# Patient Record
Sex: FEMALE | Race: WHITE | Employment: OTHER | URBAN - METROPOLITAN AREA
[De-identification: names, ages, dates, MRNs, and addresses within clinical notes are randomized per-mention and may not be internally consistent; named-entity substitution may affect disease eponyms.]

---

## 2017-01-16 DIAGNOSIS — I10 ESSENTIAL HYPERTENSION, BENIGN: Primary | ICD-10-CM

## 2017-01-16 DIAGNOSIS — F51.01 PRIMARY INSOMNIA: ICD-10-CM

## 2017-01-16 NOTE — TELEPHONE ENCOUNTER
losartan (COZAAR) 50 MG tablet      Last Written Prescription Date: 12/16/15  Last Fill Quantity: 90, # refills: 3  Last Office Visit with [facility], [facility] or  Health prescribing provider: 4/13/16       POTASSIUM   Date Value Ref Range Status   04/13/2016 4.5 3.4 - 5.3 mmol/L Final     CREATININE   Date Value Ref Range Status   04/13/2016 1.05* 0.52 - 1.04 mg/dL Final     BP Readings from Last 3 Encounters:   07/11/16 108/40   04/13/16 109/61   12/17/15 114/56     traZODone (DESYREL) 50 MG tablet       Last Written Prescription Date: 12/16/15  Last Fill Quantity: 90; # refills: 3  Last Office Visit with [facility], [facility] or [facility] prescribing provider:  4/13/16        Last PHQ-9 score on record=   PHQ-9 SCORE 12/16/2015   Total Score -   Total Score 0       AST       13   12/15/2015  ALT       28   12/15/2015

## 2017-01-18 RX ORDER — LOSARTAN POTASSIUM 50 MG/1
50 TABLET ORAL DAILY
Qty: 90 TABLET | Refills: 0 | Status: SHIPPED | OUTPATIENT
Start: 2017-01-18 | End: 2017-04-19

## 2017-01-18 RX ORDER — TRAZODONE HYDROCHLORIDE 50 MG/1
50 TABLET, FILM COATED ORAL
Qty: 90 TABLET | Refills: 0 | Status: SHIPPED | OUTPATIENT
Start: 2017-01-18 | End: 2017-04-19

## 2017-01-18 NOTE — TELEPHONE ENCOUNTER
Routing refill request to provider for review/approval because:  Labs out of range:  Cr    Med pended.     Rain Ford RN

## 2017-03-08 DIAGNOSIS — I10 HTN (HYPERTENSION), BENIGN: ICD-10-CM

## 2017-03-10 RX ORDER — LABETALOL 200 MG/1
TABLET, FILM COATED ORAL
Qty: 90 TABLET | Refills: 0 | Status: SHIPPED | OUTPATIENT
Start: 2017-03-10 | End: 2017-04-16

## 2017-03-15 DIAGNOSIS — I10 HTN (HYPERTENSION), BENIGN: ICD-10-CM

## 2017-03-16 RX ORDER — LABETALOL 200 MG/1
TABLET, FILM COATED ORAL
Start: 2017-03-16

## 2017-04-16 DIAGNOSIS — I10 HTN (HYPERTENSION), BENIGN: ICD-10-CM

## 2017-04-18 RX ORDER — LABETALOL 200 MG/1
TABLET, FILM COATED ORAL
Qty: 90 TABLET | Refills: 0 | Status: SHIPPED | OUTPATIENT
Start: 2017-04-18 | End: 2017-04-19

## 2017-04-18 NOTE — TELEPHONE ENCOUNTER
labetalol (NORMODYNE) 200 MG tablet      Last Written Prescription Date: 3/10/2017  Last Fill Quantity: 90, # refills: 0    Last Office Visit with [facility], [facility] or [facility] prescribing provider:  4/13/2016   Future Office Visit:    Next 5 appointments (look out 90 days)     Apr 19, 2017 12:00 PM CDT   [facility] Susan Pena [facility]-C   [facility]    [address] [facility] [address]   [phone]                    BP Readings from Last 3 Encounters:   07/11/16 108/40   04/13/16 109/61   12/17/15 114/56

## 2017-04-18 NOTE — TELEPHONE ENCOUNTER
Medication is being filled for 1 time refill only due to:  overdue for OV, scheduled.   Melissa Romero RN

## 2017-04-19 ENCOUNTER — HOSPITAL ENCOUNTER (OUTPATIENT)
Dept: MAMMOGRAPHY | Facility: CLINIC | Age: 77
Discharge: HOME | End: 2017-04-19
Attending: PHYSICIAN ASSISTANT | Admitting: PHYSICIAN ASSISTANT
Payer: MEDICARE

## 2017-04-19 ENCOUNTER — HOSPITAL ENCOUNTER (OUTPATIENT)
Dept: BONE DENSITY | Facility: CLINIC | Age: 77
End: 2017-04-19
Attending: PHYSICIAN ASSISTANT
Payer: MEDICARE

## 2017-04-19 ENCOUNTER — OFFICE VISIT (OUTPATIENT)
Dept: FAMILY MEDICINE | Facility: CLINIC | Age: 77
End: 2017-04-19
Payer: COMMERCIAL

## 2017-04-19 VITALS
SYSTOLIC BLOOD PRESSURE: 114 MMHG | DIASTOLIC BLOOD PRESSURE: 53 MMHG | OXYGEN SATURATION: 94 % | BODY MASS INDEX: 24.35 KG/M2 | HEART RATE: 58 BPM | HEIGHT: 61 IN | TEMPERATURE: 97.5 F | WEIGHT: 129 LBS

## 2017-04-19 DIAGNOSIS — E78.5 HYPERLIPIDEMIA LDL GOAL <100: ICD-10-CM

## 2017-04-19 DIAGNOSIS — I10 ESSENTIAL HYPERTENSION, BENIGN: ICD-10-CM

## 2017-04-19 DIAGNOSIS — F51.01 PRIMARY INSOMNIA: ICD-10-CM

## 2017-04-19 DIAGNOSIS — Z78.0 POSTMENOPAUSAL STATUS: ICD-10-CM

## 2017-04-19 DIAGNOSIS — Z12.31 VISIT FOR SCREENING MAMMOGRAM: ICD-10-CM

## 2017-04-19 DIAGNOSIS — D17.30 LIPOMA OF SKIN AND SUBCUTANEOUS TISSUE: ICD-10-CM

## 2017-04-19 DIAGNOSIS — Z00.00 ENCOUNTER FOR ROUTINE ADULT HEALTH EXAMINATION WITHOUT ABNORMAL FINDINGS: Primary | ICD-10-CM

## 2017-04-19 PROCEDURE — G0202 SCR MAMMO BI INCL CAD: HCPCS

## 2017-04-19 PROCEDURE — 77080 DXA BONE DENSITY AXIAL: CPT

## 2017-04-19 PROCEDURE — G0439 PPPS, SUBSEQ VISIT: HCPCS | Performed by: PHYSICIAN ASSISTANT

## 2017-04-19 RX ORDER — TRAZODONE HYDROCHLORIDE 50 MG/1
50 TABLET, FILM COATED ORAL
Qty: 90 TABLET | Refills: 3 | Status: SHIPPED | OUTPATIENT
Start: 2017-04-19 | End: 2018-05-09

## 2017-04-19 RX ORDER — ROSUVASTATIN CALCIUM 5 MG/1
5 TABLET, COATED ORAL DAILY
Qty: 30 TABLET | Refills: 1 | Status: SHIPPED | OUTPATIENT
Start: 2017-04-19 | End: 2017-08-24

## 2017-04-19 RX ORDER — LOSARTAN POTASSIUM 50 MG/1
50 TABLET ORAL DAILY
Qty: 90 TABLET | Refills: 3 | Status: SHIPPED | OUTPATIENT
Start: 2017-04-19 | End: 2018-05-04

## 2017-04-19 RX ORDER — LABETALOL 200 MG/1
TABLET, FILM COATED ORAL
Qty: 90 TABLET | Refills: 3 | Status: SHIPPED | OUTPATIENT
Start: 2017-04-19 | End: 2018-04-30

## 2017-04-19 RX ORDER — AMLODIPINE BESYLATE 5 MG/1
5 TABLET ORAL DAILY
Qty: 90 TABLET | Refills: 3 | Status: SHIPPED | OUTPATIENT
Start: 2017-04-19 | End: 2018-04-30

## 2017-04-19 RX ORDER — ZOLPIDEM TARTRATE 5 MG/1
2.5 TABLET ORAL
Qty: 30 TABLET | Refills: 5 | Status: SHIPPED | OUTPATIENT
Start: 2017-04-19 | End: 2017-07-27

## 2017-04-19 NOTE — NURSING NOTE
"Chief Complaint   Patient presents with     Wellness Visit     not fasting       Initial /53 (BP Location: Left arm, Cuff Size: Adult Regular)  Pulse 58  Temp 97.5  F (36.4  C) (Oral)  Ht 5' 0.98\" (1.549 m)  Wt 129 lb (58.5 kg)  SpO2 94%  BMI 24.39 kg/m2 Estimated body mass index is 24.39 kg/(m^2) as calculated from the following:    Height as of this encounter: 5' 0.98\" (1.549 m).    Weight as of this encounter: 129 lb (58.5 kg).  Medication Reconciliation: complete.    Nika Morales CMA      "

## 2017-04-19 NOTE — PROGRESS NOTES
SUBJECTIVE:                                                            Karina Lopez is a 76 year old female who presents for Preventive Visit.  She is due to see Dr. Jean-Baptiste in July  The lipoma on her neck seems larger to her and more friends are commenting on this.  She did see gen surg for this many years ago but wants another opinion now since this getting bigger.  She has gained a few pounds so doesn't like her bigger stomach.  Are you in the first 12 months of your Medicare Part B coverage?  No    Healthy Habits:    Do you get at least three servings of calcium containing foods daily (dairy, green leafy vegetables, etc.)? yes    Amount of exercise or daily activities, outside of work: 3 day(s) per week    Problems taking medications regularly No    Medication side effects: Yes Simvastatin    Have you had an eye exam in the past two years? yes    Do you see a dentist twice per year? yes    Do you have sleep apnea, excessive snoring or daytime drowsiness?no    COGNITIVE SCREEN  1) Repeat 3 items (Banana, Sunrise, Chair)    2) Clock draw: NORMAL  3) 3 item recall: Recalls 3 objects  Results: 3 items recalled: COGNITIVE IMPAIRMENT LESS LIKELY    Mini-CogTM Copyright S Patrice. Licensed by the author for use in [facility]; reprinted with permission (sotalya@[facility]). All rights reserved.          Reviewed and updated as needed this visit by clinical staff  Tobacco  Allergies  Meds         Reviewed and updated as needed this visit by Provider  Allergies        Social History   Substance Use Topics     Smoking status: Former Smoker     Start date: 1/1/1974     Smokeless tobacco: Never Used      Comment: light smoker until 1974     Alcohol use 1.8 oz/week     3 Glasses of wine per week      Comment: 3 to 5 drinks per week       The patient does not drink >3 drinks per day nor >7 drinks per week.    Today's PHQ-2 Score:   PHQ-2 ( 1999 [employer]) 4/19/2017 12/16/2015   Q1: Little interest or pleasure in  doing things 0 0   Q2: Feeling down, depressed or hopeless 0 0   PHQ-2 Score 0 0       Do you feel safe in your environment - Yes    Do you have a Health Care Directive?: Yes: Patient states has Advance Directive and will bring in a copy to clinic.    Current providers sharing in care for this patient include:   Patient Care Team:  Susan Pena PA-C as PCP - General (Internal Medicine)  Ricky Puri MD as MD (Cardiology)      Hearing impairment: No    Ability to successfully perform activities of daily living: Yes, no assistance needed     Fall risk:  Fallen 2 or more times in the past year?: No  Any fall with injury in the past year?: No    Home safety:  lack of grab bars in the bathroom      The following health maintenance items are reviewed in Epic and correct as of today:  Health Maintenance   Topic Date Due     PHQ-9 Q6 MONTHS (NO INBASKET)  06/16/2016     HEMOGLOBIN Q1 YR (NO INBASKET)  12/15/2016     MICROALBUMIN Q1 YEAR( NO INBASKET)  12/15/2016     FALL RISK ASSESSMENT  12/16/2016     DEPRESSION ACTION PLAN Q1 YR (NO INBASKET)  12/16/2016     BMP Q1 YR (NO INBASKET)  04/13/2017     LIPID MONITORING Q1 YEAR( NO INBASKET)  04/13/2017     ADVANCE DIRECTIVE PLANNING Q5 YRS (NO INBASKET)  08/14/2017     INFLUENZA VACCINE (SYSTEM ASSIGNED)  09/01/2017     MAMMO SCREEN Q2 YR (SYSTEM ASSIGNED)  04/13/2018     TETANUS IMMUNIZATION (SYSTEM ASSIGNED)  10/20/2018     COLON CANCER SCREEN (SYSTEM ASSIGNED)  02/20/2024     DEXA SCAN SCREENING (SYSTEM ASSIGNED)  Completed     PNEUMOCOCCAL  Completed     Past Medical History:   Diagnosis Date     Anemia      Best vitelliform macular dystrophy      HTN (hypertension), benign      Hx of repair of dissecting thoracic aortic aneurysm, Vasile type A     +fibromuscular dysplasia     Hyperlipidaemia      Hyperlipidemia LDL goal < 130      Insomnia      Osteoporosis, postmenopausal      Past Surgical History:   Procedure Laterality Date     CARDIAC SURGERY        HC OPEN TX METATARSAL FRACTURE       S/p thoracic aortic aneurysm repair  10/21/12     VASCULAR SURGERY       Current Outpatient Prescriptions   Medication Sig Dispense Refill     zolpidem (AMBIEN) 5 MG tablet Take 0.5 tablets (2.5 mg) by mouth nightly as needed for sleep 30 tablet 5     losartan (COZAAR) 50 MG tablet Take 1 tablet (50 mg) by mouth daily 90 tablet 3     traZODone (DESYREL) 50 MG tablet Take 1 tablet (50 mg) by mouth nightly as needed for sleep 90 tablet 3     rosuvastatin (CRESTOR) 5 MG tablet Take 1 tablet (5 mg) by mouth daily 30 tablet 1     labetalol (NORMODYNE) 200 MG tablet TAKE 1 TABLET BY MOUTH 3 TIMES DAILY. PLEASE MAKE APPOINTMENT 90 tablet 0     amLODIPine (NORVASC) 5 MG tablet TAKE ONE TABLET BY MOUTH EVERY DAY 90 tablet 1     simvastatin (ZOCOR) 10 MG tablet Take 5 mg by mouth every other day        Acetaminophen (TYLENOL PO) Take 500 mg by mouth every 4 hours as needed       Ascorbic Acid (VITAMIN C PO) Take 500 mg by mouth daily       calcium carbonate (OS-ALEXI 500 MG [place]. [place]) 500 MG tablet Take 500 mg by mouth daily       fish oil-omega-3 fatty acids (FISH OIL) 1000 MG capsule Take 1 capsule by mouth daily.       lactobacillus rhamnosus, GG, (CULTURELLE) 10 B CELL capsule Take 1 capsule by mouth daily.       cholecalciferol (VITAMIN D3) 1000 UNITS capsule Take 1 capsule by mouth daily.       polyethylene glycol (MIRALAX/GLYCOLAX) powder Take 1 capful by mouth as needed.       [DISCONTINUED] losartan (COZAAR) 50 MG tablet Take 1 tablet (50 mg) by mouth daily 90 tablet 0     [DISCONTINUED] traZODone (DESYREL) 50 MG tablet Take 1 tablet (50 mg) by mouth nightly as needed for sleep 90 tablet 0     scopolamine (TRANSDERM) (1.5mg base/3day) patch Place 1 patch onto the skin every 72 hours.  Apply to hairless area behind one ear at least 4 hours before travel.  Remove old patch and change every 3 days. (Patient not taking: Reported on 4/19/2017) 2 patch 1     [DISCONTINUED] labetalol  "(NORMODYNE) 200 MG tablet Take 1 tablet (200 mg) by mouth 3 times daily 135 tablet 3     MAGNESIUM OXIDE PO Take 200 mg by mouth daily Reported on 4/19/2017         ROS:  Constitutional, HEENT, cardiovascular, pulmonary, GI, , musculoskeletal, neuro, skin, endocrine and psych systems are negative, except as otherwise noted.      OBJECTIVE:                                                            /53 (BP Location: Left arm, Cuff Size: Adult Regular)  Pulse 58  Temp 97.5  F (36.4  C) (Oral)  Ht 5' 0.98\" (1.549 m)  Wt 129 lb (58.5 kg)  SpO2 94%  BMI 24.39 kg/m2 Estimated body mass index is 24.39 kg/(m^2) as calculated from the following:    Height as of this encounter: 5' 0.98\" (1.549 m).    Weight as of this encounter: 129 lb (58.5 kg).  EXAM:   GENERAL APPEARANCE: healthy, alert and no distress  EYES: Eyes grossly normal to inspection, PERRL and conjunctivae and sclerae normal  HENT: ear canals and TM's normal, nose and mouth without ulcers or lesions, oropharynx clear and oral mucous membranes moist  NECK: At her sternal notch there is a soft, rubbery mass which pt has had for years, but is increased in side.  no adenopathy, no asymmetry, masses, or scars and thyroid normal to palpation  RESP: lungs clear to auscultation - no rales, rhonchi or wheezes  BREAST: normal without masses, tenderness or nipple discharge and no palpable axillary masses or adenopathy  CV: regular rate and rhythm, normal S1 S2, no S3 or S4, +murmur, click or rub, no peripheral edema and peripheral pulses strong  ABDOMEN: soft, nontender, no hepatosplenomegaly, no masses and bowel sounds normal  MS: no musculoskeletal defects are noted and gait is age appropriate without ataxia  SKIN: no suspicious lesions or rashes  NEURO: Normal strength and tone, sensory exam grossly normal, mentation intact and speech normal  PSYCH: mentation appears normal and affect normal/bright    ASSESSMENT / PLAN:                                       " "                     Assessment and Plan:     (Z00.00) Encounter for routine adult health examination without abnormal findings  (primary encounter diagnosis)  Comment: pt will return fasting for labs. [facility] and possibly dexa. Colonoscopy is up to date.  Plan:  CBC with         platelets            (F51.01) Primary insomnia  Comment: she is not taking either of these regularly for sleep. Ambien primarily for travel.  Plan: zolpidem (AMBIEN) 5 MG tablet, traZODone         (DESYREL) 50 MG tablet            (I10) Essential hypertension, benign  Comment: well controlled, cont same.  Plan: Albumin Random Urine Quantitative, losartan         (COZAAR) 50 MG tablet, Comprehensive metabolic         panel            (E78.5) Hyperlipidemia LDL goal <100  Comment: DC simvastatin. She is only tolerating a very low dose; higher doses cause myalgias.  Myalgias may be less with rcrestor.  Plan: Lipid panel reflex to direct LDL, rosuvastatin         (CRESTOR) 5 MG tablet            (D17.30) Lipoma of skin and subcutaneous tissue  Comment: neck lipoma is larger this year.  Plan: GENERAL SURG ADULT REFERRAL            (Z78.0) Postmenopausal status  Comment:   Plan: DX Hip/Pelvis/Spine                End of Life Planning:  Patient currently has an advanced directive: Yes.  Practitioner is supportive of decision.    COUNSELING:  Reviewed preventive health counseling, as reflected in patient instructions        Estimated body mass index is 24.39 kg/(m^2) as calculated from the following:    Height as of this encounter: 5' 0.98\" (1.549 m).    Weight as of this encounter: 129 lb (58.5 kg).     reports that she has quit smoking. She started smoking about 43 years ago. She has never used smokeless tobacco.      Appropriate preventive services were discussed with this patient, including applicable screening as appropriate for cardiovascular disease, diabetes, osteopenia/osteoporosis, and " glaucoma.  As appropriate for age/gender, discussed screening for colorectal cancer, prostate cancer, breast cancer, and cervical cancer. Checklist reviewing preventive services available has been given to the patient.    Reviewed patients plan of care and provided an AVS. The Basic Care Plan (routine screening as documented in Health Maintenance) for Karina meets the Care Plan requirement. This Care Plan has been established and reviewed with the Patient.    Counseling Resources:  ATP IV Guidelines  Pooled Cohorts Equation Calculator  Breast Cancer Risk Calculator  FRAX Risk Assessment  ICSI Preventive Guidelines  Dietary Guidelines for Americans, 2010  USDA's MyPlate  ASA Prophylaxis  Lung CA Screening    Susan Pena PA-C  [facility]

## 2017-04-19 NOTE — PATIENT INSTRUCTIONS

## 2017-04-19 NOTE — MR AVS SNAPSHOT
After Visit Summary   4/19/2017    Karina Lopez    MRN: [id number]           Patient Information     Date Of Birth          1940        Visit Information        Provider Department      4/19/2017 12:00 PM Susan Pena PA-C [facility]        Today's Diagnoses     Encounter for routine adult health examination without abnormal findings    -  1    Primary insomnia        Essential hypertension, benign        Hyperlipidemia LDL goal <100        Lipoma of skin and subcutaneous tissue        Postmenopausal status        HTN (hypertension), benign          Care Instructions      Preventive Health Recommendations    Female Ages 65 +    Yearly exam:     See your health care provider every year in order to  o Review health changes.   o Discuss preventive care.    o Review your medicines if your doctor has prescribed any.      You no longer need a yearly Pap test unless you've had an abnormal Pap test in the past 10 years. If you have vaginal symptoms, such as bleeding or discharge, be sure to talk with your provider about a Pap test.      Every 1 to 2 years, have a mammogram.  If you are over 69, talk with your health care provider about whether or not you want to continue having screening mammograms.      Every 10 years, have a colonoscopy. Or, have a yearly FIT test (stool test). These exams will check for colon cancer.       Have a cholesterol test every 5 years, or more often if your doctor advises it.       Have a diabetes test (fasting glucose) every three years. If you are at risk for diabetes, you should have this test more often.       At age 65, have a bone density scan (DEXA) to check for osteoporosis (brittle bone disease).    Shots:    Get a flu shot each year.    Get a tetanus shot every 10 years.    Talk to your doctor about your pneumonia vaccines. There are now two you should receive - Pneumovax (PPSV 23) and Prevnar (PCV 13).    Talk to your doctor about the shingles  vaccine.    Talk to your doctor about the hepatitis B vaccine.    Nutrition:     Eat at least 5 servings of fruits and vegetables each day.      Eat whole-grain bread, whole-wheat pasta and brown rice instead of white grains and rice.      Talk to your provider about Calcium and Vitamin D.     Lifestyle    Exercise at least 150 minutes a week (30 minutes a day, 5 days a week). This will help you control your weight and prevent disease.      Limit alcohol to one drink per day.      No smoking.       Wear sunscreen to prevent skin cancer.       See your dentist twice a year for an exam and cleaning.      See your eye doctor every 1 to 2 years to screen for conditions such as glaucoma, macular degeneration and cataracts.    Mammogram [address]  [phone]          Follow-ups after your visit        Additional Services     GENERAL SURG ADULT REFERRAL       Your provider has referred you to: [facility]: [facility] [place] [phone]   [email or web address]    Please be aware that coverage of these services is subject to the terms and limitations of your health insurance plan.  Call member services at your health plan with any benefit or coverage questions.      Please bring the following with you to your appointment:    (1) Any X-Rays, CTs or MRIs which have been performed.  Contact the facility where they were done to arrange for  prior to your scheduled appointment.   (2) List of current medications   (3) This referral request   (4) Any documents/labs given to you for this referral                  Future tests that were ordered for you today     Open Future Orders        Priority Expected Expires Ordered    DX Hip/Pelvis/Spine Routine  4/19/2018 4/19/2017    Lipid panel reflex to direct LDL Routine 4/20/2017 4/19/2018 4/19/2017    Albumin Random Urine Quantitative Routine 4/20/2017 4/19/2018 4/19/2017    Comprehensive metabolic panel Routine 4/20/2017 4/19/2018  "4/19/2017    CBC with platelets Routine 4/20/2017 4/19/2018 4/19/2017            Who to contact     If you have questions or need follow up information about today's clinic visit or your schedule please contact [facility] directly at [phone].  Normal or non-critical lab and imaging results will be communicated to you by MyChart, letter or phone within 4 business days after the clinic has received the results. If you do not hear from us within 7 days, please contact the clinic through MyChart or phone. If you have a critical or abnormal lab result, we will notify you by phone as soon as possible.  Submit refill requests through [employer] or call your pharmacy and they will forward the refill request to us. Please allow 3 business days for your refill to be completed.          Additional Information About Your Visit        Care EveryWhere ID     This is your Care EveryWhere ID. This could be used by other organizations to access your [place] medical records  [id number]        Your Vitals Were     Pulse Temperature Height Pulse Oximetry BMI (Body Mass Index)       58 97.5  F (36.4  C) (Oral) 5' 0.98\" (1.549 m) 94% 24.39 kg/m2        Blood Pressure from Last 3 Encounters:   04/19/17 114/53   07/11/16 108/40   04/13/16 109/61    Weight from Last 3 Encounters:   04/19/17 129 lb (58.5 kg)   07/11/16 125 lb (56.7 kg)   04/13/16 127 lb (57.6 kg)              We Performed the Following     DEPRESSION ACTION PLAN (DAP)     GENERAL SURG ADULT REFERRAL          Today's Medication Changes          These changes are accurate as of: 4/19/17 12:30 PM.  If you have any questions, ask your nurse or doctor.               Start taking these medicines.        Dose/Directions    rosuvastatin 5 MG tablet   Commonly known as:  CRESTOR   Used for:  Hyperlipidemia LDL goal <100, Encounter for routine adult health examination without abnormal findings   Started by:  Susan Pena PA-C        Dose:  5 mg   Take 1 tablet (5 " mg) by mouth daily   Quantity:  30 tablet   Refills:  1         These medicines have changed or have updated prescriptions.        Dose/Directions    amLODIPine 5 MG tablet   Commonly known as:  NORVASC   This may have changed:  See the new instructions.   Used for:  HTN (hypertension), benign   Changed by:  Susan Pena PA-C        Dose:  5 mg   Take 1 tablet (5 mg) by mouth daily   Quantity:  90 tablet   Refills:  3       labetalol 200 MG tablet   Commonly known as:  NORMODYNE   This may have changed:  See the new instructions.   Used for:  HTN (hypertension), benign   Changed by:  Susan Pena PA-C        TAKE 1 TABLET BY MOUTH 3 TIMES DAILY.   Quantity:  90 tablet   Refills:  3         Stop taking these medicines if you haven't already. Please contact your care team if you have questions.     simvastatin 10 MG tablet   Commonly known as:  ZOCOR   Stopped by:  Susan Pena PA-C                Where to get your medicines      These medications were sent to [facility] [address], [place] [address]  [address], [place] [address], [facility] [address]     Phone:  [phone]     amLODIPine 5 MG tablet    labetalol 200 MG tablet    losartan 50 MG tablet    rosuvastatin 5 MG tablet    traZODone 50 MG tablet         Some of these will need a paper prescription and others can be bought over the counter.  Ask your nurse if you have questions.     Bring a paper prescription for each of these medications     zolpidem 5 MG tablet                Primary Care Provider Office Phone # Fax #    Susan Pena PA-C [phone]       [place] [address]  [facility] [address]        Thank you!     Thank you for choosing [facility]  for your care. Our goal is always to provide you with excellent care. Hearing back from our patients is one way we can continue to improve our services. Please take a few minutes to complete the written  survey that you may receive in the mail after your visit with us. Thank you!             Your Updated Medication List - Protect others around you: Learn how to safely use, store and throw away your medicines at www.disposemymeds.org.          This list is accurate as of: 4/19/17 12:30 PM.  Always use your most recent med list.                   Brand Name Dispense Instructions for use    amLODIPine 5 MG tablet    NORVASC    90 tablet    Take 1 tablet (5 mg) by mouth daily       calcium carbonate 500 MG tablet    OS-ALEXI 500 mg [place]. [place]     Take 500 mg by mouth daily       cholecalciferol 1000 UNITS capsule    vitamin  -D     Take 1 capsule by mouth daily.       fish oil-omega-3 fatty acids 1000 MG capsule      Take 1 capsule by mouth daily.       labetalol 200 MG tablet    NORMODYNE    90 tablet    TAKE 1 TABLET BY MOUTH 3 TIMES DAILY.       lactobacillus rhamnosus (GG) 10 B CELL capsule    CULTURELLE     Take 1 capsule by mouth daily.       losartan 50 MG tablet    COZAAR    90 tablet    Take 1 tablet (50 mg) by mouth daily       MAGNESIUM OXIDE PO      Take 200 mg by mouth daily Reported on 4/19/2017       polyethylene glycol powder    MIRALAX/GLYCOLAX     Take 1 capful by mouth as needed.       rosuvastatin 5 MG tablet    CRESTOR    30 tablet    Take 1 tablet (5 mg) by mouth daily       scopolamine 72 hr patch    TRANSDERM    2 patch    Place 1 patch onto the skin every 72 hours.  Apply to hairless area behind one ear at least 4 hours before travel.  Remove old patch and change every 3 days.       traZODone 50 MG tablet    DESYREL    90 tablet    Take 1 tablet (50 mg) by mouth nightly as needed for sleep       TYLENOL PO      Take 500 mg by mouth every 4 hours as needed       VITAMIN C PO      Take 500 mg by mouth daily       zolpidem 5 MG tablet    AMBIEN    30 tablet    Take 0.5 tablets (2.5 mg) by mouth nightly as needed for sleep

## 2017-04-20 DIAGNOSIS — F51.01 PRIMARY INSOMNIA: ICD-10-CM

## 2017-04-20 RX ORDER — ZOLPIDEM TARTRATE 5 MG/1
2.5 TABLET ORAL
Qty: 30 TABLET | Refills: 5 | Status: CANCELLED | OUTPATIENT
Start: 2017-04-20

## 2017-04-25 DIAGNOSIS — M81.0 OSTEOPOROSIS: Primary | ICD-10-CM

## 2017-04-25 RX ORDER — RALOXIFENE HYDROCHLORIDE 60 MG/1
60 TABLET, FILM COATED ORAL DAILY
Qty: 30 TABLET | Refills: 11 | Status: SHIPPED | OUTPATIENT
Start: 2017-04-25 | End: 2018-05-24

## 2017-05-10 DIAGNOSIS — Z00.00 ENCOUNTER FOR ROUTINE ADULT HEALTH EXAMINATION WITHOUT ABNORMAL FINDINGS: ICD-10-CM

## 2017-05-10 DIAGNOSIS — E78.5 HYPERLIPIDEMIA LDL GOAL <100: ICD-10-CM

## 2017-05-10 DIAGNOSIS — I10 ESSENTIAL HYPERTENSION, BENIGN: ICD-10-CM

## 2017-05-10 LAB
ALBUMIN SERPL-MCNC: 4 G/DL (ref 3.4–5)
ALP SERPL-CCNC: 66 U/L (ref 40–150)
ALT SERPL W P-5'-P-CCNC: 28 U/L (ref 0–50)
ANION GAP SERPL CALCULATED.3IONS-SCNC: 10 MMOL/L (ref 3–14)
AST SERPL W P-5'-P-CCNC: 15 U/L (ref 0–45)
BILIRUB SERPL-MCNC: 1.7 MG/DL (ref 0.2–1.3)
BUN SERPL-MCNC: 27 MG/DL (ref 7–30)
CALCIUM SERPL-MCNC: 9.3 MG/DL (ref 8.5–10.1)
CHLORIDE SERPL-SCNC: 104 MMOL/L (ref 94–109)
CHOLEST SERPL-MCNC: 198 MG/DL
CO2 SERPL-SCNC: 25 MMOL/L (ref 20–32)
CREAT SERPL-MCNC: 1.1 MG/DL (ref 0.52–1.04)
CREAT UR-MCNC: 204 MG/DL
ERYTHROCYTE [DISTWIDTH] IN BLOOD BY AUTOMATED COUNT: 13.7 % (ref 10–15)
GFR SERPL CREATININE-BSD FRML MDRD: 49 ML/MIN/1.7M2
GLUCOSE SERPL-MCNC: 97 MG/DL (ref 70–99)
HCT VFR BLD AUTO: 39.1 % (ref 35–47)
HDLC SERPL-MCNC: 89 MG/DL
HGB BLD-MCNC: 12.8 G/DL (ref 11.7–15.7)
LDLC SERPL CALC-MCNC: 95 MG/DL
MCH RBC QN AUTO: 30.7 PG (ref 26.5–33)
MCHC RBC AUTO-ENTMCNC: 32.7 G/DL (ref 31.5–36.5)
MCV RBC AUTO: 94 FL (ref 78–100)
MICROALBUMIN UR-MCNC: 7 MG/L
MICROALBUMIN/CREAT UR: 3.63 MG/G CR (ref 0–25)
NONHDLC SERPL-MCNC: 109 MG/DL
PLATELET # BLD AUTO: 207 10E9/L (ref 150–450)
POTASSIUM SERPL-SCNC: 4.5 MMOL/L (ref 3.4–5.3)
PROT SERPL-MCNC: 7.4 G/DL (ref 6.8–8.8)
RBC # BLD AUTO: 4.17 10E12/L (ref 3.8–5.2)
SODIUM SERPL-SCNC: 139 MMOL/L (ref 133–144)
TRIGL SERPL-MCNC: 69 MG/DL
WBC # BLD AUTO: 5 10E9/L (ref 4–11)

## 2017-05-10 PROCEDURE — 82043 UR ALBUMIN QUANTITATIVE: CPT | Performed by: PHYSICIAN ASSISTANT

## 2017-05-10 PROCEDURE — 85027 COMPLETE CBC AUTOMATED: CPT | Performed by: PHYSICIAN ASSISTANT

## 2017-05-10 PROCEDURE — 80061 LIPID PANEL: CPT | Performed by: PHYSICIAN ASSISTANT

## 2017-05-10 PROCEDURE — 80053 COMPREHEN METABOLIC PANEL: CPT | Performed by: PHYSICIAN ASSISTANT

## 2017-05-10 PROCEDURE — 36415 COLL VENOUS BLD VENIPUNCTURE: CPT | Performed by: PHYSICIAN ASSISTANT

## 2017-05-10 NOTE — LETTER
[address]  [place] [address]  [phone]                                                                                               Date: 5/11/2017    Karina Lopez                                                                               [address] [facility]    [place] [address]              Dear Karina,    I am happy to report that your CBC or complete blood count is normal with no signs of anemia, leukemia or platelet abnormalities. Your chemistry panel shows no signs of diabetes.  Your blood salts, liver tests, and proteins are all fine.  Your kidney test (creatinine) is stable.    Your total cholesterol is 198 with the normal range being below 200.  Your HDL or good cholesterol is 89 with the normal range being above 50.  Your LDL or bad cholesterol is 95 with the normal range being below 100.  This looks good.    Your urine microalbumin was normal  Enclosed is a copy of your results.      It was a pleasure to see you at your last appointment. If you have any questions, please feel free to call myself or my nurse at [phone].          Sincerely,    Susan Pena PA-C/ Eve RABAGO CMA

## 2017-05-11 NOTE — PROGRESS NOTES
It was a pleasure seeing you for your physical examination.  I wanted to get back to you with your test results.  I have enclosed a copy for your review.      I am happy to report that your CBC or complete blood count is normal with no signs of anemia, leukemia or platelet abnormalities. Your chemistry panel shows no signs of diabetes.  Your blood salts, liver tests, and proteins are all fine.  Your kidney test (creatinine) is stable.    Your total cholesterol is 198 with the normal range being below 200.  Your HDL or good cholesterol is 89 with the normal range being above 50.  Your LDL or bad cholesterol is 95 with the normal range being below 100.  This looks good.    Your urine microalbumin was normal.    Please let me know if you have any questions.    Susan Pena PA-C

## 2017-05-13 DIAGNOSIS — I10 HTN (HYPERTENSION), BENIGN: ICD-10-CM

## 2017-05-15 RX ORDER — LABETALOL 200 MG/1
200 TABLET, FILM COATED ORAL 3 TIMES DAILY
Qty: 90 TABLET | Refills: 3 | Status: SHIPPED | OUTPATIENT
Start: 2017-05-15 | End: 2017-08-24

## 2017-05-15 NOTE — TELEPHONE ENCOUNTER
labetalol (NORMODYNE) 200 MG tablet        Last Written Prescription Date: 4/19/2017  Last Fill Quantity: 90, # refills: 3  Last Office Visit with G, P or [facility] prescribing provider: 4/19/2017       Potassium   Date Value Ref Range Status   05/10/2017 4.5 3.4 - 5.3 mmol/L Final     Creatinine   Date Value Ref Range Status   05/10/2017 1.10 (H) 0.52 - 1.04 mg/dL Final     BP Readings from Last 3 Encounters:   04/19/17 114/53   07/11/16 108/40   04/13/16 109/61

## 2017-05-15 NOTE — TELEPHONE ENCOUNTER
RX sent to different pharmacy for profile.   Prescription approved today per [facility] Refill Protocol.    Karina Quintero RN, BSN

## 2017-06-29 ENCOUNTER — TELEPHONE (OUTPATIENT)
Dept: FAMILY MEDICINE | Facility: CLINIC | Age: 77
End: 2017-06-29

## 2017-06-29 NOTE — TELEPHONE ENCOUNTER
I called and left detailed VM for patient that Susan Pena does recommend office visit with her to go over cholesterol and discuss other options.    I asked that patient call back and schedule future appointment with Susan Pena.     Najma Murillo RN

## 2017-06-29 NOTE — TELEPHONE ENCOUNTER
Made adherence call to patient regarding overdue refill for Crestor 5 mg. I would like to inform that the patient has self-adjusted dose down to 1/2 tab (2.5 mg) every other day due to medication causing back aches. Pt also states medication is expensive (70$/30 days) and does not want to be paying so much for a medicine that has such bad side effects, and this is the second statin she has tried due to ADRs.     Pt also states she has been working hard on diet and exercise, and last lipid panels (5/10/17) were within goal. Unsure about statin dose pt was taking prior to panel.   Pt also states she is scheduled for an appointment with arthritis provider, and lipids will be drawn again in August.     Please reassess pt's statin therapy. I would recommend possibly discontinuing therapy and redrawing panels to verify if therapy is necessary and that ADRs are caused by statin. If therapy is adjusted, please send new rx.

## 2017-07-05 ENCOUNTER — TELEPHONE (OUTPATIENT)
Dept: FAMILY MEDICINE | Facility: CLINIC | Age: 77
End: 2017-07-05

## 2017-07-27 DIAGNOSIS — F51.01 PRIMARY INSOMNIA: ICD-10-CM

## 2017-07-27 RX ORDER — ZOLPIDEM TARTRATE 5 MG/1
2.5 TABLET ORAL
Qty: 30 TABLET | Refills: 5 | Status: SHIPPED | OUTPATIENT
Start: 2017-07-27 | End: 2019-02-22

## 2017-07-27 NOTE — TELEPHONE ENCOUNTER
Ambien      Last Written Prescription Date: 4/19/17  Last Fill Quantity: 30,  # refills: 5   Last Office Visit with [facility], [facility] or [facility] prescribing provider: 4/18/17                                         Next 5 appointments (look out 90 days)     Aug 09, 2017  1:45 PM CDT   Return Visit with Kenn Jean-Baptiste MD   [facility]YSIC[facility] AT [place] ([facility])    [address] [place] [address] [phone]

## 2017-08-24 ENCOUNTER — OFFICE VISIT (OUTPATIENT)
Dept: CARDIOLOGY | Facility: CLINIC | Age: 77
End: 2017-08-24
Attending: INTERNAL MEDICINE
Payer: COMMERCIAL

## 2017-08-24 VITALS
DIASTOLIC BLOOD PRESSURE: 52 MMHG | SYSTOLIC BLOOD PRESSURE: 112 MMHG | BODY MASS INDEX: 23.98 KG/M2 | HEART RATE: 51 BPM | WEIGHT: 127 LBS | HEIGHT: 61 IN

## 2017-08-24 DIAGNOSIS — R22.1 LUMP IN NECK: ICD-10-CM

## 2017-08-24 DIAGNOSIS — I35.9 AORTIC VALVE DISORDER: ICD-10-CM

## 2017-08-24 DIAGNOSIS — R53.83 OTHER FATIGUE: ICD-10-CM

## 2017-08-24 DIAGNOSIS — I35.1 NONRHEUMATIC AORTIC VALVE INSUFFICIENCY: ICD-10-CM

## 2017-08-24 DIAGNOSIS — Z98.890 S/P AORTIC DISSECTION REPAIR: Primary | ICD-10-CM

## 2017-08-24 PROCEDURE — 99214 OFFICE O/P EST MOD 30 MIN: CPT | Performed by: INTERNAL MEDICINE

## 2017-08-24 NOTE — MR AVS SNAPSHOT
After Visit Summary   8/24/2017    Karina Lopez    MRN: [id number]           Patient Information     Date Of Birth          1940        Visit Information        Provider Department      8/24/2017 9:15 AM Kenn Jean-Baptiste MD [facility]YS[facility] AT [place]        Today's Diagnoses     Other fatigue    -  1    S/P aortic dissection repair        Nonrheumatic aortic valve insufficiency        Aortic valve disorder        Lump in neck           Follow-ups after your visit        Additional Services     SLEEP EVALUATION & MANAGEMENT REFERRAL - ADULT       Please be aware that coverage of these services is subject to the terms and limitations of your health insurance plan.  Call member services at your health plan with any benefit or coverage questions.      Please bring the following to your appointment:            Follow-Up with Cardiologist                 Your next 10 appointments already scheduled     Aug 31, 2017  8:30 AM CDT   Ech Complete with [id number]   [facility] CV Echocardiography (Cardiovascular Imaging at [facility])    [address] [address] [phone]           1. Please bring or wear a comfortable two-piece outfit. 2. You may eat, drink and take your normal medicines. 3. For any questions that cannot be answered, please contact the ordering physician            Aug 31, 2017  9:30 AM CDT   US THYROID with SHUS5   [facility] Ultrasound ([facility])    [address] [facility] [address] [phone]           Please bring a list of your medicines (including vitamins, minerals and over-the-counter drugs). Also, tell your doctor about any allergies you may have. Wear comfortable clothes and leave your valuables at home.  You do not need to do anything special to prepare for your exam.  Please call the Imaging Department at your exam site with any questions.             "  Future tests that were ordered for you today     Open Future Orders        Priority Expected Expires Ordered    Follow-Up with Cardiologist Routine 2018    US Thyroid Routine 2017    Echocardiogram Routine 2017    SLEEP EVALUATION & MANAGEMENT REFERRAL - ADULT Routine 2017            Who to contact     If you have questions or need follow up information about today's clinic visit or your schedule please contact [facility] PHYSICIANS HEART AT [place] directly at [phone].  Normal or non-critical lab and imaging results will be communicated to you by MyChart, letter or phone within 4 business days after the clinic has received the results. If you do not hear from us within 7 days, please contact the clinic through [employer] or phone. If you have a critical or abnormal lab result, we will notify you by phone as soon as possible.  Submit refill requests through [employer] or call your pharmacy and they will forward the refill request to us. Please allow 3 business days for your refill to be completed.          Additional Information About Your Visit        [employer] Information     [employer] lets you send messages to your doctor, view your test results, renew your prescriptions, schedule appointments and more. To sign up, go to [email or web address] . Click on \"Log in\" on the left side of the screen, which will take you to the Welcome page. Then click on \"Sign up Now\" on the right side of the page.     You will be asked to enter the access code listed below, as well as some personal information. Please follow the directions to create your username and password.     Your access code is: [email or web address]  Expires: 2017 10:02 AM     Your access code will  in 90 days. If you need help or a new code, please call your [place] clinic or [phone].        Care EveryWhere ID     This is your Care EveryWhere " "ID. This could be used by other organizations to access your [place] medical records  [id number]        Your Vitals Were     Pulse Height BMI (Body Mass Index)             51 1.549 m (5' 0.98\") 24.01 kg/m2          Blood Pressure from Last 3 Encounters:   08/24/17 112/52   04/19/17 114/53   07/11/16 108/40    Weight from Last 3 Encounters:   08/24/17 57.6 kg (127 lb)   04/19/17 58.5 kg (129 lb)   07/11/16 56.7 kg (125 lb)              We Performed the Following     Follow-Up with Cardiologist          Today's Medication Changes          These changes are accurate as of: 8/24/17 10:02 AM.  If you have any questions, ask your nurse or doctor.               Stop taking these medicines if you haven't already. Please contact your care team if you have questions.     rosuvastatin 5 MG tablet   Commonly known as:  CRESTOR   Stopped by:  Kenn Jean-Baptiste MD                    Primary Care Provider Office Phone # Fax #    Susan Freya Pena PA-C [phone] [address] [facility] [address]  [facility] [address]        Equal Access to Services     STAN [facility] RankinFELIPE AH: Hadii dl Lin, waaxda lukhadijah, qaybta kaalmada adesabinoda, valeria french. So [facility] [phone].    ATENCIÓN: Si habla español, tiene a yip disposición servicios gratuitos de asistencia lingüística. [place] al [phone].    We comply with applicable federal civil rights laws and [place] laws. We do not discriminate on the basis of race, color, national origin, age, disability sex, sexual orientation or gender identity.            Thank you!     Thank you for choosing [facility] PHYSIC[facility]ANS HEART AT [place]  for your care. Our goal is always to provide you with excellent care. Hearing back from our patients is one way we can continue to improve our services. Please take a few minutes to complete the written survey that you may receive in the mail after your visit with us. Thank you!             Your " Updated Medication List - Protect others around you: Learn how to safely use, store and throw away your medicines at www.disposemymeds.org.          This list is accurate as of: 8/24/17 10:02 AM.  Always use your most recent med list.                   Brand Name Dispense Instructions for use Diagnosis    amLODIPine 5 MG tablet    NORVASC    90 tablet    Take 1 tablet (5 mg) by mouth daily        calcium carbonate 1250 MG tablet    OS-ALEXI 500 mg [place]. Ca     Take 500 mg by mouth daily        cholecalciferol 1000 UNITS capsule    vitamin  -D     Take 1 capsule by mouth daily.        fish oil-omega-3 fatty acids 1000 MG capsule      Take 1 capsule by mouth daily.        labetalol 200 MG tablet    NORMODYNE    90 tablet    TAKE 1 TABLET BY MOUTH 3 TIMES DAILY.        lactobacillus rhamnosus (GG) 10 B CELL capsule    CULTURELLE     Take 1 capsule by mouth daily.        losartan 50 MG tablet    COZAAR    90 tablet    Take 1 tablet (50 mg) by mouth daily    Essential hypertension, benign       MAGNESIUM OXIDE PO      Take 200 mg by mouth daily Reported on 4/19/2017        polyethylene glycol powder    MIRALAX/GLYCOLAX     Take 1 capful by mouth as needed.        raloxifene 60 MG tablet    Evista    30 tablet    Take 1 tablet (60 mg) by mouth daily    Osteoporosis       scopolamine 72 hr patch    TRANSDERM    2 patch    Place 1 patch onto the skin every 72 hours.  Apply to hairless area behind one ear at least 4 hours before travel.  Remove old patch and change every 3 days.    History of motion sickness       traZODone 50 MG tablet    DESYREL    90 tablet    Take 1 tablet (50 mg) by mouth nightly as needed for sleep    Primary insomnia       TYLENOL PO      Take 500 mg by mouth every 4 hours as needed        VITAMIN C PO      Take 500 mg by mouth daily        zolpidem 5 MG tablet    AMBIEN    30 tablet    Take 0.5 tablets (2.5 mg) by mouth nightly as needed for sleep    Primary insomnia

## 2017-08-24 NOTE — LETTER
8/24/2017    Susan Pena, SHEILA  [address]  [facility] [address]    RE: Karina HAMPTON Jessica       Dear Colleague,    I had the pleasure of seeing Karina Lopez in the [facility].    HPI and Plan:   REASON FOR VISIT:  Followup for type A aortic dissection, status post repair.      PRIMARY CARE PHYSICIAN:  Susan Pena.      HISTORY OF PRESENT ILLNESS:  I again had the pleasure of seeing Karina Lopez at the [facility] in [place] this afternoon.  She is a very pleasant 76-year-old female with history of type A aortic dissection status post repair, hyperlipidemia and hypertension.      In 10/2012 the patient developed acute, tearing chest pain while flying to [place], [place].  At the time, she was working as a .  She was rushed to a local hospital, where she was diagnosed with acute type A aortic dissection.  She underwent emergent repair of the ascending aorta and resuspension of the aortic valve.  Postoperatively, she developed acute renal failure which resolved conservatively.  The aortic dissection extended down to the left external iliac artery.  Luckily, she did not have any blood flow compromised to her visceral organs or spinal cord.  All of the blood vessels to her visceral organs came off the true lumen.  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  Subsequent imaging demonstrated atrophic left kidney with mild perfusion abnormality.  She was seen by Nephrology, and they did not recommend an intervention with regard to the left renal artery stenosis.      Over the years, she has had multiple CTAs.  The most recent CTA performed in June of last year showed patent aortic arch vessels as well as interval complete resolution of the false lumen within the thoracic aorta.  The left renal artery appears still pinched by the false lumen.  Overall, the CTA  findings remain stable.      She has done fairly well over the last year.  She has not had any symptoms including back pain or abdominal pain.  She has some shoulder discomfort las year.  She was able to discontinue simvastain.  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      IMPRESSION, REPORT AND PLAN:   1.  History of type A aortic dissection, status post repair with resuspension of the aortic valve.   2.  Moderate aortic valve regurgitation.   3.  Hypertension.   4.  Hyperlipidemia.        Ms. Lopez continues to do well from a cardiovascular standpoint.  Her last CTA showed stable findings.  She currently does not endorse any symptoms.  Her blood pressure is well controlled with the current medical program. Given her CKD and stable CTAs over the past 4 yrs, we will not perform another CT this year. We will repeat another CT next year. We will obtain echocardiogram to follow up on known AI.     She is c/o fatigue and daytime somnolence. We will have undergo sleep evaluation. Finally, she also has a fatty lump in her neck and she is concerned about thyroid involvement. The mass appears benign fatty lump but we will obtain thyroid ultrasound and refer in needed.      We will see her next year for followup.  In the interim, if she develops any symptoms, she will communicate with us.      I appreciate the opportunity to be part of this patient's care.          RAMAKRISHNA DIAZ MD         D: 2016 15:38   T: 2016 22:51   MT: XU      Name:     KELI LOPEZ   MRN:      [id number]        Account:      [id number]   :      1940           Service Date: 2016      Document: [id number]      Orders Placed This Encounter   Procedures     US Thyroid     SLEEP EVALUATION & MANAGEMENT REFERRAL - ADULT     Follow-Up with Cardiologist     Echocardiogram       No orders of the defined types were placed in this encounter.      Medications Discontinued During  This Encounter   Medication Reason     labetalol (NORMODYNE) 200 MG tablet Erroneous Entry     rosuvastatin (CRESTOR) 5 MG tablet          Encounter Diagnoses   Name Primary?     S/P aortic dissection repair Yes     Nonrheumatic aortic valve insufficiency      Other fatigue      Aortic valve disorder      Lump in neck        CURRENT MEDICATIONS:  Current Outpatient Prescriptions   Medication Sig Dispense Refill     zolpidem (AMBIEN) 5 MG tablet Take 0.5 tablets (2.5 mg) by mouth nightly as needed for sleep 30 tablet 5     losartan (COZAAR) 50 MG tablet Take 1 tablet (50 mg) by mouth daily 90 tablet 3     traZODone (DESYREL) 50 MG tablet Take 1 tablet (50 mg) by mouth nightly as needed for sleep 90 tablet 3     labetalol (NORMODYNE) 200 MG tablet TAKE 1 TABLET BY MOUTH 3 TIMES DAILY. 90 tablet 3     amLODIPine (NORVASC) 5 MG tablet Take 1 tablet (5 mg) by mouth daily 90 tablet 3     Acetaminophen (TYLENOL PO) Take 500 mg by mouth every 4 hours as needed       Ascorbic Acid (VITAMIN C PO) Take 500 mg by mouth daily       calcium carbonate (OS-ALEXI 500 MG [place]. CA) 500 MG tablet Take 500 mg by mouth daily       MAGNESIUM OXIDE PO Take 200 mg by mouth daily Reported on 4/19/2017       fish oil-omega-3 fatty acids (FISH OIL) 1000 MG capsule Take 1 capsule by mouth daily.       lactobacillus rhamnosus, GG, (CULTURELLE) 10 B CELL capsule Take 1 capsule by mouth daily.       cholecalciferol (VITAMIN D3) 1000 UNITS capsule Take 1 capsule by mouth daily.       polyethylene glycol (MIRALAX/GLYCOLAX) powder Take 1 capful by mouth as needed.       [DISCONTINUED] labetalol (NORMODYNE) 200 MG tablet Take 1 tablet (200 mg) by mouth 3 times daily 90 tablet 3     raloxifene (EVISTA) 60 MG tablet Take 1 tablet (60 mg) by mouth daily (Patient not taking: Reported on 8/24/2017) 30 tablet 11     scopolamine (TRANSDERM) (1.5mg base/3day) patch Place 1 patch onto the skin every 72 hours.  Apply to hairless area behind one ear at least 4  hours before travel.  Remove old patch and change every 3 days. (Patient not taking: Reported on 8/24/2017) 2 patch 1       ALLERGIES     Allergies   Allergen Reactions     Lisinopril      Cough       Simvastatin      myalgias       PAST MEDICAL HISTORY:  Past Medical History:   Diagnosis Date     Anemia      Best vitelliform macular dystrophy      HTN (hypertension), benign      Hx of repair of dissecting thoracic aortic aneurysm, [place] type A     +fibromuscular dysplasia     Hyperlipidaemia      Hyperlipidemia LDL goal < 130      Insomnia      Osteoporosis, postmenopausal        PAST SURGICAL HISTORY:  Past Surgical History:   Procedure Laterality Date     CARDIAC SURGERY       HC OPEN TX METATARSAL FRACTURE       S/p thoracic aortic aneurysm repair  10/21/12     VASCULAR SURGERY         FAMILY HISTORY:  Family History   Problem Relation Age of Onset     Hypertension Father      best syndrome     CANCER Father      lung     Hypertension Paternal Grandmother      stroke     Alcohol/Drug Brother      best syndr       SOCIAL HISTORY:  Social History     Social History     Marital status: Single     Spouse name: N/A     Number of children: N/A     Years of education: N/A     Social History Main Topics     Smoking status: Former Smoker     Start date: 1/1/1974     Smokeless tobacco: Never Used      Comment: light smoker until 1974     Alcohol use 1.8 oz/week     3 Glasses of wine per week      Comment: 3 to 5 drinks per week     Drug use: No     Sexual activity: Yes     Partners: Male     Other Topics Concern     Parent/Sibling W/ Cabg, Mi Or Angioplasty Before 65f 55m? 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       Review of Systems:  Skin:  Negative       Eyes:  Positive for glasses    ENT:  Negative      Respiratory:  Negative       Cardiovascular:    fatigue;Positive  "for;lightheadedness;dizziness    Gastroenterology: Negative      Genitourinary:  Negative      Musculoskeletal:  Negative      Neurologic:  Negative      Psychiatric:  Negative      Heme/Lymph/Imm:  Negative      Endocrine:  Negative        Physical Exam:  Vitals: /52  Pulse 51  Ht 1.549 m (5' 0.98\")  Wt 57.6 kg (127 lb)  BMI 24.01 kg/m2    Constitutional:  in no acute distress        Skin:  warm and dry to the touch, no apparent skin lesions or masses noted        Head:  normocephalic;no masses or lesions        Eyes:           ENT:  dentition good;no pallor or cyanosis        Neck:  JVP normal;no carotid bruit;carotid pulses are full and equal bilaterally        Chest:  clear to auscultation          Cardiac:               Normal S1, S2, 1/6 ELISEO, faint holodiastolic murmur at the LUSB, no rubs or gallops    Abdomen:      soft, non-tender, + bruit    Vascular:                                     2+ bilateral femoral, dp, radial and carotid    Extremities and Back:  no edema;no deformities, clubbing, cyanosis, erythema observed              Neurological:  no gross motor deficits        Thank you for allowing me to participate in the care of your patient.    Sincerely,     Kenn Jean-Baptiste MD    [facility]      "

## 2017-08-24 NOTE — PROGRESS NOTES
HPI and Plan:   REASON FOR VISIT:  Followup for type A aortic dissection, status post repair.      PRIMARY CARE PHYSICIAN:  Susan Pena.      HISTORY OF PRESENT ILLNESS:  I again had the pleasure of seeing Karina Lopez at the [facility] in [place] this afternoon.  She is a very pleasant 76-year-old female with history of type A aortic dissection status post repair, hyperlipidemia and hypertension.      In 10/2012 the patient developed acute, tearing chest pain while flying to [place], [place].  At the time, she was working as a .  She was rushed to a local hospital, where she was diagnosed with acute type A aortic dissection.  She underwent emergent repair of the ascending aorta and resuspension of the aortic valve.  Postoperatively, she developed acute renal failure which resolved conservatively.  The aortic dissection extended down to the left external iliac artery.  Luckily, she did not have any blood flow compromised to her visceral organs or spinal cord.  All of the blood vessels to her visceral organs came off the true lumen.  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  Subsequent imaging demonstrated atrophic left kidney with mild perfusion abnormality.  She was seen by Nephrology, and they did not recommend an intervention with regard to the left renal artery stenosis.      Over the years, she has had multiple CTAs.  The most recent CTA performed in June of last year showed patent aortic arch vessels as well as interval complete resolution of the false lumen within the thoracic aorta.  The left renal artery appears still pinched by the false lumen.  Overall, the CTA findings remain stable.      She has done fairly well over the last year.  She has not had any symptoms including back pain or abdominal pain.  She has some shoulder discomfort las year.  She was able to discontinue simvastain.   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      IMPRESSION, REPORT AND PLAN:   1.  History of type A aortic dissection, status post repair with resuspension of the aortic valve.   2.  Moderate aortic valve regurgitation.   3.  Hypertension.   4.  Hyperlipidemia.        Ms. Loepz continues to do well from a cardiovascular standpoint.  Her last CTA showed stable findings.  She currently does not endorse any symptoms.  Her blood pressure is well controlled with the current medical program. Given her CKD and stable CTAs over the past 4 yrs, we will not perform another CT this year. We will repeat another CT next year. We will obtain echocardiogram to follow up on known AI.     She is c/o fatigue and daytime somnolence. We will have undergo sleep evaluation. Finally, she also has a fatty lump in her neck and she is concerned about thyroid involvement. The mass appears benign fatty lump but we will obtain thyroid ultrasound and refer in needed.      We will see her next year for followup.  In the interim, if she develops any symptoms, she will communicate with us.      I appreciate the opportunity to be part of this patient's care.          RAMAKRISHNA DIAZ MD         D: 2016 15:38   T: 2016 22:51   MT: XU      Name:     KELI LOPEZ   MRN:      [id number]        Account:      [id number]   :      1940           Service Date: 2016      Document: [id number]      Orders Placed This Encounter   Procedures     US Thyroid     SLEEP EVALUATION & MANAGEMENT REFERRAL - ADULT     Follow-Up with Cardiologist     Echocardiogram       No orders of the defined types were placed in this encounter.      Medications Discontinued During This Encounter   Medication Reason     labetalol (NORMODYNE) 200 MG tablet Erroneous Entry     rosuvastatin (CRESTOR) 5 MG tablet          Encounter Diagnoses   Name Primary?     S/P aortic dissection repair Yes     Nonrheumatic  aortic valve insufficiency      Other fatigue      Aortic valve disorder      Lump in neck        CURRENT MEDICATIONS:  Current Outpatient Prescriptions   Medication Sig Dispense Refill     zolpidem (AMBIEN) 5 MG tablet Take 0.5 tablets (2.5 mg) by mouth nightly as needed for sleep 30 tablet 5     losartan (COZAAR) 50 MG tablet Take 1 tablet (50 mg) by mouth daily 90 tablet 3     traZODone (DESYREL) 50 MG tablet Take 1 tablet (50 mg) by mouth nightly as needed for sleep 90 tablet 3     labetalol (NORMODYNE) 200 MG tablet TAKE 1 TABLET BY MOUTH 3 TIMES DAILY. 90 tablet 3     amLODIPine (NORVASC) 5 MG tablet Take 1 tablet (5 mg) by mouth daily 90 tablet 3     Acetaminophen (TYLENOL PO) Take 500 mg by mouth every 4 hours as needed       Ascorbic Acid (VITAMIN C PO) Take 500 mg by mouth daily       calcium carbonate (OS-ALEXI 500 MG [place]. CA) 500 MG tablet Take 500 mg by mouth daily       MAGNESIUM OXIDE PO Take 200 mg by mouth daily Reported on 4/19/2017       fish oil-omega-3 fatty acids (FISH OIL) 1000 MG capsule Take 1 capsule by mouth daily.       lactobacillus rhamnosus, GG, (CULTURELLE) 10 B CELL capsule Take 1 capsule by mouth daily.       cholecalciferol (VITAMIN D3) 1000 UNITS capsule Take 1 capsule by mouth daily.       polyethylene glycol (MIRALAX/GLYCOLAX) powder Take 1 capful by mouth as needed.       [DISCONTINUED] labetalol (NORMODYNE) 200 MG tablet Take 1 tablet (200 mg) by mouth 3 times daily 90 tablet 3     raloxifene (EVISTA) 60 MG tablet Take 1 tablet (60 mg) by mouth daily (Patient not taking: Reported on 8/24/2017) 30 tablet 11     scopolamine (TRANSDERM) (1.5mg base/3day) patch Place 1 patch onto the skin every 72 hours.  Apply to hairless area behind one ear at least 4 hours before travel.  Remove old patch and change every 3 days. (Patient not taking: Reported on 8/24/2017) 2 patch 1       ALLERGIES     Allergies   Allergen Reactions     Lisinopril      Cough       Simvastatin      myalgias        PAST MEDICAL HISTORY:  Past Medical History:   Diagnosis Date     Anemia      Best vitelliform macular dystrophy      HTN (hypertension), benign      Hx of repair of dissecting thoracic aortic aneurysm, Vasile type A     +fibromuscular dysplasia     Hyperlipidaemia      Hyperlipidemia LDL goal < 130      Insomnia      Osteoporosis, postmenopausal        PAST SURGICAL HISTORY:  Past Surgical History:   Procedure Laterality Date     CARDIAC SURGERY       HC OPEN TX METATARSAL FRACTURE       S/p thoracic aortic aneurysm repair  10/21/12     VASCULAR SURGERY         FAMILY HISTORY:  Family History   Problem Relation Age of Onset     Hypertension Father      best syndrome     CANCER Father      lung     Hypertension Paternal Grandmother      stroke     Alcohol/Drug Brother      best syndr       SOCIAL HISTORY:  Social History     Social History     Marital status: Single     Spouse name: N/A     Number of children: N/A     Years of education: N/A     Social History Main Topics     Smoking status: Former Smoker     Start date: 1/1/1974     Smokeless tobacco: Never Used      Comment: light smoker until 1974     Alcohol use 1.8 oz/week     3 Glasses of wine per week      Comment: 3 to 5 drinks per week     Drug use: No     Sexual activity: Yes     Partners: Male     Other Topics Concern     Parent/Sibling W/ Cabg, Mi Or Angioplasty Before 65f 55m? 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       Review of Systems:  Skin:  Negative       Eyes:  Positive for glasses    ENT:  Negative      Respiratory:  Negative       Cardiovascular:    fatigue;Positive for;lightheadedness;dizziness    Gastroenterology: Negative      Genitourinary:  Negative      Musculoskeletal:  Negative      Neurologic:  Negative      Psychiatric:  Negative      Heme/Lymph/Imm:  Negative      Endocrine:   "Negative        Physical Exam:  Vitals: /52  Pulse 51  Ht 1.549 m (5' 0.98\")  Wt 57.6 kg (127 lb)  BMI 24.01 kg/m2    Constitutional:  in no acute distress        Skin:  warm and dry to the touch, no apparent skin lesions or masses noted        Head:  normocephalic;no masses or lesions        Eyes:           ENT:  dentition good;no pallor or cyanosis        Neck:  JVP normal;no carotid bruit;carotid pulses are full and equal bilaterally        Chest:  clear to auscultation          Cardiac:               Normal S1, S2, 1/6 ELISEO, faint holodiastolic murmur at the LUSB, no rubs or gallops    Abdomen:      soft, non-tender, + bruit    Vascular:                                     2+ bilateral femoral, dp, radial and carotid    Extremities and Back:  no edema;no deformities, clubbing, cyanosis, erythema observed              Neurological:  no gross motor deficits              CC  Kenn Jean-Baptiste MD  [address]  [place] [address]              "

## 2017-08-31 ENCOUNTER — HOSPITAL ENCOUNTER (OUTPATIENT)
Dept: ULTRASOUND IMAGING | Facility: CLINIC | Age: 77
Discharge: HOME | End: 2017-08-31
Attending: INTERNAL MEDICINE | Admitting: INTERNAL MEDICINE
Payer: MEDICARE

## 2017-08-31 ENCOUNTER — HOSPITAL ENCOUNTER (OUTPATIENT)
Dept: CARDIOLOGY | Facility: CLINIC | Age: 77
Discharge: HOME | End: 2017-08-31
Attending: INTERNAL MEDICINE | Admitting: INTERNAL MEDICINE
Payer: MEDICARE

## 2017-08-31 DIAGNOSIS — R22.1 LUMP IN NECK: ICD-10-CM

## 2017-08-31 DIAGNOSIS — Z98.890 S/P AORTIC DISSECTION REPAIR: ICD-10-CM

## 2017-08-31 DIAGNOSIS — I35.9 AORTIC VALVE DISORDER: ICD-10-CM

## 2017-08-31 PROCEDURE — 93306 TTE W/DOPPLER COMPLETE: CPT | Mod: 26 | Performed by: INTERNAL MEDICINE

## 2017-08-31 PROCEDURE — 93306 TTE W/DOPPLER COMPLETE: CPT

## 2017-09-05 ENCOUNTER — TELEPHONE (OUTPATIENT)
Dept: CARDIOLOGY | Facility: CLINIC | Age: 77
End: 2017-09-05

## 2017-09-05 NOTE — TELEPHONE ENCOUNTER
Pt called requesting results from the tests that she had done last week. Pt also lost the sleep apnea referral number that they gave her. Writer gave pt sleep apnea phone number on referral [phone]. Writer reviewed Echo interpretation with patient and informed patient that Dr. Jean-Baptiste will review and make recommendations as needed. Writer also informed patient that she would have Dr. Jean-Baptiste review the thyroid US and we will give her a call with his review. Pt verbalized understanding and thanked writer.     Echo 8/31/17   Interpretation Summary     The left ventricle is normal in size.  There is mild concentric left ventricular hypertrophy.  Left ventricular systolic function is normal.  The visual ejection fraction is estimated at 60-65%.  Normal left ventricular wall motion  E by E prime ratio is greater than 15, that likely suggests increased left  ventricular filling pressures.  The aortic valve is trileaflet with aortic valve sclerosis.  There is mild to moderate (1-2+) aortic regurgitation.  No hemodynamically significant valvular aortic stenosis.  There is mild to moderate (1-2+) mitral regurgitation.  Compared to prior study, there is no significant change.      ULTRASOUND THYROID  8/31/2017 9:44 AM      COMPARISON: None.     HISTORY: Localized swelling, mass and lump, neck.     FINDINGS: The right lobe measures 3.9 x 1.5 x 1.7 cm. The left lobe  measures 3.5 x 1.3 x 1.0 cm. The isthmus is minimally thickened at 4  mm. Thyroid parenchyma is homogenous in echotexture.     Thyroid nodules as follows: None.     The palpable lump is seen separate from the thyroid, possibly a lipoma  measuring 1.9 x 2.1 x 1.4 cm.         IMPRESSION: Soft tissue lump separate from the thyroid, possibly a  lipoma measuring 1.9 x 2.1 x 1.4 cm.     TATI BAUTISTA MD    Will route to Dr. Jean-Baptiste to review tests.

## 2017-09-06 NOTE — TELEPHONE ENCOUNTER
Notes Recorded by Kenn Jean-Baptiste MD on 9/5/2017 at 10:14 PM  Thyroid ultrasound and echo reviewed. Thyroid ultrasound showed normal gland. Neck mass is likely lipoma. The echo shows no significant abnormality.    Tried to call patient to review results above. No answer. Left detailed message for patient and team 4 direct number for patient to call back with any questions or concerns.

## 2017-09-07 ENCOUNTER — OFFICE VISIT (OUTPATIENT)
Dept: SLEEP MEDICINE | Facility: CLINIC | Age: 77
End: 2017-09-07
Payer: COMMERCIAL

## 2017-09-07 VITALS
SYSTOLIC BLOOD PRESSURE: 97 MMHG | OXYGEN SATURATION: 97 % | WEIGHT: 129 LBS | HEART RATE: 68 BPM | DIASTOLIC BLOOD PRESSURE: 56 MMHG | HEIGHT: 61 IN | BODY MASS INDEX: 24.35 KG/M2

## 2017-09-07 DIAGNOSIS — F51.04 CHRONIC INSOMNIA: ICD-10-CM

## 2017-09-07 DIAGNOSIS — G25.81 RESTLESS LEGS SYNDROME (RLS): ICD-10-CM

## 2017-09-07 DIAGNOSIS — R53.83 OTHER FATIGUE: ICD-10-CM

## 2017-09-07 DIAGNOSIS — G47.33 OSA (OBSTRUCTIVE SLEEP APNEA): Primary | ICD-10-CM

## 2017-09-07 PROCEDURE — 99215 OFFICE O/P EST HI 40 MIN: CPT | Performed by: PHYSICIAN ASSISTANT

## 2017-09-07 NOTE — NURSING NOTE
"Chief Complaint   Patient presents with     Sleep Problem     Snoring, tired in the morning       Initial BP 97/56  Pulse 68  Ht 1.549 m (5' 0.98\")  Wt 58.5 kg (129 lb)  SpO2 97%  BMI 24.39 kg/m2 Estimated body mass index is 24.39 kg/(m^2) as calculated from the following:    Height as of this encounter: 1.549 m (5' 0.98\").    Weight as of this encounter: 58.5 kg (129 lb).  Medication Reconciliation: complete   Neck 35  ESS 18  Glory Heredia MA      "

## 2017-09-07 NOTE — PATIENT INSTRUCTIONS
You will be provided with an auto-titrating CPAP with a pressure range of 5-15 cm with heated humidity to limit nasal congestion. Adjust the heat level on humidifier to find a setting that prevents dry nose but does not cause condensation in the hose or mask. Use distilled water in the humidifier.  The CPAP has a ramp function that starts the pressure lower than your prescribed pressure and gradually increases it over a number of minutes.  This may make it easier to fall asleep.    Try to use the CPAP every-night, all night (minimum of 4 hours). Many insurances require that we prove you are using the CPAP at least 4 hours on at least 70% of nights over a 30 day period. We have 90 days to meet those criteria.            Discussed weight management and the impact of weight gain on sleep apnea.  Let me know if you snore or feel the pressure is too high.    You can get new supplies (mask, hose and filter) for your CPAP every 3-6 months, covered by insurance. You do not need to get supplies that often, but they are available if you would like them.  You may exchange the mask once within the first month if you feel the initial mask does not fit well.  Contact your medical equipment provider for equipment issues.  Please let me know if you have any return of snoring, daytime sleepiness or poor sleep quality. We will want to make sure your CPAP is adequately treating your apnea.  There is a website called CPAP.com that has accessories that may make CPAP use easier. Please visit it at your convenience.  Our phone number is [phone].    Follow-up 2 month.  Bring your CPAP machine with you to the follow up appointment.

## 2017-09-07 NOTE — MR AVS SNAPSHOT
After Visit Summary   9/7/2017    Karina Lopez    MRN: [id number]           Patient Information     Date Of Birth          1940        Visit Information        Provider Department      9/7/2017 2:00 PM Goltz, Bennett Ezra, PA-C [facility]        Today's Diagnoses     ASH (obstructive sleep apnea)(mild AHI=14)    -  1    Other fatigue        Restless legs syndrome (RLS)        Chronic insomnia          Care Instructions    You will be provided with an auto-titrating CPAP with a pressure range of 5-15 cm with heated humidity to limit nasal congestion. Adjust the heat level on humidifier to find a setting that prevents dry nose but does not cause condensation in the hose or mask. Use distilled water in the humidifier.  The CPAP has a ramp function that starts the pressure lower than your prescribed pressure and gradually increases it over a number of minutes.  This may make it easier to fall asleep.    Try to use the CPAP every-night, all night (minimum of 4 hours). Many insurances require that we prove you are using the CPAP at least 4 hours on at least 70% of nights over a 30 day period. We have 90 days to meet those criteria.            Discussed weight management and the impact of weight gain on sleep apnea.  Let me know if you snore or feel the pressure is too high.    You can get new supplies (mask, hose and filter) for your CPAP every 3-6 months, covered by insurance. You do not need to get supplies that often, but they are available if you would like them.  You may exchange the mask once within the first month if you feel the initial mask does not fit well.  Contact your medical equipment provider for equipment issues.  Please let me know if you have any return of snoring, daytime sleepiness or poor sleep quality. We will want to make sure your CPAP is adequately treating your apnea.  There is a website called CPAP.com that has accessories that may make CPAP use  "easier. Please visit it at your convenience.  Our phone number is [phone].    Follow-up 2 month.  Bring your CPAP machine with you to the follow up appointment.              Follow-ups after your visit        Who to contact     If you have questions or need follow up information about today's clinic visit or your schedule please contact [facility] [place] directly at [phone].  Normal or non-critical lab and imaging results will be communicated to you by MyChart, letter or phone within 4 business days after the clinic has received the results. If you do not hear from us within 7 days, please contact the clinic through [employer] or phone. If you have a critical or abnormal lab result, we will notify you by phone as soon as possible.  Submit refill requests through [employer] or call your pharmacy and they will forward the refill request to us. Please allow 3 business days for your refill to be completed.          Additional Information About Your Visit        [facility] Information     [employer] lets you send messages to your doctor, view your test results, renew your prescriptions, schedule appointments and more. To sign up, go to [email or web address] . Click on \"Log in\" on the left side of the screen, which will take you to the Welcome page. Then click on \"Sign up Now\" on the right side of the page.     You will be asked to enter the access code listed below, as well as some personal information. Please follow the directions to create your username and password.     Your access code is: [email or web address]  Expires: 2017 10:02 AM     Your access code will  in 90 days. If you need help or a new code, please call your [facility] clinic or [phone].        Care EveryWhere ID     This is your Care EveryWhere ID. This could be used by other organizations to access your [facility] medical records  [id number]        Your Vitals Were     Pulse Height Pulse Oximetry BMI (Body Mass Index)          68 1.549 " "m (5' 0.98\") 97% 24.39 kg/m2         Blood Pressure from Last 3 Encounters:   09/07/17 97/56   08/24/17 112/52   04/19/17 114/53    Weight from Last 3 Encounters:   09/07/17 58.5 kg (129 lb)   08/24/17 57.6 kg (127 lb)   04/19/17 58.5 kg (129 lb)              We Performed the Following     Comprehensive DME     SLEEP EVALUATION & MANAGEMENT REFERRAL - ADULT        Primary Care Provider Office Phone # Fax #    Susan Pena PA-C [phone] [address]  [place] [address]        Equal Access to Services     ANDREA BLACK : Hadii dl melo hadasho Sovivian, waaxda darrick, qaybta kaalmada adenavidyada, valeria garcia . So [facility] [phone].    ATENCIÓN: Si habla español, tiene a yip disposición servicios gratuitos de asistencia lingüística. Llame al [phone].    We comply with applicable federal civil rights laws and [place] laws. We do not discriminate on the basis of race, color, national origin, age, disability sex, sexual orientation or gender identity.            Thank you!     Thank you for choosing [facility]  for your care. Our goal is always to provide you with excellent care. Hearing back from our patients is one way we can continue to improve our services. Please take a few minutes to complete the written survey that you may receive in the mail after your visit with us. Thank you!             Your Updated Medication List - Protect others around you: Learn how to safely use, store and throw away your medicines at www.disposemymeds.org.          This list is accurate as of: 9/7/17  2:51 PM.  Always use your most recent med list.                   Brand Name Dispense Instructions for use Diagnosis    amLODIPine 5 MG tablet    NORVASC    90 tablet    Take 1 tablet (5 mg) by mouth daily        calcium carbonate 1250 MG tablet    OS-ALEXI 500 mg [place]. Ca     Take 500 mg by mouth daily        cholecalciferol 1000 UNITS capsule    vitamin  -D "     Take 1 capsule by mouth daily.        fish oil-omega-3 fatty acids 1000 MG capsule      Take 1 capsule by mouth daily.        labetalol 200 MG tablet    NORMODYNE    90 tablet    TAKE 1 TABLET BY MOUTH 3 TIMES DAILY.        lactobacillus rhamnosus (GG) 10 B CELL capsule    CULTURELLE     Take 1 capsule by mouth daily.        losartan 50 MG tablet    COZAAR    90 tablet    Take 1 tablet (50 mg) by mouth daily    Essential hypertension, benign       MAGNESIUM OXIDE PO      Take 200 mg by mouth daily Reported on 4/19/2017        polyethylene glycol powder    MIRALAX/GLYCOLAX     Take 1 capful by mouth as needed.        raloxifene 60 MG tablet    Evista    30 tablet    Take 1 tablet (60 mg) by mouth daily    Osteoporosis       scopolamine 72 hr patch    TRANSDERM    2 patch    Place 1 patch onto the skin every 72 hours.  Apply to hairless area behind one ear at least 4 hours before travel.  Remove old patch and change every 3 days.    History of motion sickness       traZODone 50 MG tablet    DESYREL    90 tablet    Take 1 tablet (50 mg) by mouth nightly as needed for sleep    Primary insomnia       TYLENOL PO      Take 500 mg by mouth every 4 hours as needed        VITAMIN C PO      Take 500 mg by mouth daily        zolpidem 5 MG tablet    AMBIEN    30 tablet    Take 0.5 tablets (2.5 mg) by mouth nightly as needed for sleep    Primary insomnia

## 2017-09-07 NOTE — PROGRESS NOTES
Sleep Consultation:    Date on this visit: 9/7/2017    Karina Lopez is sent by Kenn Jean-Baptiste for a sleep consultation regarding ASH.    Primary Physician: Susan Pena     Karina Lopez reports waking herself with her own snoring and feeling tired in the morning for 3+ years. Her medical history is significant for HTN, depression, aortic valve insufficiency, CKD st 3, osteoporosis and macular dystrophy. She had an aortic dissection in 2012. A recent ECHO shows 1-2+ mitral and aortic regurgitation.    Karina had a PSG here 2 years ago. It showed: AHI of 14.1 per hour; however, sleep apnea was entirely supine dependent with a supine AHI of 70.6 per hour, compared to a lateral AHI of 2.9 per hour.  Oxygen desaturation april in the supine position was 85%.  Rhythmic leg movements were 42.6 per hour with less than 1 arousal per hour.    Karina goes to sleep at 11:00 PM during the week. She wakes up at 7:00 AM to an alarm. Sometimes she goes back to sleep and dreams a lot until 8:30-9:00 AM. That is her best sleep. She falls asleep in 30 minutes.  Karina has difficulty falling asleep. She takes trazodone 50 mg nightly but does not think it is really working anymore. She has been on that for about 2 years. She has a prescription for zolpidem, but only takes that when she travels. She wakes up 5 times a night for 5-30 minutes before falling back to sleep.  Karina wakes up to go to the bathroom (1-2 times) and from her own snoring.  On weekends, her sleep schedule is the same.  Patient gets an average of 5-6 hours of sleep per night.     Patient does read in bed and does not use electronics in bed, watch TV in bed. She does worry in bed about other people's health and unfinished business.     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 She lives alone.      Karina does snore. She has travelled with people and they always say she snores. She hears herself snore or snort. Patient does  not have a regular bed partner. There is no report of gasping, snorting or witnessed apneas.  Patient sleeps on her side and sometimes stomach. She has occasional morning dry mouth, morning headaches (off and on, twice in the last week) and morning confusion (gets distracted doing tasks and forgets to finish them sometimes). She gets restlessness in her legs in the evening or sometimes if at a concert. That goes in bouts with long periods of being asymptomatic. RLS may interfere with getting to sleep although it seems to get better when she lays down. She has a history of anemia after the aortic dissection. Karina denies any bruxism, sleep walking, sleep talking, dream enactment, sleep paralysis, cataplexy and hypnogogic/hypnopompic hallucinations.    Karina denies difficulty breathing through her nose, claustrophobia, reflux at night, heartburn and depression.      Karina has gained 0-5 pounds in 2 years.  Patient describes themself as a morning person. She says she is sharper in the morning.  She would prefer to go to sleep at 11:00 PM and wake up at 8:30-9:00 AM.  Patient's [place] Sleepiness score 18/24 consistent with excessive daytime sleepiness.      Karina naps 4 times per week for 5-10 minutes, feels refreshed after naps. She takes frequent inadvertant naps.  She admits dozing while driving only on long drives, which she avoids now. Patient was counseled on the importance of driving while alert, to pull over if drowsy, or nap before getting into the vehicle if sleepy.  She uses 2 cups/day of coffee. Last caffeine intake is usually before noon.    Allergies:    Allergies   Allergen Reactions     Lisinopril      Cough       Simvastatin      myalgias       Medications:    Current Outpatient Prescriptions   Medication Sig Dispense Refill     zolpidem (AMBIEN) 5 MG tablet Take 0.5 tablets (2.5 mg) by mouth nightly as needed for sleep 30 tablet 5     raloxifene (EVISTA) 60 MG tablet Take 1 tablet (60 mg) by mouth  daily 30 tablet 11     losartan (COZAAR) 50 MG tablet Take 1 tablet (50 mg) by mouth daily 90 tablet 3     traZODone (DESYREL) 50 MG tablet Take 1 tablet (50 mg) by mouth nightly as needed for sleep 90 tablet 3     labetalol (NORMODYNE) 200 MG tablet TAKE 1 TABLET BY MOUTH 3 TIMES DAILY. 90 tablet 3     amLODIPine (NORVASC) 5 MG tablet Take 1 tablet (5 mg) by mouth daily 90 tablet 3     scopolamine (TRANSDERM) (1.5mg base/3day) patch Place 1 patch onto the skin every 72 hours.  Apply to hairless area behind one ear at least 4 hours before travel.  Remove old patch and change every 3 days. 2 patch 1     Acetaminophen (TYLENOL PO) Take 500 mg by mouth every 4 hours as needed       Ascorbic Acid (VITAMIN C PO) Take 500 mg by mouth daily       calcium carbonate (OS-ALEXI 500 MG [place]. [place]) 500 MG tablet Take 500 mg by mouth daily       MAGNESIUM OXIDE PO Take 200 mg by mouth daily Reported on 4/19/2017       fish oil-omega-3 fatty acids (FISH OIL) 1000 MG capsule Take 1 capsule by mouth daily.       lactobacillus rhamnosus, GG, (CULTURELLE) 10 B CELL capsule Take 1 capsule by mouth daily.       cholecalciferol (VITAMIN D3) 1000 UNITS capsule Take 1 capsule by mouth daily.       polyethylene glycol (MIRALAX/GLYCOLAX) powder Take 1 capful by mouth as needed.         Problem List:  Patient Active Problem List    Diagnosis Date Noted     Aortic valve insufficiency, etiology of cardiac valve disease unspecified 04/13/2016     Priority: Medium     Hyperlipidemia LDL goal <100 12/16/2015     Priority: Medium     CKD (chronic kidney disease) stage 3, GFR 30-59 ml/min 12/09/2015     Priority: Medium     ASH (obstructive sleep apnea)(mild AHI=14) 04/12/2015     Priority: Medium     Snoring 02/25/2015     Priority: Medium     Vertigo 02/19/2015     Priority: Medium     Bruit      Priority: Medium     Dizziness      Priority: Medium     Anemia      Priority: Medium     Cough due to ACE inhibitor 10/07/2013     Priority: Medium     Left  arm pain 09/26/2013     Priority: Medium     Health Care Home 04/10/2013     Priority: Medium     EMERGENCY CARE PLAN  Presenting Problem Signs and Symptoms Treatment Plan    Questions or conerns during clinic hours    I will call the clinic directly     Questions or conerns outside clinic hours    I will call the 24 hour nurse line at [phone]    Patient needs to schedule an appointment    I will call the 24 hour scheduling team at [phone] or clinic directly    Same day treatment     I will call the clinic first, nurse line if after hours, urgent care and express care if needed                        Carmelina Fernández RN, MS [facility] Care Coordinator  [phone]           Mild major depression (H) 12/18/2012     Priority: Medium     Constipation 12/18/2012     Priority: Medium     HTN (hypertension), benign      Priority: Medium     Hx of repair of dissecting thoracic aortic aneurysm, [place] type A      Priority: Medium     Repair 2012  Hemashield graft and resuspension of the aortic valve         Advance care planning 08/14/2012     Priority: Medium     Discussed advance care planning with patient; information given to patient to review. 8/14/2012     Honoring choices letter mailed.  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     Social History Main Topics     Smoking status: Former Smoker     Start date: 1/1/1974     Smokeless tobacco: Never Used      Comment: light smoker until 1974     Alcohol use 1.8 oz/week     3 Glasses of wine per week      Comment: 2 glasses of wine with dinner about 3 times per week     Drug use: No     Sexual activity: Yes     Partners: Male     Other Topics Concern     Parent/Sibling W/ Cabg, Mi Or Angioplasty Before 65f 55m? 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       Family History:  Family History   Problem Relation Age of Onset     Hypertension Father      best syndrome     CANCER Father      lung     Hypertension Paternal Grandmother      stroke     Alcohol/Drug Brother      best syndr       Review of Systems:  A complete review of systems reviewed by me is negative with the exeption of what has been mentioned in the history of present illness.  CONSTITUTIONAL: NEGATIVE for weight gain/loss, fever, chills, sweats or night sweats, drug allergies.  EYES: NEGATIVE for changes in vision, blind spots, double vision.  ENT: NEGATIVE for ear pain, sore throat, sinus pain, post-nasal drip, runny nose, bloody nose  CARDIAC: NEGATIVE for fast heartbeats or fluttering in chest, chest pain or pressure, breathlessness when lying flat, swollen legs or swollen feet.  CARDIAC:  POSITIVE for  HTN  NEUROLOGIC: NEGATIVE numbness in the arms or legs.  NEUROLOGIC:  POSITIVE for  headaches and weakness in the arms or legs  DERMATOLOGIC: NEGATIVE for rashes, new moles or change in mole(s)  PULMONARY: NEGATIVE SOB at rest, SOB with activity, dry cough, productive  "cough, coughing up blood, wheezing or whistling when breathing.    GASTROINTESTINAL: NEGATIVE for nausea or vomitting, loose or watery stools, fat or grease in stools, constipation, bowel movements black in color or blood noted.  GASTROINTESTINAL:  POSITIVE for  abdominal pain  GENITOURINARY: NEGATIVE for pain during urination, blood in urine, urinating more frequently than usual, irregular menstrual periods.  MUSCULOSKELETAL: NEGATIVE for bone or joint pain, swollen joints.  MUSCULOSKELETAL:  POSITIVE for  muscle pain  ENDOCRINE: NEGATIVE for increased thirst or urination, diabetes.  LYMPHATIC: NEGATIVE for swollen lymph nodes, lumps or bumps in the breasts or nipple discharge.    Physical Examination:  Vitals: BP 97/56  Pulse 68  Ht 1.549 m (5' 0.98\")  Wt 58.5 kg (129 lb)  SpO2 97%  BMI 24.39 kg/m2  BMI= Body mass index is 24.39 kg/(m^2).    Neck Cir (cm): 35 cm    [place] Total Score 9/7/2017   Total score - [place] 18       GENERAL APPEARANCE: healthy, alert, no distress and cooperative  EYES: Eyes grossly normal to inspection, PERRL, conjunctivae and sclerae normal and lids and lashes normal  HENT: nose and mouth without ulcers or lesions, oropharynx crowded, tongue base enlarged  NECK: no adenopathy and thyroid normal to palpation, lump at sternocleidal notch  RESP: lungs clear to auscultation - no rales, rhonchi or wheezes  CV: regular rates and rhythm, normal S1 S2, no S3 or S4 and no irregular beats, 2/6 systolic murmur at the upper right sternal border.  LYMPHATICS: normal ant/post cervical and supraclavicular nodes  MS: extremities normal- no gross deformities noted  NEURO: Normal strength and tone, mentation intact, speech normal and cranial nerves 2-12 intact  Mallampati Class: IV.  Tonsillar Stage: 1  hidden by pillars.    Impression/Plan:    (G47.33) ASH (obstructive sleep apnea)(mild AHI=14)  (primary encounter diagnosis), (R53.83) Other fatigue  Comment: Karina had mild, positional ASH " diagnosed in here 2015. She was treated with positional restriction. She feels she continues to only sleeps on her side. However, she has been noticing waking herself with a snort fairly often. This is new in the last couple of years. She is also very tired in the daytime. Her weight is essentially unchanged. She is interested in trying CPAP. She does not think she would tolerate a dental appliance. No other risk factors have changed since her sleep study.  Plan: Comprehensive DME        Auto CPAP 5-15 cm, heated humidifier and compliance meter.     (G25.81) Restless legs syndrome (RLS)  Comment: she has bouts of restlessness, often improved by laying down. History of anemia after dissection. Hgb and Hct have been normal, no ferritin. Has CKD st 3  Plan: Will re-evaluate after being on CPAP. May check ferritin.    (F51.04) Chronic insomnia  Comment: She has some difficulty falling asleep and wakes 5 times per night on average. She wakes with her own snoring and wakes 1-2 times for the restroom. She spends 8-10 hours in bed at night and takes short naps on about half of days. She gets her best sleep from 7-9 AM.  Plan: We talked about reducing time in bed to 8 hours, midnight to 8 AM.       Literature provided regarding sleep apnea.      She will follow up with me in approximately two months.       Polysomnography reviewed.  Obstructive sleep apnea reviewed.  Complications of untreated sleep apnea were reviewed.  45 minutes was spent during this visit, over 50% in counseling and coordination of care.   Bennett Goltz, PA-C    CC: Kenn Jean-Baptiste

## 2017-09-08 ENCOUNTER — DOCUMENTATION ONLY (OUTPATIENT)
Dept: SLEEP MEDICINE | Facility: CLINIC | Age: 77
End: 2017-09-08

## 2017-09-11 ENCOUNTER — DOCUMENTATION ONLY (OUTPATIENT)
Dept: SLEEP MEDICINE | Facility: CLINIC | Age: 77
End: 2017-09-11

## 2017-09-11 NOTE — PROGRESS NOTES
3 DAY STM VISIT    Patient contacted for 3 day STM visit  Subjective measures:  Pt was struggling feeling that the pressure was different at the clinic vs at home.  Discussed pressure settings are currently the same as when in the clinic and she may just be adjusting to the pressures.  She is going on vacation to [place] for 10 days and will be bring her device with her.      Device type: Auto-CPAP  PAP settings: CPAP min 5 cm  H20     CPAP max 15 cm  H20       Assessment: Nightly usage, most nights under four hours.   Action plan: Pt to have f/u 14 day STM visit.  Pt was instructed to call back in before 14 day visit with any concerns or complaints.

## 2017-09-16 DIAGNOSIS — I10 HTN (HYPERTENSION), BENIGN: ICD-10-CM

## 2017-09-16 NOTE — TELEPHONE ENCOUNTER
labetalol (NORMODYNE) 200 MG tablet      Last Written Prescription Date: 4/19/17  Last Fill Quantity: 90, # refills: 3    Last Office Visit with G, P or [facility] prescribing provider:  4/19/17   Future Office Visit:        BP Readings from Last 3 Encounters:   09/07/17 97/56   08/24/17 112/52   04/19/17 114/53         Natalie HARTR)

## 2017-09-18 RX ORDER — LABETALOL 200 MG/1
TABLET, FILM COATED ORAL
Qty: 270 TABLET | Refills: 1 | Status: SHIPPED | OUTPATIENT
Start: 2017-09-18 | End: 2018-03-15

## 2017-09-18 NOTE — TELEPHONE ENCOUNTER
Prescription approved per [facility].  Karina MILAN RN,BSN

## 2017-09-25 ENCOUNTER — DOCUMENTATION ONLY (OUTPATIENT)
Dept: SLEEP MEDICINE | Facility: CLINIC | Age: 77
End: 2017-09-25

## 2017-09-25 NOTE — PROGRESS NOTES
14 DAY STM VISIT    Message left for patient to return call     Assessment: Pt not meeting objective benchmarks for compliance     Action plan: waiting for patient to return call.  and pt to have 30 day STM visit.   Device type: Auto-CPAP  PAP settings: CPAP min 5 cm  H20     CPAP max 15 cm  H20     90th % pressure7.9 cm  H20    Objective measures: 14 day rolling measures         Compliance  20 %      % of night spent in large leak  1 % last  upload      AHI 5.78   last  upload      Average number of minutes 178            Objective measure goal  Compliance   Goal >70%  Leak   Goal < 10%  AHI  Goal < 5  Usage  Goal >240

## 2017-10-06 ENCOUNTER — DOCUMENTATION ONLY (OUTPATIENT)
Dept: SLEEP MEDICINE | Facility: CLINIC | Age: 77
End: 2017-10-06

## 2017-10-06 NOTE — PROGRESS NOTES
30 DAY STM VISIT    Message left for patient to return call     Assessment: Pt not meeting objective benchmarks for compliance     Action plan: waiting for patient to return call.  and 2 week STM recheck appt scheduled  Patient has a follow up visit with Bennett Goltz, PA on 10/26/17  Device type: Auto-CPAP  PAP settings: CPAP min 5 cm  H20     CPAP max 15 cm  H20     90th % [id number] cm  H20    Objective measures: 14 day rolling measures         Compliance  14 %     % of night spent in large leak  0 % last  upload      AHI 6.24   last  upload      Average number of minutes 120          Objective measure goal  Compliance   Goal >70%  Leak   Goal < 10%  AHI  Goal < 5  Usage  Goal >240

## 2017-10-26 ENCOUNTER — OFFICE VISIT (OUTPATIENT)
Dept: SLEEP MEDICINE | Facility: CLINIC | Age: 77
End: 2017-10-26
Payer: COMMERCIAL

## 2017-10-26 VITALS
OXYGEN SATURATION: 94 % | SYSTOLIC BLOOD PRESSURE: 118 MMHG | WEIGHT: 129 LBS | BODY MASS INDEX: 23.74 KG/M2 | HEART RATE: 64 BPM | DIASTOLIC BLOOD PRESSURE: 51 MMHG | HEIGHT: 62 IN | RESPIRATION RATE: 14 BRPM

## 2017-10-26 DIAGNOSIS — G47.33 OSA (OBSTRUCTIVE SLEEP APNEA): Primary | ICD-10-CM

## 2017-10-26 DIAGNOSIS — G47.00 INSOMNIA, UNSPECIFIED TYPE: ICD-10-CM

## 2017-10-26 PROCEDURE — 99214 OFFICE O/P EST MOD 30 MIN: CPT | Performed by: PHYSICIAN ASSISTANT

## 2017-10-26 NOTE — PATIENT INSTRUCTIONS
General recommendations for sleep problems (Insomnia)  Allow 2-4 weeks to see results    Try to only allow yourself in bed between 11 PM and 7 AM. Avoid naps or inadvertent dozing outside of your allotted time in bed. Get sunlight when you wake and try to avoid bright light in the 30 minutes before bedtime. Try to get physical activity if you start to feel drowsy in the daytime.   Try 1 mg melatonin about 15-30 minutes prior to bedtime.      Establish a regular sleep schedule    Most people only need 7-8 hours of sleep.  Don't be in bed longer than you need     to sleep or you will end up spending more time awake in bed. This trains your    brain to think of the bed as a place to not sleep.  Go to bed at same time each night   Get up at same time each day - Set an alarm everyday (even weekends). This is one of    the most important tips. It prevents you from relying on your insomnia to get you    up on time for your day. That actually reinforces insomnia. It also will help your    body get into a pattern where you start feeling tired at a consistent time each    night.  The body functions best when you keep a consistent routine.  Avoid sleeping-in and napping. Anytime you sleep during the day, you will be less tired at    night. You may be tired enough to fall asleep, but you will wake more in the    middle of the night because you will have met your sleep need before the night is    done.   Cut down time in bed (if not asleep, get up)- Use your bed only for sleep and sex    Anytime you spend time in bed doing activities other than sleep (reading,    watching TV, working, playing on the computer or phone, or even just laying in    bed trying to sleep), you are training  your brain to think of the bed as a place to    do activities other than sleep. If you are not falling asleep within 20-30 minutes,    get out of bed. While out of bed, avoid bright lights. Avoid work or chores. Being    productive in the middle of  the night reinforces waking up at night. Find relaxing,    not particularly entertaining activities like reading, listening to music, or relaxation    exercises. Go back to bed if you start feeling groggy, or after about 30 minutes,    even if not feeling very tired. Sometimes, just getting out of bed stops the pattern    of getting frustrated about laying in bed not sleeping, and that can help you fall    asleep.   Avoid trying to force yourself to sleep- sleep is not like everything else. The harder you    work at most things, the more you can accomplish. The harder you work at    sleep, the less you will sleep.     Make the bedroom comfortable - quiet, dark and cool are better. Consider ear plugs    (silicon). Use dark blinds or wear an eye mask if needed     Make a relaxing routine prior to bedtime  Relaxation exercises:   Progressive muscle relaxation: Relax each muscle group individually    Begin with your feet, flex, then relax. Try to imagine your feet feeling heavy and sinking into the bed. Move to your calves, do the same thing. Work through each muscle group toward your head.    Relaxing Mental Imagery: Try to imagine a trip that you took and found relaxing, or imagine a day at the beach. Try to walk yourself through the day in your mind as if you were dreaming it. Try to imagine sensing the different experiences, such as feeling sand between your toes, the heat of the sun on your skin, seeing the waves crashing the shore, the smell of the salt water, etc.     Deal with your worries before bedtime    Set aside a worry time around dinner time for 10-15 minutes. Write down the    things that are on your mind. Plan time in the coming days to address those    issues. Brainstorm ideas on how you will deal with them. Try to identify issues    that are out of your control, and try to let those issues go.  Listen to relaxation tapes   Classical Music or Nature sounds   Back Massage   Get regular exercise each day  (at least 1-2 hours before bedtime)   Take medications only as directed   Eat a light bedtime snack or warm drink   Warm milk   Warm herbal tea (non-caffeinated)       Things to avoid   No overstimulating activities just before bed   No competitive games before bedtime   No exciting television programs before bedtime   Avoid caffeine after lunchtime   Avoid chocolate   Do not use alcohol to induce sleep (worsens Insomnia)   Do not take someone else's sleeping pills   Do not look at the clock when awakening   Do not turn on light when getting up to use bathroom, use a nightlight     Online Programs     www.[employer] (pronounced shut eye). There is a fee for this program. Enter the code  [place]  if you decide to enroll in this program.      www.sleepIO.com (pronounced sleep ee oh). There is a fee for this program. Enter the code  [place]  if you decide to enroll in this program.     Suggested Resources  Insomnia Treatment Books     Overcoming Insomnia by Trevon Craven and Aisha Natarajan (2008)    No More Sleepless Nights by Dhruv Huang and Kiley Ruvalcaba (1996)    Say [place] to Insomnia by Shady Humphrey (2009)    The Insomnia Workbook by Mónica Richmond and Dima Hansen (2009)    The Insomnia Answer by Miko Santos and Pj Ennis (2006)      Stress Management and Relaxation Books    The Relaxation and Stress Reduction Workbook by Lashay Beyer, Maricel Gannon and Ned Mcdermott (2008)    Stress Management Workbook: Techniques and Self-Assessment Procedures by Beth Joyce and Cristiano Kearney (1997)    A Mindfulness-Based Stress Reduction Workbook by Henok Lynn and Debbie Merchant (2010)    The Complete Stress Management Workbook by Anup Lundberg, Sanford Gray and Ranjeet Bland (1996)    Assert Yourself by Nessa Horton and Khris Horton (1977)    Relaxation Resources for Computer Download   These websites offer resources to help you relax. This list is for information  only. [facility] is not responsible for the quality of services or the actions of any person or organization.  Progressive Muscle Relaxation (PMR):     http://www.[employer]/progressive-muscle-relaxation-exercise.html     http://studentsupport.[facility]/counseling/resources/self-help/relaxation-and-stress-management/   Deep Breathing Exercises:    http://www.[employer]TV/breathing-awareness.html     Meditation:     wwwadRise    www.enercastmeditation-site.com You may have to pay for some of these resources.    Guided Imagery:    http://www.[employer]TV/guided-imagery-scripts.html     http://[employer]/library/uulzdnblbj-bshfug-ngpgvcp/     Counseling / Behavioral Health  [facility]  Visit www.[email or web address] or call [phone] to find a clinic close to you.  Or call [phone] for [facility].

## 2017-10-26 NOTE — NURSING NOTE
"Chief Complaint   Patient presents with     CPAP Follow Up     Follow up precious       Initial /51  Pulse 64  Resp 14  Ht 1.575 m (5' 2\")  Wt 58.5 kg (129 lb)  SpO2 94%  BMI 23.59 kg/m2 Estimated body mass index is 23.59 kg/(m^2) as calculated from the following:    Height as of this encounter: 1.575 m (5' 2\").    Weight as of this encounter: 58.5 kg (129 lb).  Medication Reconciliation: complete   ESS 8  Glory Heredia MA      "

## 2017-10-26 NOTE — PROGRESS NOTES
Sleep Study Follow-Up Visit:    Date on this visit: 10/26/2017    Karina Lopez comes in today for follow-up of her treatment for mild ASH, insomnia and possible restless legs. She was initially seen at the [facility] for waking herself with her own snoring and feeling tired in the morning for 3+ years. Her medical history is significant for HTN, depression, aortic valve insufficiency, CKD st 3, osteoporosis and macular dystrophy. She had an aortic dissection in 2012. A recent ECHO shows 1-2+ mitral and aortic regurgitation.     Karina had a PSG here 2 years ago. It showed: AHI of 14.1 per hour; however, sleep apnea was entirely supine dependent with a supine AHI of 70.6 per hour, compared to a lateral AHI of 2.9 per hour.  Oxygen desaturation april in the supine position was 85%.  Rhythmic leg movements were 42.6 per hour with less than 1 arousal per hour.  She is on auto CPAP 5-15 cm. She feels things are going sporadically. She feels she is a restless sleeper, tossing and turning. She uses a Vizcaino nasal pillows mask (not the Cece headgear). She thinks the mask may not stay in the right place. She was getting sore cheek bones from strapping the mask too tight. She has questions about how to use the humidifier. She only tried putting water in it once and it leaked. She gets a lot dry nose and mouth. She is not sure if she has mouth leak. She thinks the pressure could be higher. She sometimes does not put it back on after getting up for the restroom. She got a cold when she was travelling. She was not observed to snore when she was using it.   The compliance data shows that she has used the CPAP for 24/30 nights, 26.7% of nights for >4 hours.  The 90th% pressure is 9.3 cm.  The average time in large leak is 38 sec.  The average nightly usage is 3:22.  The average AHI is 5.9/hr (1.1/hr are centrals).  She often falls asleep reading. She tries to get to bed around 11 PM. She is working on the  consistency of that. She wakes between 11 PM to 1 AM to put the CPAP on. She is out of bed between 6-8 AM. She does not think she sleeps supine, although she has been trying to so the mask will stay in place better. She naps inconsistently, depending on how well she sleeps.   She takes trazodone 50 mg still. She rarely will take 5 mg zolpidem as well. She notes she sleeps better when she exercises more.   She has not been getting restlessness in her legs recently.   She has not noticed waking with a snorts or snoring since having the CPAP.    Past medical/surgical history, family history, social history, medications and allergies were reviewed.      Problem List:  Patient Active Problem List    Diagnosis Date Noted     Aortic valve insufficiency, etiology of cardiac valve disease unspecified 04/13/2016     Priority: Medium     Hyperlipidemia LDL goal <100 12/16/2015     Priority: Medium     CKD (chronic kidney disease) stage 3, GFR 30-59 ml/min 12/09/2015     Priority: Medium     ASH (obstructive sleep apnea)(mild AHI=14) 04/12/2015     Priority: Medium     Snoring 02/25/2015     Priority: Medium     Vertigo 02/19/2015     Priority: Medium     Bruit      Priority: Medium     Dizziness      Priority: Medium     Anemia      Priority: Medium     Cough due to ACE inhibitor 10/07/2013     Priority: Medium     Left arm pain 09/26/2013     Priority: Medium     Health Care Home 04/10/2013     Priority: Medium     EMERGENCY CARE PLAN  Presenting Problem Signs and Symptoms Treatment Plan    Questions or conerns during clinic hours    I will call the clinic directly     Questions or conerns outside clinic hours    I will call the 24 hour nurse line at [phone]    Patient needs to schedule an appointment    I will call the 24 hour scheduling team at [phone] or clinic directly    Same day treatment     I will call the clinic first, nurse line if after hours, urgent care and express care if needed                         Carmelina Fernández RN, MS [facility] Care Coordinator  [phone]           Mild major depression (H) 12/18/2012     Priority: Medium     Constipation 12/18/2012     Priority: Medium     HTN (hypertension), benign      Priority: Medium     Hx of repair of dissecting thoracic aortic aneurysm, Vasile type A      Priority: Medium     Repair 2012  Hemashield graft and resuspension of the aortic valve         Advance care planning 08/14/2012     Priority: Medium     Discussed advance care planning with patient; information given to patient to review. 8/14/2012     Honoring choices letter mailed.  9-  RT Tomas (R)         Best vitelliform macular dystrophy      Priority: Medium     Osteoporosis, postmenopausal      Priority: Medium     Insomnia      Priority: Medium        Impression/Plan:    (G47.33) ASH (obstructive sleep apnea)(mild AHI=14)  (primary encounter diagnosis)  Comment: Usage is low. Possible mouth leak. She has not been using the humidifier because she was not sure how to use it. She is looking forward to using the humidifier.  Plan: Continue auto CPAP 5-15 cm. We worked on mask fit, it seemed to fit well. We also reviewed how full to fill the humidifier and how to adjust those settings. We reviewed usage goals. She was advised to avoid supine sleep if she is not going to use CPAP.    (G47.00) Insomnia, unspecified type  Comment: She primarily has difficulty staying asleep. She often reads in bed and will fall asleep without CPAP and may wake up to a few hours later and put the CPAP on. Her sleep schedule is variable and her sleep opportunity is likely excessive. At the last visit, I had instructed her to only be in bed between midnight and 8 AM, but she continues to get into bed at 11 PM to read and I think she often falls asleep before midnight. She takes 50 mg trazodone and occasionally will take 5 mg zolpidem as well.  Plan: Continue to work on reducing time in bed to no more  than 8 hours. I have recommended trying to keep a sleep schedule of 11 PM to 7 AM. Avoid naps or sleeping in. We also talked about regulating light exposure and she is going to look into getting a SAD lamp. I also advised her to try 1 mg melatonin about 15 minutes before bedtime. Try to avoid using zolpidem.      She will follow up with me in about 2 month(s).     Twenty-five minutes spent with patient, all of which were spent face-to-face counseling, consulting, coordinating plan of care.      Bennett Goltz, PA-C    CC: No ref. provider found

## 2017-10-26 NOTE — MR AVS SNAPSHOT
After Visit Summary   10/26/2017    Karina Lopez    MRN: [id number]           Patient Information     Date Of Birth          1940        Visit Information        Provider Department      10/26/2017 9:30 AM Goltz, Bennett Ezra, PA-C [facility] Instructions    General recommendations for sleep problems (Insomnia)  Allow 2-4 weeks to see results    Try to only allow yourself in bed between 11 PM and 7 AM. Avoid naps or inadvertent dozing outside of your allotted time in bed. Get sunlight when you wake and try to avoid bright light in the 30 minutes before bedtime. Try to get physical activity if you start to feel drowsy in the daytime.   Try 1 mg melatonin about 15-30 minutes prior to bedtime.      Establish a regular sleep schedule    Most people only need 7-8 hours of sleep.  Don't be in bed longer than you need     to sleep or you will end up spending more time awake in bed. This trains your    brain to think of the bed as a place to not sleep.  Go to bed at same time each night   Get up at same time each day - Set an alarm everyday (even weekends). This is one of    the most important tips. It prevents you from relying on your insomnia to get you    up on time for your day. That actually reinforces insomnia. It also will help your    body get into a pattern where you start feeling tired at a consistent time each    night.  The body functions best when you keep a consistent routine.  Avoid sleeping-in and napping. Anytime you sleep during the day, you will be less tired at    night. You may be tired enough to fall asleep, but you will wake more in the    middle of the night because you will have met your sleep need before the night is    done.   Cut down time in bed (if not asleep, get up)- Use your bed only for sleep and sex    Anytime you spend time in bed doing activities other than sleep (reading,    watching TV, working, playing on the computer or phone, or  even just laying in    bed trying to sleep), you are training  your brain to think of the bed as a place to    do activities other than sleep. If you are not falling asleep within 20-30 minutes,    get out of bed. While out of bed, avoid bright lights. Avoid work or chores. Being    productive in the middle of the night reinforces waking up at night. Find relaxing,    not particularly entertaining activities like reading, listening to music, or relaxation    exercises. Go back to bed if you start feeling groggy, or after about 30 minutes,    even if not feeling very tired. Sometimes, just getting out of bed stops the pattern    of getting frustrated about laying in bed not sleeping, and that can help you fall    asleep.   Avoid trying to force yourself to sleep- sleep is not like everything else. The harder you    work at most things, the more you can accomplish. The harder you work at    sleep, the less you will sleep.     Make the bedroom comfortable - quiet, dark and cool are better. Consider ear plugs    (silicon). Use dark blinds or wear an eye mask if needed     Make a relaxing routine prior to bedtime  Relaxation exercises:   Progressive muscle relaxation: Relax each muscle group individually    Begin with your feet, flex, then relax. Try to imagine your feet feeling heavy and sinking into the bed. Move to your calves, do the same thing. Work through each muscle group toward your head.    Relaxing Mental Imagery: Try to imagine a trip that you took and found relaxing, or imagine a day at the beach. Try to walk yourself through the day in your mind as if you were dreaming it. Try to imagine sensing the different experiences, such as feeling sand between your toes, the heat of the sun on your skin, seeing the waves crashing the shore, the smell of the salt water, etc.     Deal with your worries before bedtime    Set aside a worry time around dinner time for 10-15 minutes. Write down the    things that are on  your mind. Plan time in the coming days to address those    issues. Brainstorm ideas on how you will deal with them. Try to identify issues    that are out of your control, and try to let those issues go.  Listen to relaxation tapes   Classical Music or Nature sounds   Back Massage   Get regular exercise each day (at least 1-2 hours before bedtime)   Take medications only as directed   Eat a light bedtime snack or warm drink   Warm milk   Warm herbal tea (non-caffeinated)       Things to avoid   No overstimulating activities just before bed   No competitive games before bedtime   No exciting television programs before bedtime   Avoid caffeine after lunchtime   Avoid chocolate   Do not use alcohol to induce sleep (worsens Insomnia)   Do not take someone else's sleeping pills   Do not look at the clock when awakening   Do not turn on light when getting up to use bathroom, use a nightlight     Online Programs     www.RentFeeder (pronounced shut eye). There is a fee for this program. Enter the code  [place]  if you decide to enroll in this program.      www.sleepIO.com (pronounced sleep ee oh). There is a fee for this program. Enter the code  [place]  if you decide to enroll in this program.     Suggested Resources  Insomnia Treatment Books     Overcoming Insomnia by Trevon Craven and Aisha Natarajan (2008)    No More Sleepless Nights by Dhruv Huang and Kiley Ruvalcaba (1996)    Say [place] to Insomnia by Shady Humphrey (2009)    The Insomnia Workbook by Mónica Richmond and Dima Hansen (2009)    The Insomnia Answer by Miko Santos and Pj Ennis (2006)      Stress Management and Relaxation Books    The Relaxation and Stress Reduction Workbook by Lashay Beyer, Maricel Gannon and Ned Mcdermott (2008)    Stress Management Workbook: Techniques and Self-Assessment Procedures by Beth Joyce and Cristiano Kearney (1997)    A Mindfulness-Based Stress Reduction Workbook by Henok Lynn and Debbie  Shonda (2010)    The Complete Stress Management Workbook by Anup Lundberg, Sanford Gray and Ranjeet Bland (1996)    Assert Yourself by Nessa Horton and Khris Horton (1977)    Relaxation Resources for Computer Download   These websites offer resources to help you relax. This list is for information only. [facility] is not responsible for the quality of services or the actions of any person or organization.  Progressive Muscle Relaxation (PMR):     http://www.[place]/progressive-muscle-relaxation-exercise.html     http://studentsupport.[facility]/counseling/resources/self-help/relaxation-and-stress-management/   Deep Breathing Exercises:    http://www.[place]/breathing-awareness.html     Meditation:     wwwCaseTrek    www.Rund[employer] Spottermeditation-site.2houses You may have to pay for some of these resources.    Guided Imagery:    http://www.[place]/guided-imagery-scripts.html     http://Brandma.co/library/audxnxfvwx-aaogjm-poaaoid/     Counseling / Behavioral Health  [facility]  Visit [email or web address] or call [phone] to find a clinic close to you.  Or call [phone] for [facility].              Follow-ups after your visit        Follow-up notes from your care team     Return in about 2 months (around 12/26/2017) for Insomnia follow up.      Your next 10 appointments already scheduled     Dec 28, 2017 11:30 AM CST   Return Sleep Patient with Bennett Ezra Goltz, PA-C   [facility] [place] ([facility] [place])    [address] [id number] [phone]              Who to contact     If you have questions or need follow up information about today's clinic visit or your schedule please contact [facility] directly at [phone].  Normal or non-critical lab and imaging results will be communicated to you by MyChart, letter or  "phone within 4 business days after the clinic has received the results. If you do not hear from us within 7 days, please contact the clinic through Adynxx or phone. If you have a critical or abnormal lab result, we will notify you by phone as soon as possible.  Submit refill requests through Adynxx or call your pharmacy and they will forward the refill request to us. Please allow 3 business days for your refill to be completed.          Additional Information About Your Visit        Adynxx Information     Adynxx lets you send messages to your doctor, view your test results, renew your prescriptions, schedule appointments and more. To sign up, go to [email or web address] . Click on \"Log in\" on the left side of the screen, which will take you to the Welcome page. Then click on \"Sign up Now\" on the right side of the page.     You will be asked to enter the access code listed below, as well as some personal information. Please follow the directions to create your username and password.     Your access code is: [email or web address]  Expires: 2017 10:02 AM     Your access code will  in 90 days. If you need help or a new code, please call your [facility] clinic or [phone].        Care EveryWhere ID     This is your Care EveryWhere ID. This could be used by other organizations to access your [facility] medical records  [id number]        Your Vitals Were     Pulse Respirations Height Pulse Oximetry BMI (Body Mass Index)       64 14 1.575 m (5' 2\") 94% 23.59 kg/m2        Blood Pressure from Last 3 Encounters:   10/26/17 118/51   17 97/56   17 112/52    Weight from Last 3 Encounters:   10/26/17 58.5 kg (129 lb)   17 58.5 kg (129 lb)   17 57.6 kg (127 lb)              Today, you had the following     No orders found for display       Primary Care Provider Office Phone # Fax #    Susan Pena PA-C [phone] [address]  [place] [address]        Equal " Access to Services     [facility]: Hadii dl melo javi Lin, waphoenixda luqadaha, qaybta kaalmavaleria pelayo. So [facility] [phone].    ATENCIÓN: Si habla español, tiene a yip disposición servicios gratuitos de asistencia lingüística. Llame al [phone].    We comply with applicable federal civil rights laws and [place] laws. We do not discriminate on the basis of race, color, national origin, age, disability, sex, sexual orientation, or gender identity.            Thank you!     Thank you for choosing [facility]  for your care. Our goal is always to provide you with excellent care. Hearing back from our patients is one way we can continue to improve our services. Please take a few minutes to complete the written survey that you may receive in the mail after your visit with us. Thank you!             Your Updated Medication List - Protect others around you: Learn how to safely use, store and throw away your medicines at www.disposemymeds.org.          This list is accurate as of: 10/26/17 10:32 AM.  Always use your most recent med list.                   Brand Name Dispense Instructions for use Diagnosis    amLODIPine 5 MG tablet    NORVASC    90 tablet    Take 1 tablet (5 mg) by mouth daily        calcium carbonate 1250 MG tablet    OS-ALEXI 500 mg [place]. Ca     Take 500 mg by mouth daily        cholecalciferol 1000 UNITS capsule    vitamin  -D     Take 1 capsule by mouth daily.        fish oil-omega-3 fatty acids 1000 MG capsule      Take 1 capsule by mouth daily.        * labetalol 200 MG tablet    NORMODYNE    90 tablet    TAKE 1 TABLET BY MOUTH 3 TIMES DAILY.        * labetalol 200 MG tablet    NORMODYNE    270 tablet    TAKE 1 TABLET BY MOUTH 3 TIMES A DAY    HTN (hypertension), benign       lactobacillus rhamnosus (GG) 10 B CELL capsule    CULTURELLE     Take 1 capsule by mouth daily.        losartan 50 MG tablet    COZAAR    90 tablet    Take 1  tablet (50 mg) by mouth daily    Essential hypertension, benign       MAGNESIUM OXIDE PO      Take 200 mg by mouth daily Reported on 4/19/2017        polyethylene glycol powder    MIRALAX/GLYCOLAX     Take 1 capful by mouth as needed.        raloxifene 60 MG tablet    Evista    30 tablet    Take 1 tablet (60 mg) by mouth daily    Osteoporosis       scopolamine 72 hr patch    TRANSDERM    2 patch    Place 1 patch onto the skin every 72 hours.  Apply to hairless area behind one ear at least 4 hours before travel.  Remove old patch and change every 3 days.    History of motion sickness       traZODone 50 MG tablet    DESYREL    90 tablet    Take 1 tablet (50 mg) by mouth nightly as needed for sleep    Primary insomnia       TYLENOL PO      Take 500 mg by mouth every 4 hours as needed        VITAMIN C PO      Take 500 mg by mouth daily        zolpidem 5 MG tablet    AMBIEN    30 tablet    Take 0.5 tablets (2.5 mg) by mouth nightly as needed for sleep    Primary insomnia       * Notice:  This list has 2 medication(s) that are the same as other medications prescribed for you. Read the directions carefully, and ask your doctor or other care provider to review them with you.

## 2017-10-27 ENCOUNTER — DOCUMENTATION ONLY (OUTPATIENT)
Dept: SLEEP MEDICINE | Facility: CLINIC | Age: 77
End: 2017-10-27

## 2017-10-27 NOTE — PROGRESS NOTES
STM recheck     Subjective measures:   Pt just saw her provider yesterday.  She is very excited to start implementing new sleep hygiene steps.     Assessment: Pt not meeting objective benchmarks for compliance  Patient meeting subjective benchmarks.   Action plan: pt instructed to call back with additional questions or concerns.  Pt will follow up with provider in 2/3 months per provider request.    Device type: Auto-CPAP  PAP settings: CPAP min 5 cm  H20     CPAP max 15 cm  H20     90th % pressure9.2 cm  H20    Objective measures: 14 day rolling measures         Compliance  42 % (she does not need to meet compliance)      % of night spent in large leak  0 % last  upload      AHI 5.78   last  upload      Average number of minutes 176              Objective measure goal  Compliance   Goal >70%  Leak   Goal < 10%  AHI  Goal < 5  Usage  Goal >240

## 2018-04-30 DIAGNOSIS — I10 BENIGN ESSENTIAL HYPERTENSION: Primary | ICD-10-CM

## 2018-04-30 RX ORDER — LABETALOL 200 MG/1
TABLET, FILM COATED ORAL
Qty: 90 TABLET | Refills: 3 | Status: CANCELLED | OUTPATIENT
Start: 2018-04-30

## 2018-04-30 NOTE — TELEPHONE ENCOUNTER
"Requested Prescriptions   Pending Prescriptions Disp Refills     labetalol (NORMODYNE) 200 MG tablet 90 tablet 3    Last Written Prescription Date:  03/15/18  Last Fill Quantity: 270,  # refills: 0   Last office visit: 4/19/2017 with prescribing provider:  Becky Kyle Office Visit:     Sig: TAKE 1 TABLET BY MOUTH 3 TIMES DAILY.    Beta-Blockers Protocol Failed    4/30/2018 10:35 AM       Failed - Recent (12 mo) or future (30 days) visit within the authorizing provider's specialty    Patient had office visit in the last 12 months or has a visit in the next 30 days with authorizing provider or within the authorizing provider's specialty.  See \"Patient Info\" tab in inbasket, or \"Choose Columns\" in Meds & Orders section of the refill encounter.           Passed - Blood pressure under 140/90 in past 12 months    BP Readings from Last 3 Encounters:   10/26/17 118/51   09/07/17 97/56   08/24/17 112/52                Passed - Patient is age 6 or older        amLODIPine (NORVASC) 5 MG tablet 90 tablet 3    Last Written Prescription Date:  04/19/17  Last Fill Quantity: 90,  # refills: 3   Last office visit: 4/19/2017 with prescribing provider:  Becky Kyle Office Visit:     Sig: Take 1 tablet (5 mg) by mouth daily    Calcium Channel Blockers Protocol  Failed    4/30/2018 10:35 AM       Failed - Recent (12 mo) or future (30 days) visit within the authorizing provider's specialty    Patient had office visit in the last 12 months or has a visit in the next 30 days with authorizing provider or within the authorizing provider's specialty.  See \"Patient Info\" tab in inbasket, or \"Choose Columns\" in Meds & Orders section of the refill encounter.           Failed - Normal serum creatinine on file in past 12 months    Recent Labs   Lab Test  05/10/17   0850   CR  1.10*            Passed - Blood pressure under 140/90 in past 12 months    BP Readings from Last 3 Encounters:   10/26/17 118/51   09/07/17 97/56   08/24/17 112/52          "       Passed - Patient is age 18 or older       Passed - No active pregnancy on record       Passed - No positive pregnancy test in past 12 months

## 2018-05-01 RX ORDER — AMLODIPINE BESYLATE 5 MG/1
5 TABLET ORAL DAILY
Qty: 30 TABLET | Refills: 0 | Status: SHIPPED | OUTPATIENT
Start: 2018-05-01 | End: 2018-05-24

## 2018-05-01 NOTE — TELEPHONE ENCOUNTER
Routing refill request to provider for review/approval because:  Labs out of range:  Creat- last 1.10 on 5/10/17.  Due for annual visit too.  Pended 30 day and addend pharm comments to schedule.  Please authorize if appropriate.  Thanks,  Margie Valencia RN

## 2018-05-04 DIAGNOSIS — I10 HTN (HYPERTENSION), BENIGN: ICD-10-CM

## 2018-05-04 DIAGNOSIS — I10 ESSENTIAL HYPERTENSION, BENIGN: ICD-10-CM

## 2018-05-04 NOTE — TELEPHONE ENCOUNTER
"Last OV 4/19/17  Routing to [facility] to contact patient to inform due for annual exam with PCP. Please route back to refill pool once scheduled.     Izzy RABAGO RN      Requested Prescriptions   Pending Prescriptions Disp Refills     losartan (COZAAR) 50 MG tablet 90 tablet 3     Sig: Take 1 tablet (50 mg) by mouth daily    Angiotensin-II Receptors Failed    5/4/2018 12:49 PM       Failed - Recent (12 mo) or future (30 days) visit within the authorizing provider's specialty    Patient had office visit in the last 12 months or has a visit in the next 30 days with authorizing provider or within the authorizing provider's specialty.  See \"Patient Info\" tab in inbasket, or \"Choose Columns\" in Meds & Orders section of the refill encounter.           Failed - Normal serum creatinine on file in past 12 months    Recent Labs   Lab Test  05/10/17   0850   CR  1.10*            Passed - Blood pressure under 140/90 in past 12 months    BP Readings from Last 3 Encounters:   10/26/17 118/51   09/07/17 97/56   08/24/17 112/52                Passed - Patient is age 18 or older       Passed - No active pregnancy on record       Passed - Normal serum potassium on file in past 12 months    Recent Labs   Lab Test  05/10/17   0850   POTASSIUM  4.5                   Passed - No positive pregnancy test in past 12 months              "

## 2018-05-04 NOTE — TELEPHONE ENCOUNTER
Requested Prescriptions   Pending Prescriptions Disp Refills     losartan (COZAAR) 50 MG tablet 90 tablet 3     Sig: Take 1 tablet (50 mg) by mouth daily    There is no refill protocol information for this order

## 2018-05-08 NOTE — TELEPHONE ENCOUNTER
Second request for losartan  Third request for labetalol - see refill encounter 4/30/18 medication was not addressed.  30 days pended.    RT Tomas (R)

## 2018-05-09 DIAGNOSIS — F51.01 PRIMARY INSOMNIA: ICD-10-CM

## 2018-05-09 DIAGNOSIS — I10 ESSENTIAL HYPERTENSION, BENIGN: ICD-10-CM

## 2018-05-09 RX ORDER — LABETALOL 200 MG/1
TABLET, FILM COATED ORAL
Qty: 30 TABLET | Refills: 0 | Status: SHIPPED | OUTPATIENT
Start: 2018-05-09 | End: 2018-05-24

## 2018-05-09 RX ORDER — LOSARTAN POTASSIUM 50 MG/1
50 TABLET ORAL DAILY
Qty: 90 TABLET | Refills: 0 | Status: SHIPPED | OUTPATIENT
Start: 2018-05-09 | End: 2018-05-24

## 2018-05-09 RX ORDER — LOSARTAN POTASSIUM 50 MG/1
50 TABLET ORAL DAILY
Qty: 30 TABLET | Refills: 0 | Status: SHIPPED | OUTPATIENT
Start: 2018-05-09 | End: 2018-05-09

## 2018-05-09 NOTE — TELEPHONE ENCOUNTER
Next 5 appointments (look out 90 days)     May 24, 2018 11:00 AM CDT   PHYSICAL with Susan Pena PA-C   [facility]    [address] [facility] [address]   [phone]              Medication is being filled for 1 time refill only due to:  Patient needs to be seen because it has been more than one year since last visit.   Izzy RABAGO RN

## 2018-05-09 NOTE — TELEPHONE ENCOUNTER
"losartan (COZAAR) 50 MG tablet 30 tablet 0 5/9/2018     Last Written Prescription Date:  5/9/18  Last Fill Quantity: 30,  # refills: 0   Last office visit: 4/19/2017 with prescribing provider:  Becky   Future Office Visit:  no  Requested Prescriptions   Pending Prescriptions Disp Refills     losartan (COZAAR) 50 MG tablet [Pharmacy Med Name: LOSARTAN 50MG TABLETS] 90 tablet 0     Sig: TAKE 1 TABLET BY MOUTH DAILY    Angiotensin-II Receptors Failed    5/9/2018  9:11 AM       Failed - Recent (12 mo) or future (30 days) visit within the authorizing provider's specialty    Patient had office visit in the last 12 months or has a visit in the next 30 days with authorizing provider or within the authorizing provider's specialty.  See \"Patient Info\" tab in inbasket, or \"Choose Columns\" in Meds & Orders section of the refill encounter.           Failed - Normal serum creatinine on file in past 12 months    Recent Labs   Lab Test  05/10/17   0850   CR  1.10*            Passed - Blood pressure under 140/90 in past 12 months    BP Readings from Last 3 Encounters:   10/26/17 118/51   09/07/17 97/56   08/24/17 112/52                Passed - Patient is age 18 or older       Passed - No active pregnancy on record       Passed - Normal serum potassium on file in past 12 months    Recent Labs   Lab Test  05/10/17   0850   POTASSIUM  4.5                   Passed - No positive pregnancy test in past 12 months        PHQ-9 SCORE 4/23/2014 10/2/2014 12/16/2015   Total Score 4 2 -   Total Score - - 0     "

## 2018-05-09 NOTE — TELEPHONE ENCOUNTER
Medication is being filled for 1 time refill only due to:  Patient needs to be seen because it has been more than one year since last visit.   Letter mailed to patient today.  Tiara Brody RN

## 2018-05-10 DIAGNOSIS — F51.01 PRIMARY INSOMNIA: ICD-10-CM

## 2018-05-10 RX ORDER — TRAZODONE HYDROCHLORIDE 50 MG/1
TABLET, FILM COATED ORAL
OUTPATIENT
Start: 2018-05-10

## 2018-05-10 RX ORDER — TRAZODONE HYDROCHLORIDE 50 MG/1
50 TABLET, FILM COATED ORAL
Qty: 30 TABLET | Refills: 0 | Status: SHIPPED | OUTPATIENT
Start: 2018-05-10 | End: 2018-07-23

## 2018-05-10 NOTE — TELEPHONE ENCOUNTER
"Last Written Prescription Date:  5/10/18  Last Fill Quantity: 30 tablet,  # refills: 0   Last office visit: 4/19/2017 with prescribing provider:  Becky   Future Office Visit:   Next 5 appointments (look out 90 days)     May 24, 2018 11:00 AM CDT   [facility]-C   [facility]    [address] [facility] [id number] [phone]                 Notes to Pharmacy: Further refills to be addressed 5/24/18 office visit    Requested Prescriptions   Pending Prescriptions Disp Refills     traZODone (DESYREL) 50 MG tablet [Pharmacy Med Name: TRAZODONE 50MG TABLETS] 90 tablet 0     Sig: TAKE 1 TABLET BY MOUTH NIGHTLY AS NEEDED FOR SLEEP    Serotonin Modulators Passed    5/10/2018 12:11 PM       Passed - Recent (12 mo) or future (30 days) visit within the authorizing provider's specialty    Patient had office visit in the last 12 months or has a visit in the next 30 days with authorizing provider or within the authorizing provider's specialty.  See \"Patient Info\" tab in inbasket, or \"Choose Columns\" in Meds & Orders section of the refill encounter.           Passed - Patient is age 18 or older       Passed - No active pregnancy on record       Passed - No positive pregnancy test in past 12 months          "

## 2018-05-10 NOTE — TELEPHONE ENCOUNTER
Rx refused. Duplicate request. Pharmacy notified. Further refills at upcoming OV   Izzy RABAGO RN

## 2018-05-10 NOTE — TELEPHONE ENCOUNTER
Prescription approved per [facility] Refill Protocol until appt 5/24/18 with Susan Pena.    Najma Murillo RN

## 2018-05-24 ENCOUNTER — OFFICE VISIT (OUTPATIENT)
Dept: FAMILY MEDICINE | Facility: CLINIC | Age: 78
End: 2018-05-24
Payer: MEDICARE

## 2018-05-24 ENCOUNTER — HOSPITAL ENCOUNTER (OUTPATIENT)
Dept: MAMMOGRAPHY | Facility: CLINIC | Age: 78
Discharge: HOME | End: 2018-05-24
Attending: PHYSICIAN ASSISTANT | Admitting: PHYSICIAN ASSISTANT
Payer: MEDICARE

## 2018-05-24 VITALS
OXYGEN SATURATION: 100 % | BODY MASS INDEX: 23.98 KG/M2 | DIASTOLIC BLOOD PRESSURE: 55 MMHG | HEART RATE: 59 BPM | TEMPERATURE: 99.3 F | WEIGHT: 127 LBS | SYSTOLIC BLOOD PRESSURE: 101 MMHG | HEIGHT: 61 IN

## 2018-05-24 DIAGNOSIS — Z00.00 ENCOUNTER FOR ROUTINE ADULT HEALTH EXAMINATION WITHOUT ABNORMAL FINDINGS: Primary | ICD-10-CM

## 2018-05-24 DIAGNOSIS — Z12.39 BREAST SCREENING: ICD-10-CM

## 2018-05-24 DIAGNOSIS — E78.5 HYPERLIPIDEMIA LDL GOAL <100: ICD-10-CM

## 2018-05-24 DIAGNOSIS — F32.0 MILD MAJOR DEPRESSION (H): ICD-10-CM

## 2018-05-24 DIAGNOSIS — Z86.79 HX OF REPAIR OF DISSECTING THORACIC AORTIC ANEURYSM, STANFORD TYPE A: ICD-10-CM

## 2018-05-24 DIAGNOSIS — M81.0 OSTEOPOROSIS, UNSPECIFIED OSTEOPOROSIS TYPE, UNSPECIFIED PATHOLOGICAL FRACTURE PRESENCE: ICD-10-CM

## 2018-05-24 DIAGNOSIS — N18.30 CKD (CHRONIC KIDNEY DISEASE) STAGE 3, GFR 30-59 ML/MIN (H): ICD-10-CM

## 2018-05-24 DIAGNOSIS — I10 HTN (HYPERTENSION), BENIGN: ICD-10-CM

## 2018-05-24 DIAGNOSIS — Z98.890 HX OF REPAIR OF DISSECTING THORACIC AORTIC ANEURYSM, STANFORD TYPE A: ICD-10-CM

## 2018-05-24 LAB
ERYTHROCYTE [DISTWIDTH] IN BLOOD BY AUTOMATED COUNT: 13.8 % (ref 10–15)
HCT VFR BLD AUTO: 41.5 % (ref 35–47)
HGB BLD-MCNC: 13.6 G/DL (ref 11.7–15.7)
MCH RBC QN AUTO: 31.2 PG (ref 26.5–33)
MCHC RBC AUTO-ENTMCNC: 32.8 G/DL (ref 31.5–36.5)
MCV RBC AUTO: 95 FL (ref 78–100)
PLATELET # BLD AUTO: 230 10E9/L (ref 150–450)
RBC # BLD AUTO: 4.36 10E12/L (ref 3.8–5.2)
WBC # BLD AUTO: 5.4 10E9/L (ref 4–11)

## 2018-05-24 PROCEDURE — 77063 BREAST TOMOSYNTHESIS BI: CPT

## 2018-05-24 PROCEDURE — 80053 COMPREHEN METABOLIC PANEL: CPT | Performed by: PHYSICIAN ASSISTANT

## 2018-05-24 PROCEDURE — G0439 PPPS, SUBSEQ VISIT: HCPCS | Performed by: PHYSICIAN ASSISTANT

## 2018-05-24 PROCEDURE — 80061 LIPID PANEL: CPT | Performed by: PHYSICIAN ASSISTANT

## 2018-05-24 PROCEDURE — 85027 COMPLETE CBC AUTOMATED: CPT | Performed by: PHYSICIAN ASSISTANT

## 2018-05-24 PROCEDURE — 36415 COLL VENOUS BLD VENIPUNCTURE: CPT | Performed by: PHYSICIAN ASSISTANT

## 2018-05-24 PROCEDURE — 82043 UR ALBUMIN QUANTITATIVE: CPT | Performed by: PHYSICIAN ASSISTANT

## 2018-05-24 RX ORDER — LOSARTAN POTASSIUM 50 MG/1
50 TABLET ORAL DAILY
Qty: 90 TABLET | Refills: 3 | Status: SHIPPED | OUTPATIENT
Start: 2018-05-24 | End: 2019-07-30

## 2018-05-24 RX ORDER — AMLODIPINE BESYLATE 5 MG/1
5 TABLET ORAL DAILY
Qty: 90 TABLET | Refills: 3 | Status: SHIPPED | OUTPATIENT
Start: 2018-05-24 | End: 2019-02-04

## 2018-05-24 RX ORDER — RALOXIFENE HYDROCHLORIDE 60 MG/1
60 TABLET, FILM COATED ORAL DAILY
Qty: 90 TABLET | Refills: 3 | Status: SHIPPED | OUTPATIENT
Start: 2018-05-24 | End: 2018-09-12 | Stop reason: ALTCHOICE

## 2018-05-24 RX ORDER — LABETALOL 200 MG/1
TABLET, FILM COATED ORAL
Qty: 90 TABLET | Refills: 11 | Status: SHIPPED | OUTPATIENT
Start: 2018-05-24 | End: 2019-06-19

## 2018-05-24 NOTE — LETTER
[facility]  [address] [facility]  [address]  [place]  [address]  Tel: [phone]    May 29, 2018    Karina HAMPTON Jessica  [address]    [facility] [address]        Dear Ms. Lopez,    It was a pleasure seeing you for your physical examination.  I wanted to get back to you with your test results.  I have enclosed a copy for your review.       I am happy to report that your CBC or complete blood count is normal with no signs of anemia, leukemia or platelet abnormalities. Your chemistry panel shows no signs of diabetes.  Your blood salts, kidney tests, liver tests, and proteins are all fine.  Your bilirubin is chronically elevated and stable.     Your urine microalbumin was normal.     Your total cholesterol is 222 with the normal range being below 200.  Your HDL or good cholesterol is 82 with the normal range being above 50.  Your LDL or bad cholesterol is 125 with the normal range being below 130.  This did go up this year compared to last year so please follow a low fat/low cholesterol diet.     Please let me know if you have any questions.     Susan Pena PA-C/isidra     Results for orders placed or performed in visit on 05/24/18   Comprehensive metabolic panel   Result Value Ref Range    Sodium 138 133 - 144 mmol/L    Potassium 4.6 3.4 - 5.3 mmol/L    Chloride 103 94 - 109 mmol/L    Carbon Dioxide 27 20 - 32 mmol/L    Anion Gap 8 3 - 14 mmol/L    Glucose 85 70 - 99 mg/dL    Urea Nitrogen 18 7 - 30 mg/dL    Creatinine 0.98 0.52 - 1.04 mg/dL    GFR Estimate 56 (L) >60 mL/min/1.7m2    GFR Estimate If Black 67 >60 mL/min/1.7m2    Calcium 9.0 8.5 - 10.1 mg/dL    Bilirubin Total 1.6 (H) 0.2 - 1.3 mg/dL    Albumin 4.1 3.4 - 5.0 g/dL    Protein Total 7.4 6.8 - 8.8 g/dL    Alkaline Phosphatase 65 40 - 150 U/L    ALT 24 0 - 50 U/L    AST 18 0 - 45 U/L   CBC with platelets   Result Value Ref Range    WBC 5.4 4.0 - 11.0 10e9/L    RBC Count 4.36 3.8 - 5.2 10e12/L    Hemoglobin 13.6 11.7 - 15.7 g/dL     Hematocrit 41.5 35.0 - 47.0 %    MCV 95 78 - 100 fl    MCH 31.2 26.5 - 33.0 pg    MCHC 32.8 31.5 - 36.5 g/dL    RDW 13.8 10.0 - 15.0 %    Platelet Count 230 150 - 450 10e9/L   Lipid Profile   Result Value Ref Range    Cholesterol 222 (H) <200 mg/dL    Triglycerides 74 <150 mg/dL    HDL Cholesterol 82 >49 mg/dL    LDL Cholesterol Calculated 125 (H) <100 mg/dL    Non HDL Cholesterol 140 (H) <130 mg/dL   Albumin Random Urine Quantitative with Creat Ratio   Result Value Ref Range    Creatinine Urine 126 mg/dL    Albumin Urine mg/L 6 mg/L    Albumin Urine mg/g Cr 5.08 0 - 25 mg/g Cr           Enclosure: Lab Results

## 2018-05-24 NOTE — MR AVS SNAPSHOT
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   o Discuss preventive care.    o Review your medicines if your doctor has prescribed any.      You no longer need a yearly Pap test unless you've had an abnormal Pap test in the past 10 years. If you have vaginal symptoms, such as bleeding or discharge, be sure to talk with your provider about a Pap test.      Every 1 to 2 years, have a mammogram.  If you are over 69, talk with your health care provider about whether or not you want to continue having screening mammograms.      Every 10 years, have a colonoscopy. Or, have a yearly FIT test (stool test). These exams will check for colon cancer.       Have a cholesterol test every 5 years, or more often if your doctor advises it.       Have a diabetes test (fasting glucose) every three years. If you are at risk for diabetes, you should have this test more often.       At age 65, have a bone density scan (DEXA) to check for osteoporosis (brittle bone disease).    Shots:    Get a flu shot each year.    Get a  tetanus shot every 10 years.    Talk to your doctor about your pneumonia vaccines. There are now two you should receive - Pneumovax (PPSV 23) and Prevnar (PCV 13).    Talk to your doctor about the shingles vaccine.    Talk to your doctor about the hepatitis B vaccine.    Nutrition:     Eat at least 5 servings of fruits and vegetables each day.      Eat whole-grain bread, whole-wheat pasta and brown rice instead of white grains and rice.      Talk to your provider about Calcium and Vitamin D.     Lifestyle    Exercise at least 150 minutes a week (30 minutes a day, 5 days a week). This will help you control your weight and prevent disease.      Limit alcohol to one drink per day.      No smoking.       Wear sunscreen to prevent skin cancer.       See your dentist twice a year for an exam and cleaning.      See your eye doctor every 1 to 2 years to screen for conditions such as glaucoma, macular degeneration and cataracts.    [address] for the mammogram  There is a new shingles shot called Shingrex, 2 shot series for shingles, check insurance and if covered get this at the [facility] pharmacy          Follow-ups after your visit        Your next 10 appointments already scheduled     Sep 12, 2018  9:45 AM CDT   Return Visit with Kenn Jean-Baptiste MD   [facility] ([facility])    [address] [place] [address] [address]  [facility] [address] [phone] OPT 2              Who to contact     If you have questions or need follow up information about today's clinic visit or your schedule please contact [facility] directly at [phone].  Normal or non-critical lab and imaging results will be communicated to you by MyChart, letter or phone within 4 business days after the clinic has received the results. If you do not hear from us within 7 days, please contact the clinic through MyChart or phone. If you have a critical or abnormal lab result, we will notify you by phone as  "soon as possible.  Submit refill requests through [employer] or call your pharmacy and they will forward the refill request to us. Please allow 3 business days for your refill to be completed.          Additional Information About Your Visit        [facility] [employer] Information     [employer] lets you send messages to your doctor, view your test results, renew your prescriptions, schedule appointments and more. To sign up, go to [email or web address] . Click on \"Log in\" on the left side of the screen, which will take you to the Welcome page. Then click on \"Sign up Now\" on the right side of the page.     You will be asked to enter the access code listed below, as well as some personal information. Please follow the directions to create your username and password.     Your access code is: [email or web address]  Expires: 2018 10:57 AM     Your access code will  in 90 days. If you need help or a new code, please call your [place] clinic or [phone].        Care EveryWhere ID     This is your Care EveryWhere ID. This could be used by other organizations to access your [place] medical records  [id number]        Your Vitals Were     Pulse Temperature Height Pulse Oximetry Breastfeeding? BMI (Body Mass Index)    59 99.3  F (37.4  C) (Tympanic) 5' 0.75\" (1.543 m) 100% No 24.19 kg/m2       Blood Pressure from Last 3 Encounters:   18 101/55   10/26/17 118/51   17 97/56    Weight from Last 3 Encounters:   18 127 lb (57.6 kg)   10/26/17 129 lb (58.5 kg)   17 129 lb (58.5 kg)              We Performed the Following     Albumin Random Urine Quantitative with Creat Ratio     CBC with platelets     Comprehensive metabolic panel     Lipid Profile          Where to get your medicines      These medications were sent to [facility] [id number] AT [facility] [address] [address]     Phone:  [phone]     amLODIPine 5 MG tablet    labetalol 200 " MG tablet    losartan 50 MG tablet    raloxifene 60 MG tablet          Primary Care Provider Office Phone # Fax #    Susan Pena PA-C [phone] [address] [facility] [address]  [facility] [address]        Equal Access to Services     ANDREA BLACK : Hadii aad ku hadasho Soomaali, waaxda luqadaha, qaybta kaalmada adeegyada, waxay bradenin hayaan adenavid casanova jose . So [facility] [phone].    ATENCIÓN: Si habla español, tiene a yip disposición servicios gratuitos de asistencia lingüística. Llame al [phone].    We comply with applicable federal civil rights laws and [place] laws. We do not discriminate on the basis of race, color, national origin, age, disability, sex, sexual orientation, or gender identity.            Thank you!     Thank you for choosing [facility]  for your care. Our goal is always to provide you with excellent care. Hearing back from our patients is one way we can continue to improve our services. Please take a few minutes to complete the written survey that you may receive in the mail after your visit with us. Thank you!             Your Updated Medication List - Protect others around you: Learn how to safely use, store and throw away your medicines at www.disposemymeds.org.          This list is accurate as of 5/24/18 11:28 AM.  Always use your most recent med list.                   Brand Name Dispense Instructions for use Diagnosis    amLODIPine 5 MG tablet    NORVASC    90 tablet    Take 1 tablet (5 mg) by mouth daily    Benign essential hypertension       calcium carbonate 500 MG tablet    OS-ALEXI 500 mg [place]. Ca     Take 500 mg by mouth daily        cholecalciferol 1000 units capsule    vitamin  -D     Take 1 capsule by mouth daily.        fish oil-omega-3 fatty acids 1000 MG capsule      Take 1 capsule by mouth daily.        labetalol 200 MG tablet    NORMODYNE    90 tablet    TAKE 1 TABLET BY MOUTH 3 TIMES A DAY    HTN (hypertension), benign        lactobacillus rhamnosus (GG) 10 B CELL capsule    CULTURELLE     Take 1 capsule by mouth daily.        losartan 50 MG tablet    COZAAR    90 tablet    Take 1 tablet (50 mg) by mouth daily    Essential hypertension, benign       MAGNESIUM OXIDE PO      Take 200 mg by mouth daily Reported on 4/19/2017        polyethylene glycol powder    MIRALAX/GLYCOLAX     Take 1 capful by mouth as needed.        raloxifene 60 MG tablet    Evista    90 tablet    Take 1 tablet (60 mg) by mouth daily    Osteoporosis, unspecified osteoporosis type, unspecified pathological fracture presence       scopolamine 72 hr patch    TRANSDERM    2 patch    Place 1 patch onto the skin every 72 hours.  Apply to hairless area behind one ear at least 4 hours before travel.  Remove old patch and change every 3 days.    History of motion sickness       traZODone 50 MG tablet    DESYREL    30 tablet    Take 1 tablet (50 mg) by mouth nightly as needed for sleep    Primary insomnia       TYLENOL PO      Take 500 mg by mouth every 4 hours as needed        VITAMIN C PO      Take 500 mg by mouth daily        zolpidem 5 MG tablet    AMBIEN    30 tablet    Take 0.5 tablets (2.5 mg) by mouth nightly as needed for sleep    Primary insomnia

## 2018-05-24 NOTE — PROGRESS NOTES
SUBJECTIVE:   Karina Lopez is a 77 year old female who presents for Preventive Visit.    She hasn't been exercising as much  Felt depressed in April due to the weather  She is feeling more motivated to start walking again  She went to [place] and [place] for the winter  No updates  Has occas mild pain in RLQ off and for months, occurs very infreq, had it yesterday. 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  No    Healthy Habits:    Do you get at least three servings of calcium containing foods daily (dairy, green leafy vegetables, etc.)? yes    Amount of exercise or daily activities, outside of work: occasional - nothing regularly day(s) per week    Problems taking medications regularly No    Medication side effects: No    Have you had an eye exam in the past two years? yes    Do you see a dentist twice per year? No - once yearly    Do you have sleep apnea, excessive snoring or daytime drowsiness?yes uses CPAP      Ability to successfully perform activities of daily living: Yes, no assistance needed    Home safety:  none identified     Hearing impairment: No    Fall risk:         COGNITIVE SCREEN  1) Repeat 3 items (Banana, Sunrise, Chair)    2) Clock draw: ABNORMAL Normal clock draw, unable to give correct time of 11:10  3) 3 item recall: Recalls 3 objects  Results: 3 items recalled: COGNITIVE IMPAIRMENT LESS LIKELY    Mini-CogTM Copyright S Patrice. Licensed by the author for use in [facility]; reprinted with permission ([email or web address]@.[email or web address]). All rights reserved.       Social History   Substance Use Topics     Smoking status: Former Smoker     Start date: 1/1/1974     Smokeless tobacco: Never Used      Comment: light smoker until 1974     Alcohol use 1.8 oz/week     3 Glasses of wine per week      Comment: 2 glasses of wine with dinner about 3 times per week       If you drink  alcohol do you typically have >3 drinks per day or >7 drinks per week? No                        Today's PHQ-2 Score:   PHQ-2 ( 1999 [employer]) 4/19/2017 12/16/2015   Q1: Little interest or pleasure in doing things 0 0   Q2: Feeling down, depressed or hopeless 0 0   PHQ-2 Score 0 0       Do you feel safe in your environment - Yes    Do you have a Health Care Directive?: Yes: Advance Directive has been received and scanned.    Current providers sharing in care for this patient include:   Patient Care Team:  Susan Pena PA-C as PCP - General (Internal Medicine)  Ricky Puri MD as MD (Cardiology)    The following health maintenance items are reviewed in Epic and correct as of today:  Health Maintenance   Topic Date Due     PHQ-9 Q6 MONTHS  06/16/2016     ADVANCE DIRECTIVE PLANNING Q5 YRS  08/14/2017     FALL RISK ASSESSMENT  04/19/2018     INFLUENZA VACCINE (Season Ended) 09/01/2018     TETANUS IMMUNIZATION (SYSTEM ASSIGNED)  10/20/2018     DEPRESSION ACTION PLAN Q1 YR  05/24/2019     LIPID SCREEN Q5 YR FEMALE (SYSTEM ASSIGNED)  05/10/2022     COLON CANCER SCREEN (SYSTEM ASSIGNED)  02/20/2024     DEXA SCAN SCREENING (SYSTEM ASSIGNED)  Completed     PNEUMOCOCCAL  Completed     Past Medical History:   Diagnosis Date     Anemia      Best vitelliform macular dystrophy      HTN (hypertension), benign      Hx of repair of dissecting thoracic aortic aneurysm, [place]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     +fibromuscular dysplasia     Hyperlipidaemia      Hyperlipidemia LDL goal < 130      Insomnia      Osteoporosis, postmenopausal      Past Surgical History:   Procedure Laterality Date     CARDIAC SURGERY       HC OPEN TX METATARSAL FRACTURE       S/p thoracic aortic aneurysm repair  10/21/12     VASCULAR SURGERY       Current Outpatient Prescriptions   Medication Sig Dispense Refill     Acetaminophen (TYLENOL PO) Take 500 mg by mouth every 4 hours as needed       amLODIPine (NORVASC) 5 MG tablet Take 1 tablet (5 mg) by mouth daily 90  tablet 3     Ascorbic Acid (VITAMIN C PO) Take 500 mg by mouth daily       calcium carbonate (OS-ALEXI 500 MG Yuhaaviatam. CA) 500 MG tablet Take 500 mg by mouth daily       cholecalciferol (VITAMIN D3) 1000 UNITS capsule Take 1 capsule by mouth daily.       fish oil-omega-3 fatty acids (FISH OIL) 1000 MG capsule Take 1 capsule by mouth daily.       labetalol (NORMODYNE) 200 MG tablet TAKE 1 TABLET BY MOUTH 3 TIMES A DAY 90 tablet 11     lactobacillus rhamnosus, GG, (CULTURELLE) 10 B CELL capsule Take 1 capsule by mouth daily.       losartan (COZAAR) 50 MG tablet Take 1 tablet (50 mg) by mouth daily 90 tablet 3     MAGNESIUM OXIDE PO Take 200 mg by mouth daily Reported on 4/19/2017       polyethylene glycol (MIRALAX/GLYCOLAX) powder Take 1 capful by mouth as needed.       raloxifene (EVISTA) 60 MG tablet Take 1 tablet (60 mg) by mouth daily 90 tablet 3     scopolamine (TRANSDERM) (1.5mg base/3day) patch Place 1 patch onto the skin every 72 hours.  Apply to hairless area behind one ear at least 4 hours before travel.  Remove old patch and change every 3 days. 2 patch 1     traZODone (DESYREL) 50 MG tablet Take 1 tablet (50 mg) by mouth nightly as needed for sleep 30 tablet 0     zolpidem (AMBIEN) 5 MG tablet Take 0.5 tablets (2.5 mg) by mouth nightly as needed for sleep 30 tablet 5     [DISCONTINUED] amLODIPine (NORVASC) 5 MG tablet Take 1 tablet (5 mg) by mouth daily 30 tablet 0     [DISCONTINUED] labetalol (NORMODYNE) 200 MG tablet TAKE 1 TABLET BY MOUTH 3 TIMES A DAY 30 tablet 0     [DISCONTINUED] losartan (COZAAR) 50 MG tablet Take 1 tablet (50 mg) by mouth daily 90 tablet 0     [DISCONTINUED] raloxifene (EVISTA) 60 MG tablet Take 1 tablet (60 mg) by mouth daily (Patient not taking: Reported on 5/24/2018) 30 tablet 11       ROS:  Constitutional, HEENT, cardiovascular, pulmonary, GI, , musculoskeletal, neuro, skin, endocrine and psych systems are negative, except as otherwise noted.    OBJECTIVE:   /55 (BP  "Location: Right arm, Cuff Size: Adult Regular)  Pulse 59  Temp 99.3  F (37.4  C) (Tympanic)  Ht 5' 0.75\" (1.543 m)  Wt 127 lb (57.6 kg)  SpO2 100%  Breastfeeding? No  BMI 24.19 kg/m2 Estimated body mass index is 24.19 kg/(m^2) as calculated from the following:    Height as of this encounter: 5' 0.75\" (1.543 m).    Weight as of this encounter: 127 lb (57.6 kg).  EXAM:   GENERAL APPEARANCE: healthy, alert and no distress  EYES: Eyes grossly normal to inspection, PERRL and conjunctivae and sclerae normal  HENT: ear canals and TM's normal, nose and mouth without ulcers or lesions, oropharynx clear and oral mucous membranes moist  NECK: +lipoma neck, no adenopathy, no asymmetry, masses, or scars and thyroid normal to palpation  RESP: lungs clear to auscultation - no rales, rhonchi or wheezes  BREAST: normal without masses, tenderness or nipple discharge and no palpable axillary masses or adenopathy  CV: regular rate and rhythm, normal S1 S2, no S3 or S4, no murmur, click or rub, no peripheral edema and peripheral pulses strong  ABDOMEN: soft, nontender, no hepatosplenomegaly, no masses and bowel sounds normal  MS: no musculoskeletal defects are noted and gait is age appropriate without ataxia  SKIN: no suspicious lesions or rashes  NEURO: Normal strength and tone, sensory exam grossly normal, mentation intact and speech normal  PSYCH: mentation appears normal and affect normal/bright    ASSESSMENT / PLAN:   Assessment and Plan:     (Z00.00) Encounter for routine adult health examination without abnormal findings  (primary encounter diagnosis)  Comment: fasting labs today.   Plan: mammogram due so recd she go today.  Discussed shingrex.    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 Jean-Baptiste in Sept    (F32.0) Mild major depression (H)  Comment: better now that weather improved  Plan: DAP    (N18.3) CKD (chronic kidney disease) stage 3, GFR 30-59 ml/min  Comment:   Plan: Comprehensive metabolic panel, Albumin Random         Urine Quantitative with Creat Ratio            (E78.5) Hyperlipidemia LDL goal <100  Comment:   Plan: Lipid Profile            (M81.0) Osteoporosis, unspecified osteoporosis type, unspecified pathological fracture presence  Comment: she only took evista for a month due to cost. No side effects. Has new insurance so will see what the cost is now  Plan: raloxifene (EVISTA) 60 MG tablet                End of Life Planning:  Patient currently has an advanced directive: Yes.  Practitioner is supportive of decision.    COUNSELING:  Reviewed preventive health counseling, as reflected in patient instructions        Estimated body mass index is 24.19 kg/(m^2) as calculated from the following:    Height as of this encounter: 5' 0.75\" (1.543 m).    Weight as of this encounter: 127 lb (57.6 kg).       reports that she has quit smoking. She started smoking about 44 years ago. She has never used smokeless tobacco.      Appropriate preventive services were discussed with this patient, including applicable screening as appropriate for cardiovascular disease, diabetes, osteopenia/osteoporosis, and glaucoma.  As appropriate for age/gender, discussed screening for colorectal cancer, prostate cancer, breast cancer, and cervical cancer. Checklist reviewing preventive services available has been given to the patient.    Reviewed patients plan of care and provided an AVS. The Basic Care Plan (routine screening as documented in Health Maintenance) for Karina meets the Care Plan requirement. This Care Plan has been established and reviewed with the Patient.    Counseling Resources:  ATP IV Guidelines  Pooled Cohorts Equation Calculator  Breast Cancer Risk Calculator  FRAX Risk " Assessment  ICSI Preventive Guidelines  Dietary Guidelines for Americans, 2010  USDA's MyPlate  ASA Prophylaxis  Lung CA Screening    Susan Pena PA-C  [facility]

## 2018-05-24 NOTE — PATIENT INSTRUCTIONS

## 2018-05-25 LAB
ALBUMIN SERPL-MCNC: 4.1 G/DL (ref 3.4–5)
ALP SERPL-CCNC: 65 U/L (ref 40–150)
ALT SERPL W P-5'-P-CCNC: 24 U/L (ref 0–50)
ANION GAP SERPL CALCULATED.3IONS-SCNC: 8 MMOL/L (ref 3–14)
AST SERPL W P-5'-P-CCNC: 18 U/L (ref 0–45)
BILIRUB SERPL-MCNC: 1.6 MG/DL (ref 0.2–1.3)
BUN SERPL-MCNC: 18 MG/DL (ref 7–30)
CALCIUM SERPL-MCNC: 9 MG/DL (ref 8.5–10.1)
CHLORIDE SERPL-SCNC: 103 MMOL/L (ref 94–109)
CHOLEST SERPL-MCNC: 222 MG/DL
CO2 SERPL-SCNC: 27 MMOL/L (ref 20–32)
CREAT SERPL-MCNC: 0.98 MG/DL (ref 0.52–1.04)
CREAT UR-MCNC: 126 MG/DL
GFR SERPL CREATININE-BSD FRML MDRD: 56 ML/MIN/1.7M2
GLUCOSE SERPL-MCNC: 85 MG/DL (ref 70–99)
HDLC SERPL-MCNC: 82 MG/DL
LDLC SERPL CALC-MCNC: 125 MG/DL
MICROALBUMIN UR-MCNC: 6 MG/L
MICROALBUMIN/CREAT UR: 5.08 MG/G CR (ref 0–25)
NONHDLC SERPL-MCNC: 140 MG/DL
POTASSIUM SERPL-SCNC: 4.6 MMOL/L (ref 3.4–5.3)
PROT SERPL-MCNC: 7.4 G/DL (ref 6.8–8.8)
SODIUM SERPL-SCNC: 138 MMOL/L (ref 133–144)
TRIGL SERPL-MCNC: 74 MG/DL

## 2018-05-29 NOTE — PROGRESS NOTES
It was a pleasure seeing you for your physical examination.  I wanted to get back to you with your test results.  I have enclosed a copy for your review.      I am happy to report that your CBC or complete blood count is normal with no signs of anemia, leukemia or platelet abnormalities. Your chemistry panel shows no signs of diabetes.  Your blood salts, kidney tests, liver tests, and proteins are all fine.  Your bilirubin is chronically elevated and stable.    Your urine microalbumin was normal.    Your total cholesterol is 222 with the normal range being below 200.  Your HDL or good cholesterol is 82 with the normal range being above 50.  Your LDL or bad cholesterol is 125 with the normal range being below 130.  This did go up this year compared to last year so please follow a low fat/low cholesterol diet.    Please let me know if you have any questions.    Susan Pena PA-C

## 2018-07-23 DIAGNOSIS — F51.01 PRIMARY INSOMNIA: ICD-10-CM

## 2018-07-24 RX ORDER — TRAZODONE HYDROCHLORIDE 50 MG/1
50 TABLET, FILM COATED ORAL
Qty: 30 TABLET | Refills: 0 | Status: SHIPPED | OUTPATIENT
Start: 2018-07-24 | End: 2018-08-25

## 2018-07-24 NOTE — TELEPHONE ENCOUNTER
"Last Written Prescription Date:  5/10/18  Last Fill Quantity: 30 tablet,  # refills: 0   Last office visit: 5/24/2018 with prescribing provider:  Becky   Future Office Visit:   Next 5 appointments (look out 90 days)     Sep 12, 2018  9:45 AM CDT   Return Visit with Kenn Jean-Baptiste MD   [facility] ([facility])    [address] [place] [id number] [phone] OPT 2                 Notes to Pharmacy: Further refills to be addressed 5/24/18 office visit.    Requested Prescriptions   Pending Prescriptions Disp Refills     traZODone (DESYREL) 50 MG tablet 30 tablet 0     Sig: Take 1 tablet (50 mg) by mouth nightly as needed for sleep    Serotonin Modulators Passed    7/23/2018  3:36 PM       Passed - Recent (12 mo) or future (30 days) visit within the authorizing provider's specialty    Patient had office visit in the last 12 months or has a visit in the next 30 days with authorizing provider or within the authorizing provider's specialty.  See \"Patient Info\" tab in inbasket, or \"Choose Columns\" in Meds & Orders section of the refill encounter.           Passed - Patient is age 18 or older       Passed - No active pregnancy on record       Passed - No positive pregnancy test in past 12 months          "

## 2018-07-24 NOTE — TELEPHONE ENCOUNTER
AST   Date Value Ref Range Status   05/24/2018 18 0 - 45 U/L Final     Lab Results   Component Value Date    ALT 24 05/24/2018     Prescription approved per [facility] Refill Protocol.  Caterina Vera RN

## 2018-08-02 DIAGNOSIS — I10 BENIGN ESSENTIAL HYPERTENSION: ICD-10-CM

## 2018-08-03 RX ORDER — AMLODIPINE BESYLATE 5 MG/1
TABLET ORAL
Start: 2018-08-03

## 2018-08-03 NOTE — TELEPHONE ENCOUNTER
"Amlodipine 5 mg    Last Written Prescription Date:  05/24/18  Last Fill Quantity: 90 tablets,  # refills: 3   Last office visit: 5/24/2018 with prescribing provider:  Susan Pena   Future Office Visit:   Next 5 appointments (look out 90 days)     Sep 12, 2018  9:45 AM CDT   Return Visit with Kenn Jean-Baptiste MD   [facility] ([facility])    [address] [place] [id number] [phone] OPT 2                 Requested Prescriptions   Pending Prescriptions Disp Refills     amLODIPine (NORVASC) 5 MG tablet [Pharmacy Med Name: AMLODIPINE BESYLATE 5MG TABLETS] 30 tablet 0     Sig: TAKE 1 TABLET BY MOUTH DAILY.    Calcium Channel Blockers Protocol  Passed    8/2/2018  5:49 PM       Passed - Blood pressure under 140/90 in past 12 months    BP Readings from Last 3 Encounters:   05/24/18 101/55   10/26/17 118/51   09/07/17 97/56                Passed - Recent (12 mo) or future (30 days) visit within the authorizing provider's specialty    Patient had office visit in the last 12 months or has a visit in the next 30 days with authorizing provider or within the authorizing provider's specialty.  See \"Patient Info\" tab in inbasket, or \"Choose Columns\" in Meds & Orders section of the refill encounter.           Passed - Patient is age 18 or older       Passed - No active pregnancy on record       Passed - Normal serum creatinine on file in past 12 months    Recent Labs   Lab Test  05/24/18   1157   CR  0.98            Passed - No positive pregnancy test in past 12 months          "

## 2018-09-12 ENCOUNTER — OFFICE VISIT (OUTPATIENT)
Dept: CARDIOLOGY | Facility: CLINIC | Age: 78
End: 2018-09-12
Attending: INTERNAL MEDICINE
Payer: MEDICARE

## 2018-09-12 VITALS
DIASTOLIC BLOOD PRESSURE: 75 MMHG | SYSTOLIC BLOOD PRESSURE: 130 MMHG | WEIGHT: 127 LBS | HEIGHT: 61 IN | BODY MASS INDEX: 23.98 KG/M2 | HEART RATE: 60 BPM

## 2018-09-12 DIAGNOSIS — I35.9 AORTIC VALVE DISORDER: ICD-10-CM

## 2018-09-12 DIAGNOSIS — E78.5 HYPERLIPIDEMIA LDL GOAL <100: ICD-10-CM

## 2018-09-12 DIAGNOSIS — I10 HTN (HYPERTENSION), BENIGN: ICD-10-CM

## 2018-09-12 DIAGNOSIS — Z98.890 S/P AORTIC DISSECTION REPAIR: Primary | ICD-10-CM

## 2018-09-12 PROCEDURE — 99214 OFFICE O/P EST MOD 30 MIN: CPT | Performed by: INTERNAL MEDICINE

## 2018-09-12 NOTE — LETTER
9/12/2018    Susan Pena, SHEILA  [address]  [facility] [address]    RE: Karina HAMPTON Jessica       Dear Colleague,    I had the pleasure of seeing Karina Lopez in the [facility].    REASON FOR VISIT:  Followup for type A aortic dissection, status post repair.      PRIMARY CARE PHYSICIAN:  Susan Pena.      HISTORY OF PRESENT ILLNESS:  I again had the pleasure of seeing Karina Lopez at the [facility] in [place] this afternoon.  She is a very pleasant 77-year-old female with history of type A aortic dissection status post repair, hyperlipidemia and hypertension.      In 10/2012 the patient developed acute, tearing chest pain while flying to [place], [place].  At the time, she was working as a .  She was rushed to a local hospital, where she was diagnosed with acute type A aortic dissection.  She underwent emergent repair of the ascending aorta and resuspension of the aortic valve.  Postoperatively, she developed acute renal failure which resolved conservatively.  The aortic dissection extended down to the left external iliac artery.  Luckily, she did not have any blood flow compromised to her visceral organs or spinal cord.  All of the blood vessels to her visceral organs came off the true lumen.  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  Subsequent imaging demonstrated atrophic left kidney with mild perfusion abnormality.  She was seen by Nephrology, and they did not recommend an intervention with regard to the left renal artery stenosis.      Over the years, she has had multiple CTAs.  The most recent CTA performed in June of last year showed patent aortic arch vessels as well as interval complete resolution of the false lumen within the thoracic aorta.  The left renal artery appears still pinched by the false lumen.  Overall, the CTA findings remain  stable.      She has done fairly well over the last year.  She has not had any symptoms including back pain or abdominal pain.  She has some shoulder discomfort las year.  She was able to discontinue simvastain.  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      IMPRESSION, REPORT AND PLAN:   1.  History of type A aortic dissection, status post repair with resuspension of the aortic valve.   2.  Moderate aortic valve regurgitation.   3.  Hypertension.   4.  Hyperlipidemia.        Ms. Lopez continues to do well from a cardiovascular standpoint.  Her last CTA showed stable findings.  She currently does not endorse any symptoms.  Her blood pressure is well controlled with the current medical program. We will obtain CTA of the chest, abdomen and pelvis. We will contact her once the results of CT become available.      We will see her next year for followup.  In the interim, if she develops any symptoms, she will communicate with us.      I appreciate the opportunity to be part of this patient's care.          RAMAKRISHNA DIAZ MD         D: 2016 15:38   T: 2016 22:51   MT: XU      Name:     KELI LOPEZ   MRN:      [id number]        Account:      [id number]   :      1940           Service Date: 2016      Document: [id number]      Orders Placed This Encounter   Procedures     US Thyroid     SLEEP EVALUATION & MANAGEMENT REFERRAL - ADULT     Follow-Up with Cardiologist     Echocardiogram       No orders of the defined types were placed in this encounter.      Medications Discontinued During This Encounter   Medication Reason     labetalol (NORMODYNE) 200 MG tablet Erroneous Entry     rosuvastatin (CRESTOR) 5 MG tablet          Encounter Diagnoses   Name Primary?     S/P aortic dissection repair Yes     Nonrheumatic aortic valve insufficiency      Other fatigue      Aortic valve disorder      Lump in neck        CURRENT MEDICATIONS:  Current  Outpatient Prescriptions   Medication Sig Dispense Refill     zolpidem (AMBIEN) 5 MG tablet Take 0.5 tablets (2.5 mg) by mouth nightly as needed for sleep 30 tablet 5     losartan (COZAAR) 50 MG tablet Take 1 tablet (50 mg) by mouth daily 90 tablet 3     traZODone (DESYREL) 50 MG tablet Take 1 tablet (50 mg) by mouth nightly as needed for sleep 90 tablet 3     labetalol (NORMODYNE) 200 MG tablet TAKE 1 TABLET BY MOUTH 3 TIMES DAILY. 90 tablet 3     amLODIPine (NORVASC) 5 MG tablet Take 1 tablet (5 mg) by mouth daily 90 tablet 3     Acetaminophen (TYLENOL PO) Take 500 mg by mouth every 4 hours as needed       Ascorbic Acid (VITAMIN C PO) Take 500 mg by mouth daily       calcium carbonate (OS-ALEXI 500 MG [place]. CA) 500 MG tablet Take 500 mg by mouth daily       MAGNESIUM OXIDE PO Take 200 mg by mouth daily Reported on 4/19/2017       fish oil-omega-3 fatty acids (FISH OIL) 1000 MG capsule Take 1 capsule by mouth daily.       lactobacillus rhamnosus, GG, (CULTURELLE) 10 B CELL capsule Take 1 capsule by mouth daily.       cholecalciferol (VITAMIN D3) 1000 UNITS capsule Take 1 capsule by mouth daily.       polyethylene glycol (MIRALAX/GLYCOLAX) powder Take 1 capful by mouth as needed.       [DISCONTINUED] labetalol (NORMODYNE) 200 MG tablet Take 1 tablet (200 mg) by mouth 3 times daily 90 tablet 3     raloxifene (EVISTA) 60 MG tablet Take 1 tablet (60 mg) by mouth daily (Patient not taking: Reported on 8/24/2017) 30 tablet 11     scopolamine (TRANSDERM) (1.5mg base/3day) patch Place 1 patch onto the skin every 72 hours.  Apply to hairless area behind one ear at least 4 hours before travel.  Remove old patch and change every 3 days. (Patient not taking: Reported on 8/24/2017) 2 patch 1       ALLERGIES     Allergies   Allergen Reactions     Lisinopril      Cough       Simvastatin      myalgias       PAST MEDICAL HISTORY:  Past Medical History:   Diagnosis Date     Anemia      Best vitelliform macular dystrophy      HTN  "(hypertension), benign      Hx of repair of dissecting thoracic aortic aneurysm, Vasile type A     +fibromuscular dysplasia     Hyperlipidaemia      Hyperlipidemia LDL goal < 130      Insomnia      Osteoporosis, postmenopausal        PAST SURGICAL HISTORY:  Past Surgical History:   Procedure Laterality Date     CARDIAC SURGERY       HC OPEN TX METATARSAL FRACTURE       S/p thoracic aortic aneurysm repair  10/21/12     VASCULAR SURGERY         FAMILY HISTORY:  Family History   Problem Relation Age of Onset     Hypertension Father      best syndrome     CANCER Father      lung     Hypertension Paternal Grandmother      stroke     Alcohol/Drug Brother      best syndr       SOCIAL HISTORY:  Social History     Social History     Marital status: Single     Spouse name: N/A     Number of children: N/A     Years of education: N/A     Social History Main Topics     Smoking status: Former Smoker     Start date: 1/1/1974     Smokeless tobacco: Never Used      Comment: light smoker until 1974     Alcohol use 1.8 oz/week     3 Glasses of wine per week      Comment: 3 to 5 drinks per week     Drug use: No     Sexual activity: Yes     Partners: Male     Other Topics Concern     Parent/Sibling W/ Cabg, Mi Or Angioplasty Before 65f 55m? 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       Review of Systems:  Skin:  Negative       Eyes:  Positive for glasses    ENT:  Negative      Respiratory:  Negative       Cardiovascular:    fatigue;Positive for;lightheadedness;dizziness    Gastroenterology: Negative      Genitourinary:  Negative      Musculoskeletal:  Negative      Neurologic:  Negative      Psychiatric:  Negative      Heme/Lymph/Imm:  Negative      Endocrine:  Negative        Physical Exam:  Vitals: /52  Pulse 51  Ht 1.549 m (5' 0.98\")  Wt 57.6 kg (127 lb)  BMI 24.01 " kg/m2    Constitutional:  in no acute distress        Skin:  warm and dry to the touch, no apparent skin lesions or masses noted        Head:  normocephalic;no masses or lesions        Eyes:           ENT:  dentition good;no pallor or cyanosis        Neck:  JVP normal;no carotid bruit;carotid pulses are full and equal bilaterally        Chest:  clear to auscultation          Cardiac:               Normal S1, S2, 1/6 ELISEO, faint holodiastolic murmur at the LUSB, no rubs or gallops    Abdomen:      soft, non-tender, + bruit    Vascular:                                     2+ bilateral femoral, dp, radial and carotid    Extremities and Back:  no edema;no deformities, clubbing, cyanosis, erythema observed              Neurological:  no gross motor deficits              CC  Kenn Jean-Baptiste MD  [address]  [place] [id number]                Orders Placed This Encounter   Procedures     CTA Chest Abdomen Pelvis w Contrast       No orders of the defined types were placed in this encounter.      Medications Discontinued During This Encounter   Medication Reason     raloxifene (EVISTA) 60 MG tablet Alternate therapy         Encounter Diagnoses   Name Primary?     S/P aortic dissection repair Yes     Aortic valve disorder      HTN (hypertension), benign      Hyperlipidemia LDL goal <100        CURRENT MEDICATIONS:  Current Outpatient Prescriptions   Medication Sig Dispense Refill     Acetaminophen (TYLENOL PO) Take 500 mg by mouth every 4 hours as needed       amLODIPine (NORVASC) 5 MG tablet Take 1 tablet (5 mg) by mouth daily 90 tablet 3     Ascorbic Acid (VITAMIN C PO) Take 500 mg by mouth daily       calcium carbonate (OS-ALEXI 500 MG [place]. CA) 500 MG tablet Take 500 mg by mouth daily       cholecalciferol (VITAMIN D3) 1000 UNITS capsule Take 1 capsule by mouth daily.       fish oil-omega-3 fatty acids (FISH OIL) 1000 MG capsule Take 1 capsule by mouth daily.       labetalol (NORMODYNE) 200 MG tablet TAKE 1 TABLET BY  MOUTH 3 TIMES A DAY 90 tablet 11     lactobacillus rhamnosus, GG, (CULTURELLE) 10 B CELL capsule Take 1 capsule by mouth daily.       losartan (COZAAR) 50 MG tablet Take 1 tablet (50 mg) by mouth daily 90 tablet 3     MAGNESIUM OXIDE PO Take 200 mg by mouth daily Reported on 4/19/2017       polyethylene glycol (MIRALAX/GLYCOLAX) powder Take 1 capful by mouth as needed.       traZODone (DESYREL) 50 MG tablet TAKE 1 TABLET(50 MG) BY MOUTH EVERY NIGHT AS NEEDED FOR SLEEP 90 tablet 1     zolpidem (AMBIEN) 5 MG tablet Take 0.5 tablets (2.5 mg) by mouth nightly as needed for sleep 30 tablet 5     scopolamine (TRANSDERM) (1.5mg base/3day) patch Place 1 patch onto the skin every 72 hours.  Apply to hairless area behind one ear at least 4 hours before travel.  Remove old patch and change every 3 days. 2 patch 1       ALLERGIES     Allergies   Allergen Reactions     Lisinopril      Cough       Simvastatin      myalgias       PAST MEDICAL HISTORY:  Past Medical History:   Diagnosis Date     Anemia      Best vitelliform macular dystrophy      HTN (hypertension), benign      Hx of repair of dissecting thoracic aortic aneurysm, Vasile type A     +fibromuscular dysplasia     Hyperlipidaemia      Hyperlipidemia LDL goal < 130      Insomnia      Osteoporosis, postmenopausal        PAST SURGICAL HISTORY:  Past Surgical History:   Procedure Laterality Date     CARDIAC SURGERY       HC OPEN TX METATARSAL FRACTURE       S/p thoracic aortic aneurysm repair  10/21/12     VASCULAR SURGERY         FAMILY HISTORY:  Family History   Problem Relation Age of Onset     Hypertension Father      best syndrome     Cancer Father      lung     Hypertension Paternal Grandmother      stroke     Alcohol/Drug Brother      best syndr       SOCIAL HISTORY:  Social History     Social History     Marital status: Single     Spouse name: N/A     Number of children: N/A     Years of education: N/A     Social History Main Topics     Smoking status: Former  "Smoker     Quit date: 1/1/1974     Smokeless tobacco: Never Used      Comment: light smoker      Alcohol use 1.8 oz/week     3 Glasses of wine per week      Comment: 2 glasses of wine with dinner about 3 times per week     Drug use: No     Sexual activity: Yes     Partners: Male     Other Topics Concern     Parent/Sibling W/ Cabg, Mi Or Angioplasty Before 65f 55m? 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       Review of Systems:  Skin:  Negative       Eyes:  Positive for glasses    ENT:  Negative      Respiratory:  Negative       Cardiovascular:  Negative      Gastroenterology: Negative      Genitourinary:  not assessed      Musculoskeletal:  Negative      Neurologic:  Negative      Psychiatric:  Negative      Heme/Lymph/Imm:  Negative      Endocrine:  Negative        Physical Exam:  Vitals: /75  Pulse 60  Ht 1.543 m (5' 0.75\")  Wt 57.6 kg (127 lb)  BMI 24.19 kg/m2    Constitutional:  in no acute distress        Skin:  warm and dry to the touch, no apparent skin lesions or masses noted          Head:  normocephalic;no masses or lesions        Eyes:           Lymph:      ENT:  dentition good;no pallor or cyanosis        Neck:  JVP normal;no carotid bruit;carotid pulses are full and equal bilaterally        Respiratory:  clear to auscultation         Cardiac:               Normal S1, S2, 1/6 ELISEO, faint holodiastolic murmur at the LUSB, no rubs or gallops                                      2+ bilateral femoral, dp, radial and carotid    GI:      soft, non-tender, + bruit    Extremities and Muscular Skeletal:  no edema;no deformities, clubbing, cyanosis, erythema observed              Neurological:  no gross motor deficits        Psych:  Alert and Oriented x 3          Thank you for allowing me to participate in the care of your patient.    Sincerely,     Kenn" Mee Jean-Baptiste MD, MD     [facility]

## 2018-09-12 NOTE — PROGRESS NOTES
REASON FOR VISIT:  Followup for type A aortic dissection, status post repair.      PRIMARY CARE PHYSICIAN:  uSsan Pena.      HISTORY OF PRESENT ILLNESS:  I again had the pleasure of seeing Karina Lopez at the [facility] in [place] this afternoon.  She is a very pleasant 77-year-old female with history of type A aortic dissection status post repair, hyperlipidemia and hypertension.      In 10/2012 the patient developed acute, tearing chest pain while flying to [place], [place].  At the time, she was working as a .  She was rushed to a local hospital, where she was diagnosed with acute type A aortic dissection.  She underwent emergent repair of the ascending aorta and resuspension of the aortic valve.  Postoperatively, she developed acute renal failure which resolved conservatively.  The aortic dissection extended down to the left external iliac artery.  Luckily, she did not have any blood flow compromised to her visceral organs or spinal cord.  All of the blood vessels to her visceral organs came off the true lumen.  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  Subsequent imaging demonstrated atrophic left kidney with mild perfusion abnormality.  She was seen by Nephrology, and they did not recommend an intervention with regard to the left renal artery stenosis.      Over the years, she has had multiple CTAs.  The most recent CTA performed in June of last year showed patent aortic arch vessels as well as interval complete resolution of the false lumen within the thoracic aorta.  The left renal artery appears still pinched by the false lumen.  Overall, the CTA findings remain stable.      She has done fairly well over the last year.  She has not had any symptoms including back pain or abdominal pain.  She has some shoulder discomfort las year.  She was able to discontinue simvastain.  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      IMPRESSION, REPORT AND PLAN:   1.  History of type A aortic dissection, status post repair with resuspension of the aortic valve.   2.  Moderate aortic valve regurgitation.   3.  Hypertension.   4.  Hyperlipidemia.        Ms. Lopez continues to do well from a cardiovascular standpoint.  Her last CTA showed stable findings.  She currently does not endorse any symptoms.  Her blood pressure is well controlled with the current medical program. We will obtain CTA of the chest, abdomen and pelvis. We will contact her once the results of CT become available.      We will see her next year for followup.  In the interim, if she develops any symptoms, she will communicate with us.      I appreciate the opportunity to be part of this patient's care.          RAMAKRISHNA DIAZ MD         D: 2016 15:38   T: 2016 22:51   MT: XU      Name:     KELI LOPEZ   MRN:      [id number]        Account:      [id number]   :      1940           Service Date: 2016      Document: [id number]      Orders Placed This Encounter   Procedures     US Thyroid     SLEEP EVALUATION & MANAGEMENT REFERRAL - ADULT     Follow-Up with Cardiologist     Echocardiogram       No orders of the defined types were placed in this encounter.      Medications Discontinued During This Encounter   Medication Reason     labetalol (NORMODYNE) 200 MG tablet Erroneous Entry     rosuvastatin (CRESTOR) 5 MG tablet          Encounter Diagnoses   Name Primary?     S/P aortic dissection repair Yes     Nonrheumatic aortic valve insufficiency      Other fatigue      Aortic valve disorder      Lump in neck        CURRENT MEDICATIONS:  Current Outpatient Prescriptions   Medication Sig Dispense Refill     zolpidem (AMBIEN) 5 MG tablet Take 0.5 tablets (2.5 mg) by mouth nightly as needed for sleep 30 tablet 5     losartan (COZAAR) 50 MG tablet Take 1 tablet (50 mg) by mouth  daily 90 tablet 3     traZODone (DESYREL) 50 MG tablet Take 1 tablet (50 mg) by mouth nightly as needed for sleep 90 tablet 3     labetalol (NORMODYNE) 200 MG tablet TAKE 1 TABLET BY MOUTH 3 TIMES DAILY. 90 tablet 3     amLODIPine (NORVASC) 5 MG tablet Take 1 tablet (5 mg) by mouth daily 90 tablet 3     Acetaminophen (TYLENOL PO) Take 500 mg by mouth every 4 hours as needed       Ascorbic Acid (VITAMIN C PO) Take 500 mg by mouth daily       calcium carbonate (OS-ALEXI 500 MG [place]. CA) 500 MG tablet Take 500 mg by mouth daily       MAGNESIUM OXIDE PO Take 200 mg by mouth daily Reported on 4/19/2017       fish oil-omega-3 fatty acids (FISH OIL) 1000 MG capsule Take 1 capsule by mouth daily.       lactobacillus rhamnosus, GG, (CULTURELLE) 10 B CELL capsule Take 1 capsule by mouth daily.       cholecalciferol (VITAMIN D3) 1000 UNITS capsule Take 1 capsule by mouth daily.       polyethylene glycol (MIRALAX/GLYCOLAX) powder Take 1 capful by mouth as needed.       [DISCONTINUED] labetalol (NORMODYNE) 200 MG tablet Take 1 tablet (200 mg) by mouth 3 times daily 90 tablet 3     raloxifene (EVISTA) 60 MG tablet Take 1 tablet (60 mg) by mouth daily (Patient not taking: Reported on 8/24/2017) 30 tablet 11     scopolamine (TRANSDERM) (1.5mg base/3day) patch Place 1 patch onto the skin every 72 hours.  Apply to hairless area behind one ear at least 4 hours before travel.  Remove old patch and change every 3 days. (Patient not taking: Reported on 8/24/2017) 2 patch 1       ALLERGIES     Allergies   Allergen Reactions     Lisinopril      Cough       Simvastatin      myalgias       PAST MEDICAL HISTORY:  Past Medical History:   Diagnosis Date     Anemia      Best vitelliform macular dystrophy      HTN (hypertension), benign      Hx of repair of dissecting thoracic aortic aneurysm, [place] type A     +fibromuscular dysplasia     Hyperlipidaemia      Hyperlipidemia LDL goal < 130      Insomnia      Osteoporosis, postmenopausal   "      PAST SURGICAL HISTORY:  Past Surgical History:   Procedure Laterality Date     CARDIAC SURGERY       HC OPEN TX METATARSAL FRACTURE       S/p thoracic aortic aneurysm repair  10/21/12     VASCULAR SURGERY         FAMILY HISTORY:  Family History   Problem Relation Age of Onset     Hypertension Father      best syndrome     CANCER Father      lung     Hypertension Paternal Grandmother      stroke     Alcohol/Drug Brother      best syndr       SOCIAL HISTORY:  Social History     Social History     Marital status: Single     Spouse name: N/A     Number of children: N/A     Years of education: N/A     Social History Main Topics     Smoking status: Former Smoker     Start date: 1/1/1974     Smokeless tobacco: Never Used      Comment: light smoker until 1974     Alcohol use 1.8 oz/week     3 Glasses of wine per week      Comment: 3 to 5 drinks per week     Drug use: No     Sexual activity: Yes     Partners: Male     Other Topics Concern     Parent/Sibling W/ Cabg, Mi Or Angioplasty Before 65f 55m? 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       Review of Systems:  Skin:  Negative       Eyes:  Positive for glasses    ENT:  Negative      Respiratory:  Negative       Cardiovascular:    fatigue;Positive for;lightheadedness;dizziness    Gastroenterology: Negative      Genitourinary:  Negative      Musculoskeletal:  Negative      Neurologic:  Negative      Psychiatric:  Negative      Heme/Lymph/Imm:  Negative      Endocrine:  Negative        Physical Exam:  Vitals: /52  Pulse 51  Ht 1.549 m (5' 0.98\")  Wt 57.6 kg (127 lb)  BMI 24.01 kg/m2    Constitutional:  in no acute distress        Skin:  warm and dry to the touch, no apparent skin lesions or masses noted        Head:  normocephalic;no masses or lesions        Eyes:           ENT:  dentition good;no pallor or " cyanosis        Neck:  JVP normal;no carotid bruit;carotid pulses are full and equal bilaterally        Chest:  clear to auscultation          Cardiac:               Normal S1, S2, 1/6 ELISEO, faint holodiastolic murmur at the LUSB, no rubs or gallops    Abdomen:      soft, non-tender, + bruit    Vascular:                                     2+ bilateral femoral, dp, radial and carotid    Extremities and Back:  no edema;no deformities, clubbing, cyanosis, erythema observed              Neurological:  no gross motor deficits              CC  Kenn Jean-Baptiste MD  [address]  [place] [id number]                Orders Placed This Encounter   Procedures     CTA Chest Abdomen Pelvis w Contrast       No orders of the defined types were placed in this encounter.      Medications Discontinued During This Encounter   Medication Reason     raloxifene (EVISTA) 60 MG tablet Alternate therapy         Encounter Diagnoses   Name Primary?     S/P aortic dissection repair Yes     Aortic valve disorder      HTN (hypertension), benign      Hyperlipidemia LDL goal <100        CURRENT MEDICATIONS:  Current Outpatient Prescriptions   Medication Sig Dispense Refill     Acetaminophen (TYLENOL PO) Take 500 mg by mouth every 4 hours as needed       amLODIPine (NORVASC) 5 MG tablet Take 1 tablet (5 mg) by mouth daily 90 tablet 3     Ascorbic Acid (VITAMIN C PO) Take 500 mg by mouth daily       calcium carbonate (OS-ALEXI 500 MG [place]. CA) 500 MG tablet Take 500 mg by mouth daily       cholecalciferol (VITAMIN D3) 1000 UNITS capsule Take 1 capsule by mouth daily.       fish oil-omega-3 fatty acids (FISH OIL) 1000 MG capsule Take 1 capsule by mouth daily.       labetalol (NORMODYNE) 200 MG tablet TAKE 1 TABLET BY MOUTH 3 TIMES A DAY 90 tablet 11     lactobacillus rhamnosus, GG, (CULTURELLE) 10 B CELL capsule Take 1 capsule by mouth daily.       losartan (COZAAR) 50 MG tablet Take 1 tablet (50 mg) by mouth daily 90 tablet 3     MAGNESIUM OXIDE  PO Take 200 mg by mouth daily Reported on 4/19/2017       polyethylene glycol (MIRALAX/GLYCOLAX) powder Take 1 capful by mouth as needed.       traZODone (DESYREL) 50 MG tablet TAKE 1 TABLET(50 MG) BY MOUTH EVERY NIGHT AS NEEDED FOR SLEEP 90 tablet 1     zolpidem (AMBIEN) 5 MG tablet Take 0.5 tablets (2.5 mg) by mouth nightly as needed for sleep 30 tablet 5     scopolamine (TRANSDERM) (1.5mg base/3day) patch Place 1 patch onto the skin every 72 hours.  Apply to hairless area behind one ear at least 4 hours before travel.  Remove old patch and change every 3 days. 2 patch 1       ALLERGIES     Allergies   Allergen Reactions     Lisinopril      Cough       Simvastatin      myalgias       PAST MEDICAL HISTORY:  Past Medical History:   Diagnosis Date     Anemia      Best vitelliform macular dystrophy      HTN (hypertension), benign      Hx of repair of dissecting thoracic aortic aneurysm, [place] type A     +fibromuscular dysplasia     Hyperlipidaemia      Hyperlipidemia LDL goal < 130      Insomnia      Osteoporosis, postmenopausal        PAST SURGICAL HISTORY:  Past Surgical History:   Procedure Laterality Date     CARDIAC SURGERY       HC OPEN TX METATARSAL FRACTURE       S/p thoracic aortic aneurysm repair  10/21/12     VASCULAR SURGERY         FAMILY HISTORY:  Family History   Problem Relation Age of Onset     Hypertension Father      best syndrome     Cancer Father      lung     Hypertension Paternal Grandmother      stroke     Alcohol/Drug Brother      best syndr       SOCIAL HISTORY:  Social History     Social History     Marital status: Single     Spouse name: N/A     Number of children: N/A     Years of education: N/A     Social History Main Topics     Smoking status: Former Smoker     Quit date: 1/1/1974     Smokeless tobacco: Never Used      Comment: light smoker      Alcohol use 1.8 oz/week     3 Glasses of wine per week      Comment: 2 glasses of wine with dinner about 3 times per week     Drug use: No  "    Sexual activity: Yes     Partners: Male     Other Topics Concern     Parent/Sibling W/ Cabg, Mi Or Angioplasty Before 65f 55m? 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       Review of Systems:  Skin:  Negative       Eyes:  Positive for glasses    ENT:  Negative      Respiratory:  Negative       Cardiovascular:  Negative      Gastroenterology: Negative      Genitourinary:  not assessed      Musculoskeletal:  Negative      Neurologic:  Negative      Psychiatric:  Negative      Heme/Lymph/Imm:  Negative      Endocrine:  Negative        Physical Exam:  Vitals: /75  Pulse 60  Ht 1.543 m (5' 0.75\")  Wt 57.6 kg (127 lb)  BMI 24.19 kg/m2    Constitutional:  in no acute distress        Skin:  warm and dry to the touch, no apparent skin lesions or masses noted          Head:  normocephalic;no masses or lesions        Eyes:           Lymph:      ENT:  dentition good;no pallor or cyanosis        Neck:  JVP normal;no carotid bruit;carotid pulses are full and equal bilaterally        Respiratory:  clear to auscultation         Cardiac:               Normal S1, S2, 1/6 ELISEO, faint holodiastolic murmur at the LUSB, no rubs or gallops                                      2+ bilateral femoral, dp, radial and carotid    GI:      soft, non-tender, + bruit    Extremities and Muscular Skeletal:  no edema;no deformities, clubbing, cyanosis, erythema observed              Neurological:  no gross motor deficits        Psych:  Alert and Oriented x 3        CC  Kenn Jean-Baptiste MD  [address]  [place] [address]              "

## 2018-09-12 NOTE — MR AVS SNAPSHOT
After Visit Summary   9/12/2018    Karina Lopez    MRN: [id number]           Patient Information     Date Of Birth          1940        Visit Information        Provider Department      9/12/2018 9:45 AM Kenn Jean-Baptiste MD [facility]   [employer]        Today's Diagnoses     S/P aortic dissection repair    -  1    Aortic valve disorder        HTN (hypertension), benign        Hyperlipidemia LDL goal <100           Follow-ups after your visit        Your next 10 appointments already scheduled     Sep 21, 2018  2:00 PM CDT   CTA CHEST ABDOMEN PELVIS W CONTRAST with SCICT1   [facility] ([facility])    [address]  [address]  [place] [address]   [phone]           How do I prepare for my exam? (Food and drink instructions) **You will have contrast for this exam.** Do not eat or drink for 2 hours before your exam. If you need to take medicine, you may take it with small sips of water. (We may ask you to take liquid medicine as well.)  The day before your exam, drink extra fluids at least six 8-ounce glasses (unless your doctor tells you to restrict your fluids).  How do I prepare for my exam? (Other instructions) Patients over 70 or patients with diabetes or kidney problems: If you haven t had a blood test (creatinine test) within the last 30 days, the Cardiologist/Radiologist may require you to get this test prior to your exam.  What should I wear: Please wear loose clothing, such as a sweat suit or jogging clothes.  Avoid snaps, zippers and other metal. We may ask you to undress and put on a hospital gown.  How long does the exam take: Most scans take less than 20 minutes.  What should I bring: Please bring any scans or X-rays taken at other hospitals, if similar tests were done. Also bring a list of your medicines, including vitamins, minerals and over-the-counter drugs. It is safest to  leave personal items at home.  Do I need a :  No  is needed.  What do I need to tell my doctor? Be sure to tell your doctor: * If you have any allergies. * If there s any chance you are pregnant. * If you are breastfeeding. * If you have diabetes as your medication may need to be adjusted for this exam.  What should I do after the exam: No restrictions, You may resume normal activities.  What is this test: A CT (computed tomography) scan is a series of pictures that allows us to look inside your body. The scanner creates images of the body in cross sections, much like slices of bread. This helps us see any problems more clearly. You may receive contrast (X-ray dye) before or during your scan. Contrast is given through an IV (small needle in your arm).  Who should I call with questions: If you have any questions, please call the Imaging Department where you will have your exam. Directions, parking instructions, and other information is available on our website, [email or web address].              Future tests that were ordered for you today     Open Future Orders        Priority Expected Expires Ordered    CTA Chest Abdomen Pelvis w Contrast Routine 9/13/2018 9/12/2019 9/12/2018            Who to contact     If you have questions or need follow up information about today's clinic visit or your schedule please contact [facility] directly at [phone].  Normal or non-critical lab and imaging results will be communicated to you by MyChart, letter or phone within 4 business days after the clinic has received the results. If you do not hear from us within 7 days, please contact the clinic through MyChart or phone. If you have a critical or abnormal lab result, we will notify you by phone as soon as possible.  Submit refill requests through Submittable or call your pharmacy and they will forward the refill request to us. Please allow 3 business days for your refill to be  "completed.          Additional Information About Your Visit        Care EveryWhere ID     This is your Care EveryWhere ID. This could be used by other organizations to access your [facility] medical records  [id number]        Your Vitals Were     Pulse Height BMI (Body Mass Index)             60 1.543 m (5' 0.75\") 24.19 kg/m2          Blood Pressure from Last 3 Encounters:   09/12/18 130/75   05/24/18 101/55   10/26/17 118/51    Weight from Last 3 Encounters:   09/12/18 57.6 kg (127 lb)   05/24/18 57.6 kg (127 lb)   10/26/17 58.5 kg (129 lb)              We Performed the Following     Follow-Up with Cardiologist          Today's Medication Changes          These changes are accurate as of 9/12/18  3:09 PM.  If you have any questions, ask your nurse or doctor.               Stop taking these medicines if you haven't already. Please contact your care team if you have questions.     raloxifene 60 MG tablet   Commonly known as:  Evista   Stopped by:  Kenn Jean-Baptiste MD                    Primary Care Provider Office Phone # Fax #    Susan Freya Pena PA-C [phone] [address] [address]        Equal Access to Services     STAN BLACK AH: Hadii dl melo hadkelyo Sovivian, waaxda luqadaha, qaybta kaalmada adeegyada, valeria garcia . So [facility] [phone].    ATENCIÓN: Si habla español, tiene a yip disposición servicios gratuitos de asistencia lingüística. Llame al [phone].    We comply with applicable federal civil rights laws and [place] laws. We do not discriminate on the basis of race, color, national origin, age, disability, sex, sexual orientation, or gender identity.            Thank you!     Thank you for choosing [facility]  for your care. Our goal is always to provide you with excellent care. Hearing back from our patients is one way we can continue to improve our services. Please take a few minutes to " complete the written survey that you may receive in the mail after your visit with us. Thank you!             Your Updated Medication List - Protect others around you: Learn how to safely use, store and throw away your medicines at www.disposemymeds.org.          This list is accurate as of 9/12/18  3:09 PM.  Always use your most recent med list.                   Brand Name Dispense Instructions for use Diagnosis    amLODIPine 5 MG tablet    NORVASC    90 tablet    Take 1 tablet (5 mg) by mouth daily        calcium carbonate 500 mg {elemental} 500 MG tablet    OS-ALEXI     Take 500 mg by mouth daily        cholecalciferol 1000 units capsule    vitamin  -D     Take 1 capsule by mouth daily.        fish oil-omega-3 fatty acids 1000 MG capsule      Take 1 capsule by mouth daily.        labetalol 200 MG tablet    NORMODYNE    90 tablet    TAKE 1 TABLET BY MOUTH 3 TIMES A DAY    HTN (hypertension), benign       lactobacillus rhamnosus (GG) 10 B CELL capsule    CULTURELLE     Take 1 capsule by mouth daily.        losartan 50 MG tablet    COZAAR    90 tablet    Take 1 tablet (50 mg) by mouth daily        MAGNESIUM OXIDE PO      Take 200 mg by mouth daily Reported on 4/19/2017        polyethylene glycol powder    MIRALAX/GLYCOLAX     Take 1 capful by mouth as needed.        scopolamine 72 hr patch    TRANSDERM    2 patch    Place 1 patch onto the skin every 72 hours.  Apply to hairless area behind one ear at least 4 hours before travel.  Remove old patch and change every 3 days.    History of motion sickness       traZODone 50 MG tablet    DESYREL    90 tablet    TAKE 1 TABLET(50 MG) BY MOUTH EVERY NIGHT AS NEEDED FOR SLEEP    Primary insomnia       TYLENOL PO      Take 500 mg by mouth every 4 hours as needed        VITAMIN C PO      Take 500 mg by mouth daily        zolpidem 5 MG tablet    AMBIEN    30 tablet    Take 0.5 tablets (2.5 mg) by mouth nightly as needed for sleep    Primary insomnia

## 2018-09-12 NOTE — LETTER
9/12/2018    Susan Pena, SHEILA  [address]  [facility] [address]    RE: Karina HAMPTON Jessica       Dear Colleague,    I had the pleasure of seeing Karina Lopez in the [facility].    REASON FOR VISIT:  Followup for type A aortic dissection, status post repair.      PRIMARY CARE PHYSICIAN:  Susan Pena.      HISTORY OF PRESENT ILLNESS:  I again had the pleasure of seeing Karina Lopez at the [facility] in [place] this afternoon.  She is a very pleasant 77-year-old female with history of type A aortic dissection status post repair, hyperlipidemia and hypertension.      In 10/2012 the patient developed acute, tearing chest pain while flying to [place], [place].  At the time, she was working as a .  She was rushed to a local hospital, where she was diagnosed with acute type A aortic dissection.  She underwent emergent repair of the ascending aorta and resuspension of the aortic valve.  Postoperatively, she developed acute renal failure which resolved conservatively.  The aortic dissection extended down to the left external iliac artery.  Luckily, she did not have any blood flow compromised to her visceral organs or spinal cord.  All of the blood vessels to her visceral organs came off the true lumen.  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  Subsequent imaging demonstrated atrophic left kidney with mild perfusion abnormality.  She was seen by Nephrology, and they did not recommend an intervention with regard to the left renal artery stenosis.      Over the years, she has had multiple CTAs.  The most recent CTA performed in June of last year showed patent aortic arch vessels as well as interval complete resolution of the false lumen within the thoracic aorta.  The left renal artery appears still pinched by the false lumen.  Overall, the CTA findings remain  stable.      She has done fairly well over the last year.  She has not had any symptoms including back pain or abdominal pain.  She has some shoulder discomfort las year.  She was able to discontinue simvastain.  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      IMPRESSION, REPORT AND PLAN:   1.  History of type A aortic dissection, status post repair with resuspension of the aortic valve.   2.  Moderate aortic valve regurgitation.   3.  Hypertension.   4.  Hyperlipidemia.        Ms. Lopez continues to do well from a cardiovascular standpoint.  Her last CTA showed stable findings.  She currently does not endorse any symptoms.  Her blood pressure is well controlled with the current medical program. We will obtain CTA of the chest, abdomen and pelvis. We will contact her once the results of CT become available.      We will see her next year for followup.  In the interim, if she develops any symptoms, she will communicate with us.      I appreciate the opportunity to be part of this patient's care.          RAMAKRISHNA DIAZ MD         D: 2016 15:38   T: 2016 22:51   MT: XU      Name:     KELI LOPEZ   MRN:      [id number]        Account:      [id number]   :      1940           Service Date: 2016      Document: [id number]      Orders Placed This Encounter   Procedures     US Thyroid     SLEEP EVALUATION & MANAGEMENT REFERRAL - ADULT     Follow-Up with Cardiologist     Echocardiogram       No orders of the defined types were placed in this encounter.      Medications Discontinued During This Encounter   Medication Reason     labetalol (NORMODYNE) 200 MG tablet Erroneous Entry     rosuvastatin (CRESTOR) 5 MG tablet          Encounter Diagnoses   Name Primary?     S/P aortic dissection repair Yes     Nonrheumatic aortic valve insufficiency      Other fatigue      Aortic valve disorder      Lump in neck        CURRENT MEDICATIONS:  Current  Outpatient Prescriptions   Medication Sig Dispense Refill     zolpidem (AMBIEN) 5 MG tablet Take 0.5 tablets (2.5 mg) by mouth nightly as needed for sleep 30 tablet 5     losartan (COZAAR) 50 MG tablet Take 1 tablet (50 mg) by mouth daily 90 tablet 3     traZODone (DESYREL) 50 MG tablet Take 1 tablet (50 mg) by mouth nightly as needed for sleep 90 tablet 3     labetalol (NORMODYNE) 200 MG tablet TAKE 1 TABLET BY MOUTH 3 TIMES DAILY. 90 tablet 3     amLODIPine (NORVASC) 5 MG tablet Take 1 tablet (5 mg) by mouth daily 90 tablet 3     Acetaminophen (TYLENOL PO) Take 500 mg by mouth every 4 hours as needed       Ascorbic Acid (VITAMIN C PO) Take 500 mg by mouth daily       calcium carbonate (OS-ALEXI 500 MG [place]. [place]) 500 MG tablet Take 500 mg by mouth daily       MAGNESIUM OXIDE PO Take 200 mg by mouth daily Reported on 4/19/2017       fish oil-omega-3 fatty acids (FISH OIL) 1000 MG capsule Take 1 capsule by mouth daily.       lactobacillus rhamnosus, GG, (CULTURELLE) 10 B CELL capsule Take 1 capsule by mouth daily.       cholecalciferol (VITAMIN D3) 1000 UNITS capsule Take 1 capsule by mouth daily.       polyethylene glycol (MIRALAX/GLYCOLAX) powder Take 1 capful by mouth as needed.       [DISCONTINUED] labetalol (NORMODYNE) 200 MG tablet Take 1 tablet (200 mg) by mouth 3 times daily 90 tablet 3     raloxifene (EVISTA) 60 MG tablet Take 1 tablet (60 mg) by mouth daily (Patient not taking: Reported on 8/24/2017) 30 tablet 11     scopolamine (TRANSDERM) (1.5mg base/3day) patch Place 1 patch onto the skin every 72 hours.  Apply to hairless area behind one ear at least 4 hours before travel.  Remove old patch and change every 3 days. (Patient not taking: Reported on 8/24/2017) 2 patch 1       ALLERGIES     Allergies   Allergen Reactions     Lisinopril      Cough       Simvastatin      myalgias       PAST MEDICAL HISTORY:  Past Medical History:   Diagnosis Date     Anemia      Best vitelliform macular dystrophy      HTN  "(hypertension), benign      Hx of repair of dissecting thoracic aortic aneurysm, Vasile type A     +fibromuscular dysplasia     Hyperlipidaemia      Hyperlipidemia LDL goal < 130      Insomnia      Osteoporosis, postmenopausal        PAST SURGICAL HISTORY:  Past Surgical History:   Procedure Laterality Date     CARDIAC SURGERY       HC OPEN TX METATARSAL FRACTURE       S/p thoracic aortic aneurysm repair  10/21/12     VASCULAR SURGERY         FAMILY HISTORY:  Family History   Problem Relation Age of Onset     Hypertension Father      best syndrome     CANCER Father      lung     Hypertension Paternal Grandmother      stroke     Alcohol/Drug Brother      best syndr       SOCIAL HISTORY:  Social History     Social History     Marital status: Single     Spouse name: N/A     Number of children: N/A     Years of education: N/A     Social History Main Topics     Smoking status: Former Smoker     Start date: 1/1/1974     Smokeless tobacco: Never Used      Comment: light smoker until 1974     Alcohol use 1.8 oz/week     3 Glasses of wine per week      Comment: 3 to 5 drinks per week     Drug use: No     Sexual activity: Yes     Partners: Male     Other Topics Concern     Parent/Sibling W/ Cabg, Mi Or Angioplasty Before 65f 55m? 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       Review of Systems:  Skin:  Negative       Eyes:  Positive for glasses    ENT:  Negative      Respiratory:  Negative       Cardiovascular:    fatigue;Positive for;lightheadedness;dizziness    Gastroenterology: Negative      Genitourinary:  Negative      Musculoskeletal:  Negative      Neurologic:  Negative      Psychiatric:  Negative      Heme/Lymph/Imm:  Negative      Endocrine:  Negative        Physical Exam:  Vitals: /52  Pulse 51  Ht 1.549 m (5' 0.98\")  Wt 57.6 kg (127 lb)  BMI 24.01 " kg/m2    Constitutional:  in no acute distress        Skin:  warm and dry to the touch, no apparent skin lesions or masses noted        Head:  normocephalic;no masses or lesions        Eyes:           ENT:  dentition good;no pallor or cyanosis        Neck:  JVP normal;no carotid bruit;carotid pulses are full and equal bilaterally        Chest:  clear to auscultation          Cardiac:               Normal S1, S2, 1/6 ELISEO, faint holodiastolic murmur at the LUSB, no rubs or gallops    Abdomen:      soft, non-tender, + bruit    Vascular:                                     2+ bilateral femoral, dp, radial and carotid    Extremities and Back:  no edema;no deformities, clubbing, cyanosis, erythema observed              Neurological:  no gross motor deficits              CC  Kenn Jean-Baptiste MD  [address]  [place] [address]                Orders Placed This Encounter   Procedures     CTA Chest Abdomen Pelvis w Contrast       No orders of the defined types were placed in this encounter.      Medications Discontinued During This Encounter   Medication Reason     raloxifene (EVISTA) 60 MG tablet Alternate therapy         Encounter Diagnoses   Name Primary?     S/P aortic dissection repair Yes     Aortic valve disorder      HTN (hypertension), benign      Hyperlipidemia LDL goal <100        CURRENT MEDICATIONS:  Current Outpatient Prescriptions   Medication Sig Dispense Refill     Acetaminophen (TYLENOL PO) Take 500 mg by mouth every 4 hours as needed       amLODIPine (NORVASC) 5 MG tablet Take 1 tablet (5 mg) by mouth daily 90 tablet 3     Ascorbic Acid (VITAMIN C PO) Take 500 mg by mouth daily       calcium carbonate (OS-ALEXI 500 MG [place]. CA) 500 MG tablet Take 500 mg by mouth daily       cholecalciferol (VITAMIN D3) 1000 UNITS capsule Take 1 capsule by mouth daily.       fish oil-omega-3 fatty acids (FISH OIL) 1000 MG capsule Take 1 capsule by mouth daily.       labetalol (NORMODYNE) 200 MG tablet TAKE 1 TABLET BY  MOUTH 3 TIMES A DAY 90 tablet 11     lactobacillus rhamnosus, GG, (CULTURELLE) 10 B CELL capsule Take 1 capsule by mouth daily.       losartan (COZAAR) 50 MG tablet Take 1 tablet (50 mg) by mouth daily 90 tablet 3     MAGNESIUM OXIDE PO Take 200 mg by mouth daily Reported on 4/19/2017       polyethylene glycol (MIRALAX/GLYCOLAX) powder Take 1 capful by mouth as needed.       traZODone (DESYREL) 50 MG tablet TAKE 1 TABLET(50 MG) BY MOUTH EVERY NIGHT AS NEEDED FOR SLEEP 90 tablet 1     zolpidem (AMBIEN) 5 MG tablet Take 0.5 tablets (2.5 mg) by mouth nightly as needed for sleep 30 tablet 5     scopolamine (TRANSDERM) (1.5mg base/3day) patch Place 1 patch onto the skin every 72 hours.  Apply to hairless area behind one ear at least 4 hours before travel.  Remove old patch and change every 3 days. 2 patch 1       ALLERGIES     Allergies   Allergen Reactions     Lisinopril      Cough       Simvastatin      myalgias       PAST MEDICAL HISTORY:  Past Medical History:   Diagnosis Date     Anemia      Best vitelliform macular dystrophy      HTN (hypertension), benign      Hx of repair of dissecting thoracic aortic aneurysm, Vasile type A     +fibromuscular dysplasia     Hyperlipidaemia      Hyperlipidemia LDL goal < 130      Insomnia      Osteoporosis, postmenopausal        PAST SURGICAL HISTORY:  Past Surgical History:   Procedure Laterality Date     CARDIAC SURGERY       HC OPEN TX METATARSAL FRACTURE       S/p thoracic aortic aneurysm repair  10/21/12     VASCULAR SURGERY         FAMILY HISTORY:  Family History   Problem Relation Age of Onset     Hypertension Father      best syndrome     Cancer Father      lung     Hypertension Paternal Grandmother      stroke     Alcohol/Drug Brother      best syndr       SOCIAL HISTORY:  Social History     Social History     Marital status: Single     Spouse name: N/A     Number of children: N/A     Years of education: N/A     Social History Main Topics     Smoking status: Former  "Smoker     Quit date: 1/1/1974     Smokeless tobacco: Never Used      Comment: light smoker      Alcohol use 1.8 oz/week     3 Glasses of wine per week      Comment: 2 glasses of wine with dinner about 3 times per week     Drug use: No     Sexual activity: Yes     Partners: Male     Other Topics Concern     Parent/Sibling W/ Cabg, Mi Or Angioplasty Before 65f 55m? 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       Review of Systems:  Skin:  Negative       Eyes:  Positive for glasses    ENT:  Negative      Respiratory:  Negative       Cardiovascular:  Negative      Gastroenterology: Negative      Genitourinary:  not assessed      Musculoskeletal:  Negative      Neurologic:  Negative      Psychiatric:  Negative      Heme/Lymph/Imm:  Negative      Endocrine:  Negative        Physical Exam:  Vitals: /75  Pulse 60  Ht 1.543 m (5' 0.75\")  Wt 57.6 kg (127 lb)  BMI 24.19 kg/m2    Constitutional:  in no acute distress        Skin:  warm and dry to the touch, no apparent skin lesions or masses noted          Head:  normocephalic;no masses or lesions        Eyes:           Lymph:      ENT:  dentition good;no pallor or cyanosis        Neck:  JVP normal;no carotid bruit;carotid pulses are full and equal bilaterally        Respiratory:  clear to auscultation         Cardiac:               Normal S1, S2, 1/6 ELISEO, faint holodiastolic murmur at the LUSB, no rubs or gallops                                      2+ bilateral femoral, dp, radial and carotid    GI:      soft, non-tender, + bruit    Extremities and Muscular Skeletal:  no edema;no deformities, clubbing, cyanosis, erythema observed              Neurological:  no gross motor deficits        Psych:  Alert and Oriented x 3        CC  Kenn Jean-Baptiste MD  [address]  [place] [address]                Thank you for " allowing me to participate in the care of your patient.      Sincerely,     Kenn Jean-Baptiste MD, MD     [facility]    cc:   Kenn Jean-Baptiste MD  [address]  [place] [address]

## 2018-09-21 ENCOUNTER — HOSPITAL ENCOUNTER (OUTPATIENT)
Dept: CARDIOLOGY | Facility: CLINIC | Age: 78
Discharge: HOME | End: 2018-09-21
Attending: INTERNAL MEDICINE | Admitting: INTERNAL MEDICINE
Payer: MEDICARE

## 2018-09-21 DIAGNOSIS — Z98.890 S/P AORTIC DISSECTION REPAIR: ICD-10-CM

## 2018-09-21 LAB
CREAT BLD-MCNC: 0.9 MG/DL (ref 0.52–1.04)
GFR SERPL CREATININE-BSD FRML MDRD: 61 ML/MIN/1.7M2

## 2018-09-21 PROCEDURE — 25000128 H RX IP 250 OP 636: Performed by: INTERNAL MEDICINE

## 2018-09-21 PROCEDURE — 82565 ASSAY OF CREATININE: CPT

## 2018-09-21 PROCEDURE — 74174 CTA ABD&PLVS W/CONTRAST: CPT

## 2018-09-21 RX ORDER — ACYCLOVIR 200 MG/1
0-1 CAPSULE ORAL
Status: DISCONTINUED | OUTPATIENT
Start: 2018-09-21 | End: 2018-09-22 | Stop reason: HOSPADM

## 2018-09-21 RX ORDER — DIPHENHYDRAMINE HYDROCHLORIDE 50 MG/ML
25-50 INJECTION INTRAMUSCULAR; INTRAVENOUS
Status: DISCONTINUED | OUTPATIENT
Start: 2018-09-21 | End: 2018-09-22 | Stop reason: HOSPADM

## 2018-09-21 RX ORDER — ONDANSETRON 2 MG/ML
4 INJECTION INTRAMUSCULAR; INTRAVENOUS
Status: DISCONTINUED | OUTPATIENT
Start: 2018-09-21 | End: 2018-09-22 | Stop reason: HOSPADM

## 2018-09-21 RX ORDER — DIPHENHYDRAMINE HCL 25 MG
25 CAPSULE ORAL
Status: DISCONTINUED | OUTPATIENT
Start: 2018-09-21 | End: 2018-09-22 | Stop reason: HOSPADM

## 2018-09-21 RX ORDER — IOPAMIDOL 755 MG/ML
500 INJECTION, SOLUTION INTRAVASCULAR ONCE
Status: COMPLETED | OUTPATIENT
Start: 2018-09-21 | End: 2018-09-21

## 2018-09-21 RX ORDER — METHYLPREDNISOLONE SODIUM SUCCINATE 125 MG/2ML
125 INJECTION, POWDER, LYOPHILIZED, FOR SOLUTION INTRAMUSCULAR; INTRAVENOUS
Status: DISCONTINUED | OUTPATIENT
Start: 2018-09-21 | End: 2018-09-22 | Stop reason: HOSPADM

## 2018-09-21 RX ADMIN — IOPAMIDOL 85 ML: 755 INJECTION, SOLUTION INTRAVENOUS at 14:14

## 2018-09-21 RX ADMIN — SODIUM CHLORIDE 100 ML: 9 INJECTION, SOLUTION INTRAVENOUS at 14:14

## 2018-10-11 ENCOUNTER — TELEPHONE (OUTPATIENT)
Dept: CARDIOLOGY | Facility: CLINIC | Age: 78
End: 2018-10-11

## 2018-10-11 DIAGNOSIS — Z98.890 S/P AORTIC DISSECTION REPAIR: Primary | ICD-10-CM

## 2018-10-11 NOTE — TELEPHONE ENCOUNTER
Notes Recorded by Kenn Jean-Baptiste MD on 10/11/2018 at 8:11 AM  CT reviewed. Stable findings. Repeat CT in 2 yrs    Contacted patient to review results and Dr. Jean-Baptiste's recommendations. Patient verbalized understanding and agreed with plan of care.

## 2019-02-04 DIAGNOSIS — I10 BENIGN ESSENTIAL HYPERTENSION: Primary | ICD-10-CM

## 2019-02-04 DIAGNOSIS — F51.01 PRIMARY INSOMNIA: ICD-10-CM

## 2019-02-05 NOTE — TELEPHONE ENCOUNTER
"traZODone (DESYREL) 50 MG tablet 90 tablet 1 8/28/2018  No   Sig: TAKE 1 TABLET(50 MG) BY MOUTH EVERY NIGHT AS NEEDED FOR SLEEP     Last Written Prescription Date:  08/28/2018  Last Fill Quantity: 90,  # refills: 1   Last office visit: 5/24/2018 with prescribing provider:     Future Office Visit:        amLODIPine (NORVASC) 5 MG tablet 90 tablet 3 5/24/2018  No   Sig - Route: Take 1 tablet (5 mg) by mouth daily -     Last Written Prescription Date:  05/24/2018  Last Fill Quantity: 90,  # refills: 3   Last office visit: 5/24/2018 with prescribing provider:     Future Office Visit:      Requested Prescriptions   Pending Prescriptions Disp Refills     amLODIPine (NORVASC) 5 MG tablet [Pharmacy Med Name: AMLODIPINE BESYLATE 5MG TABLETS] 90 tablet 2     Sig: TAKE 1 TABLET(5 MG) BY MOUTH DAILY    Calcium Channel Blockers Protocol  Passed - 2/4/2019  5:33 PM       Passed - Blood pressure under 140/90 in past 12 months    BP Readings from Last 3 Encounters:   09/12/18 130/75   05/24/18 101/55   10/26/17 118/51                Passed - Recent (12 mo) or future (30 days) visit within the authorizing provider's specialty    Patient had office visit in the last 12 months or has a visit in the next 30 days with authorizing provider or within the authorizing provider's specialty.  See \"Patient Info\" tab in inbasket, or \"Choose Columns\" in Meds & Orders section of the refill encounter.             Passed - Medication is active on med list       Passed - Patient is age 18 or older       Passed - No active pregnancy on record       Passed - Normal serum creatinine on file in past 12 months    Recent Labs   Lab Test 09/21/18  1342 05/24/18  1157   CR  --  0.98   CREAT 0.9  --             Passed - No positive pregnancy test in past 12 months        traZODone (DESYREL) 50 MG tablet [Pharmacy Med Name: TRAZODONE 50MG TABLETS] 90 tablet 0     Sig: TAKE 1 TABLET(50 MG) BY MOUTH EVERY NIGHT AS NEEDED FOR SLEEP    Serotonin Modulators Passed " "- 2/4/2019  5:33 PM       Passed - Recent (12 mo) or future (30 days) visit within the authorizing provider's specialty    Patient had office visit in the last 12 months or has a visit in the next 30 days with authorizing provider or within the authorizing provider's specialty.  See \"Patient Info\" tab in inbasket, or \"Choose Columns\" in Meds & Orders section of the refill encounter.             Passed - Medication is active on med list       Passed - Patient is age 18 or older       Passed - No active pregnancy on record       Passed - No positive pregnancy test in past 12 months          "

## 2019-02-06 RX ORDER — TRAZODONE HYDROCHLORIDE 50 MG/1
TABLET, FILM COATED ORAL
Qty: 90 TABLET | Refills: 1 | Status: SHIPPED | OUTPATIENT
Start: 2019-02-06 | End: 2019-07-30

## 2019-02-06 RX ORDER — AMLODIPINE BESYLATE 5 MG/1
TABLET ORAL
Qty: 90 TABLET | Refills: 2 | Status: SHIPPED | OUTPATIENT
Start: 2019-02-06 | End: 2019-07-30

## 2019-02-22 ENCOUNTER — TELEPHONE (OUTPATIENT)
Dept: FAMILY MEDICINE | Facility: CLINIC | Age: 79
End: 2019-02-22

## 2019-02-22 DIAGNOSIS — F51.01 PRIMARY INSOMNIA: ICD-10-CM

## 2019-02-22 RX ORDER — ZOLPIDEM TARTRATE 5 MG/1
2.5 TABLET ORAL
Qty: 14 TABLET | Refills: 0 | Status: SHIPPED | OUTPATIENT
Start: 2019-02-22 | End: 2019-07-30

## 2019-02-22 NOTE — TELEPHONE ENCOUNTER
Please review message below- are you able to deceifer what sleep aid medication  patient is referring to? Ambien? Please advise. Thanks  Evan Anaya CMA on 2/22/2019 at 4:00 PM

## 2019-02-22 NOTE — TELEPHONE ENCOUNTER
Reason for Call:  Medication or medication refill:    Do you use a [facility]?  Name of the pharmacy and phone number for the current request:  [facility] [id number] [facility] [address] [place] [facility] [place] [address]      Name of the medication requested: Pt does not remembered the name    Other request: Pt would like the Sleep aid/Jet leg that was prescribed to her before    Please call pt when ready she will be leaving out of town 2/26/19    Can we leave a detailed message on this number? YES    Phone number patient can be reached at: Home number on file [phone] (home)    Best Time: anytime    Call taken on 2/22/2019 at 3:56 PM by Ana Pablo

## 2019-03-05 ENCOUNTER — TELEPHONE (OUTPATIENT)
Dept: FAMILY MEDICINE | Facility: CLINIC | Age: 79
End: 2019-03-05

## 2019-03-05 DIAGNOSIS — F51.01 PRIMARY INSOMNIA: ICD-10-CM

## 2019-03-05 RX ORDER — ZOLPIDEM TARTRATE 5 MG/1
2.5 TABLET ORAL
Qty: 14 TABLET | Refills: 0 | Status: CANCELLED | OUTPATIENT
Start: 2019-03-05

## 2019-03-05 NOTE — TELEPHONE ENCOUNTER
Prior Authorization Retail Medication Request    Medication/Dose: Zolpidem tartrate 5 mg  ICD code (if different than what is on RX):  F51.01  Previously Tried and Failed:  None  Rationale:  Patient stable on medication since 2013    Insurance Name:  [facility]  Insurance ID:  [id number]      Pharmacy Information (if different than what is on RX)  Name:  [id number]  Phone:  [phone]

## 2019-03-05 NOTE — TELEPHONE ENCOUNTER
Fax from [facility] requesting refill of     Zolpidem 5mg    Rx was just sent 2/25/19 to [facility] (found in faxed file at Anitha's desk).    Is this a duplicate request?  Or does patient need additional medication?    Needs triage.    RT Tomas (R)

## 2019-03-08 NOTE — TELEPHONE ENCOUNTER
PA Initiation    Medication: Zolpidem tartrate 5 mg - INITIATED  Insurance Company: [employer] - Phone [phone] Fax [phone]  Pharmacy Filling the Rx: [facility] [address] [place] [address] [place] [address] AT [facility] [place] [address]  Filling Pharmacy Phone: [phone]  Filling Pharmacy Fax:    Start Date: 3/8/2019

## 2019-03-08 NOTE — TELEPHONE ENCOUNTER
Prior Authorization Approval    Authorization Effective Date: 3/8/2019  Authorization Expiration Date: 3/8/2021  Medication: Zolpidem tartrate 5 mg - APPROVED  Approved Dose/Quantity: 14 FOR 28  Reference #:     Insurance Company: [employer] - [employer] [phone] Fax [phone]  Expected CoPay:       CoPay Card Available:      Foundation Assistance Needed:    Which Pharmacy is filling the prescription (Not needed for infusion/clinic administered): [facility] [address] [place] [address] [address] AT [facility] [place] [address]  Pharmacy Notified: Yes  Patient Notified: Yes

## 2019-03-25 DIAGNOSIS — I10 ESSENTIAL HYPERTENSION, BENIGN: ICD-10-CM

## 2019-03-25 NOTE — TELEPHONE ENCOUNTER
"losartan (COZAAR) 50 MG tablet 90 tablet 3 5/24/2018         Last Written Prescription Date:  05/24/2018  Last Fill Quantity: 90,  # refills: 3   Last office visit: 5/24/2018 with prescribing provider:  Susan Pena   Future Office Visit:  Unknown    Requested Prescriptions   Pending Prescriptions Disp Refills     losartan (COZAAR) 50 MG tablet [Pharmacy Med Name: LOSARTAN 50MG TABLETS] 90 tablet 0     Sig: TAKE ONE TABLET BY MOUTH DAILY.    Angiotensin-II Receptors Passed - 3/25/2019  3:09 PM       Passed - Blood pressure under 140/90 in past 12 months    BP Readings from Last 3 Encounters:   09/12/18 130/75   05/24/18 101/55   10/26/17 118/51                Passed - Recent (12 mo) or future (30 days) visit within the authorizing provider's specialty    Patient had office visit in the last 12 months or has a visit in the next 30 days with authorizing provider or within the authorizing provider's specialty.  See \"Patient Info\" tab in inbasket, or \"Choose Columns\" in Meds & Orders section of the refill encounter.             Passed - Medication is active on med list       Passed - Patient is age 18 or older       Passed - No active pregnancy on record       Passed - Normal serum creatinine on file in past 12 months    Recent Labs   Lab Test 09/21/18  1342 05/24/18  1157   CR  --  0.98   CREAT 0.9  --             Passed - Normal serum potassium on file in past 12 months    Recent Labs   Lab Test 05/24/18  1157   POTASSIUM 4.6                   Passed - No positive pregnancy test in past 12 months          "

## 2019-03-27 RX ORDER — LOSARTAN POTASSIUM 50 MG/1
TABLET ORAL
Qty: 90 TABLET | Refills: 0 | Status: SHIPPED | OUTPATIENT
Start: 2019-03-27 | End: 2019-07-30

## 2019-03-31 ENCOUNTER — TRANSFERRED RECORDS (OUTPATIENT)
Dept: HEALTH INFORMATION MANAGEMENT | Facility: CLINIC | Age: 79
End: 2019-03-31

## 2019-06-19 ENCOUNTER — TELEPHONE (OUTPATIENT)
Dept: FAMILY MEDICINE | Facility: CLINIC | Age: 79
End: 2019-06-19

## 2019-06-19 DIAGNOSIS — I10 HTN (HYPERTENSION), BENIGN: ICD-10-CM

## 2019-06-19 RX ORDER — LABETALOL 200 MG/1
TABLET, FILM COATED ORAL
Qty: 270 TABLET | Refills: 0 | OUTPATIENT
Start: 2019-06-19

## 2019-06-19 RX ORDER — LABETALOL 200 MG/1
TABLET, FILM COATED ORAL
Qty: 90 TABLET | Refills: 0 | Status: SHIPPED | OUTPATIENT
Start: 2019-06-19 | End: 2019-07-18

## 2019-06-19 NOTE — TELEPHONE ENCOUNTER
"Last Written Prescription Date:  5/24/18  Last Fill Quantity: 90,  # refills: 11   Last office visit: 5/24/2018 with prescribing provider:  PCP    Future Office Visit:  None     Medication is being filled for 1 time refill only due to:  Patient needs to be seen because it has been more than one year since last visit.     Routing to [facility] to contact patient to inform due for appointment. Thank you.         Requested Prescriptions   Pending Prescriptions Disp Refills     labetalol (NORMODYNE) 200 MG tablet [Pharmacy Med Name: LABETALOL 200MG TABLETS] 90 tablet 0     Sig: TAKE 1 TABLET BY MOUTH THREE TIMES DAILY       Beta-Blockers Protocol Failed - 6/19/2019  1:08 PM        Failed - Recent (12 mo) or future (30 days) visit within the authorizing provider's specialty     Patient had office visit in the last 12 months or has a visit in the next 30 days with authorizing provider or within the authorizing provider's specialty.  See \"Patient Info\" tab in inbasket, or \"Choose Columns\" in Meds & Orders section of the refill encounter.              Passed - Blood pressure under 140/90 in past 12 months     BP Readings from Last 3 Encounters:   09/12/18 130/75   05/24/18 101/55   10/26/17 118/51                 Passed - Patient is age 6 or older        Passed - Medication is active on med list          "

## 2019-06-19 NOTE — TELEPHONE ENCOUNTER
Rx refused. Duplicate/early request. Pharmacy notified.  Rx was sent today    Izzy Ledezma RN on 6/19/2019 at 3:22 PM

## 2019-07-18 DIAGNOSIS — I10 HTN (HYPERTENSION), BENIGN: ICD-10-CM

## 2019-07-18 NOTE — TELEPHONE ENCOUNTER
"labetalol (NORMODYNE) 200 MG tablet 90 tablet 0 6/19/2019  No   Sig: TAKE 1 TABLET BY MOUTH THREE TIMES DAILY       Last Written Prescription Date:  06/19/2019  Last Fill Quantity: 90,  # refills: 0   Last office visit: 5/24/2018 with prescribing provider:     Future Office Visit:        Requested Prescriptions   Pending Prescriptions Disp Refills     labetalol (NORMODYNE) 200 MG tablet [Pharmacy Med Name: LABETALOL 200MG TABLETS] 90 tablet 0     Sig: TAKE 1 TABLET BY MOUTH THREE TIMES DAILY       Beta-Blockers Protocol Failed - 7/18/2019 12:51 PM        Failed - Recent (12 mo) or future (30 days) visit within the authorizing provider's specialty     Patient had office visit in the last 12 months or has a visit in the next 30 days with authorizing provider or within the authorizing provider's specialty.  See \"Patient Info\" tab in inbasket, or \"Choose Columns\" in Meds & Orders section of the refill encounter.              Passed - Blood pressure under 140/90 in past 12 months     BP Readings from Last 3 Encounters:   09/12/18 130/75   05/24/18 101/55   10/26/17 118/51                 Passed - Patient is age 6 or older        Passed - Medication is active on med list          "

## 2019-07-19 RX ORDER — LABETALOL 200 MG/1
TABLET, FILM COATED ORAL
Qty: 90 TABLET | Refills: 0 | Status: SHIPPED | OUTPATIENT
Start: 2019-07-19 | End: 2019-07-30

## 2019-07-19 NOTE — TELEPHONE ENCOUNTER
Routing refill request to provider for review/approval because:  Matilde given x1 and patient did not follow up, please advise  Patient overdue for OV - 1 month Rx already sent     Left message asking patient to call back to schedule OV      Izzy RABAGO RN       Weeks 10 to 14 of Your Pregnancy: Care Instructions  Your Care Instructions    By weeks 10 to 14 of your pregnancy, the placenta has formed inside your uterus. It is possible to hear your baby's heartbeat with a special ultrasound device. Your baby's eyes can and do move. The arms and legs can bend. This is a good time to think about testing for birth defects. There are two types of tests: screening and diagnostic. Screening tests show the chance that a baby has a certain birth defect. They can't tell you for sure that your baby has a problem. Diagnostic tests show if a baby has a certain birth defect. It's your choice whether to have these tests. You and your partner can talk to your doctor or midwife about birth defects tests. Follow-up care is a key part of your treatment and safety. Be sure to make and go to all appointments, and call your doctor if you are having problems. It's also a good idea to know your test results and keep a list of the medicines you take. How can you care for yourself at home? Decide about tests  · You can have screening tests and diagnostic tests to check for birth defects. The decision to have a test for birth defects is personal. Think about your age, your chance of passing on a family disease, your need to know about any problems, and what you might do after you have the test results. ¨ Triple or quadruple (quad) blood tests. These screening tests can be done between 15 and 20 weeks of pregnancy. They check the amounts of three or four substances in your blood. The doctor looks at these test results, along with your age and other factors, to find out the chance that your baby may have certain problems. ¨ Amniocentesis. This diagnostic test is used to look for chromosomal problems in the baby's cells.  It can be done between 15 and 20 weeks of pregnancy, usually around week 16.  ¨ Nuchal translucency test. This test uses ultrasound to measure the thickness of the area at the back of the baby's neck. An increase in the thickness can be an early sign of Down syndrome. ¨ Chorionic villus sampling (CVS). This is a test that looks for certain genetic problems with your baby. The same genes that are in your baby are in the placenta. A small piece of the placenta is taken out and tested. This test is done when you are 10 to 13 weeks pregnant. Ease discomfort  · Slow down and take naps when you feel tired. · If your emotions swing, talk to someone. Crying, anxiety, and concentration problems are common. · If your gums bleed, try a softer toothbrush. If your gums are puffy and bleed a lot, see your dentist.  · If you feel dizzy:  ¨ Get up slowly after sitting or lying down. ¨ Drink plenty of fluids. ¨ Eat small snacks to keep your blood sugar stable. ¨ Put your head between your legs as though you were tying your shoelaces. ¨ Lie down with your legs higher than your head. Use pillows to prop up your feet. · If you have a headache:  ¨ Lie down. ¨ Ask your partner or a good friend for a neck massage. ¨ Try cool cloths over your forehead or across the back of your neck. ¨ Use acetaminophen (Tylenol) for pain relief. Do not use nonsteroidal anti-inflammatory drugs (NSAIDs), such as ibuprofen (Advil, Motrin) or naproxen (Aleve), unless your doctor says it is okay. · If you have a nosebleed, pinch your nose gently, and hold it for a short while. To prevent nosebleeds, try massaging a small dab of petroleum jelly, such as Vaseline, in your nostrils. · If your nose is stuffed up, try saline (saltwater) nose sprays. Do not use decongestant sprays. Care for your breasts  · Wear a bra that gives you good support. · Know that changes in your breasts are normal.  ¨ Your breasts may get larger and more tender. Tenderness usually gets better by 12 weeks. ¨ Your nipples may get darker and larger, and small bumps around your nipples may show more.   ¨ The veins in your chest and breasts may show more. · Don't worry about \"toughening'\" your nipples. Breastfeeding will naturally do this. Where can you learn more? Go to http://andrey-jaycee.info/. Enter A660 in the search box to learn more about \"Weeks 10 to 14 of Your Pregnancy: Care Instructions. \"  Current as of: May 30, 2016  Content Version: 11.2  © 5785-6549 [employer]. Care instructions adapted under license by [employer] (which disclaims liability or warranty for this information). If you have questions about a medical condition or this instruction, always ask your healthcare professional. [address] any warranty or liability for your use of this information.

## 2019-07-30 ENCOUNTER — OFFICE VISIT (OUTPATIENT)
Dept: FAMILY MEDICINE | Facility: CLINIC | Age: 79
End: 2019-07-30
Payer: COMMERCIAL

## 2019-07-30 VITALS
TEMPERATURE: 98.7 F | OXYGEN SATURATION: 95 % | WEIGHT: 129.1 LBS | SYSTOLIC BLOOD PRESSURE: 105 MMHG | DIASTOLIC BLOOD PRESSURE: 52 MMHG | HEART RATE: 60 BPM | HEIGHT: 61 IN | BODY MASS INDEX: 24.37 KG/M2

## 2019-07-30 DIAGNOSIS — I10 HTN (HYPERTENSION), BENIGN: Primary | ICD-10-CM

## 2019-07-30 DIAGNOSIS — F32.0 MILD MAJOR DEPRESSION (H): ICD-10-CM

## 2019-07-30 DIAGNOSIS — Z23 NEED FOR VACCINATION: ICD-10-CM

## 2019-07-30 DIAGNOSIS — N18.30 CKD (CHRONIC KIDNEY DISEASE) STAGE 3, GFR 30-59 ML/MIN (H): ICD-10-CM

## 2019-07-30 DIAGNOSIS — F51.01 PRIMARY INSOMNIA: ICD-10-CM

## 2019-07-30 PROCEDURE — 90471 IMMUNIZATION ADMIN: CPT | Performed by: PHYSICIAN ASSISTANT

## 2019-07-30 PROCEDURE — 99214 OFFICE O/P EST MOD 30 MIN: CPT | Mod: 25 | Performed by: PHYSICIAN ASSISTANT

## 2019-07-30 PROCEDURE — 90714 TD VACC NO PRESV 7 YRS+ IM: CPT | Performed by: PHYSICIAN ASSISTANT

## 2019-07-30 PROCEDURE — 80048 BASIC METABOLIC PNL TOTAL CA: CPT | Performed by: PHYSICIAN ASSISTANT

## 2019-07-30 PROCEDURE — 36415 COLL VENOUS BLD VENIPUNCTURE: CPT | Performed by: PHYSICIAN ASSISTANT

## 2019-07-30 RX ORDER — LOSARTAN POTASSIUM 50 MG/1
50 TABLET ORAL DAILY
Qty: 90 TABLET | Refills: 3 | Status: SHIPPED | OUTPATIENT
Start: 2019-07-30 | End: 2020-03-16

## 2019-07-30 RX ORDER — AMLODIPINE BESYLATE 5 MG/1
TABLET ORAL
Qty: 90 TABLET | Refills: 3 | Status: SHIPPED | OUTPATIENT
Start: 2019-07-30 | End: 2020-03-16

## 2019-07-30 RX ORDER — LABETALOL 200 MG/1
200 TABLET, FILM COATED ORAL 3 TIMES DAILY
Qty: 90 TABLET | Refills: 3 | Status: SHIPPED | OUTPATIENT
Start: 2019-07-30 | End: 2020-03-16

## 2019-07-30 RX ORDER — TRAZODONE HYDROCHLORIDE 50 MG/1
TABLET, FILM COATED ORAL
Qty: 90 TABLET | Refills: 1 | Status: SHIPPED | OUTPATIENT
Start: 2019-07-30 | End: 2020-03-16

## 2019-07-30 ASSESSMENT — PATIENT HEALTH QUESTIONNAIRE - PHQ9: SUM OF ALL RESPONSES TO PHQ QUESTIONS 1-9: 0

## 2019-07-30 ASSESSMENT — MIFFLIN-ST. JEOR: SCORE: 1012.97

## 2019-07-30 NOTE — LETTER
[facility]  [address] [facility]  [address]  [place]  [address]  Tel: [phone]    July 31, 2019    Karina Lopez  [address]    [place] [address]        Dear Ms. Lopez,    Your electrolytes and blood sugar were normal.  Your kidney function is worse than last year.  This could be from taking Advil (ibuprofen) so please avoid that and take tylenol inst    If you have any further questions or problems, please contact our office.      Sincerely,    Susan Pena PA-C/ Julita Loredo, CMA  Results for orders placed or performed in visit on 07/30/19   Basic metabolic panel   Result Value Ref Range    Sodium 141 133 - 144 mmol/L    Potassium 4.6 3.4 - 5.3 mmol/L    Chloride 109 94 - 109 mmol/L    Carbon Dioxide 25 20 - 32 mmol/L    Anion Gap 7 3 - 14 mmol/L    Glucose 89 70 - 99 mg/dL    Urea Nitrogen 21 7 - 30 mg/dL    Creatinine 1.05 (H) 0.52 - 1.04 mg/dL    GFR Estimate 51 (L) >60 mL/min/[1.73_m2]    GFR Estimate If Black 60 (L) >60 mL/min/[1.73_m2]    Calcium 8.8 8.5 - 10.1 mg/dL               Enclosure: Lab Results

## 2019-07-30 NOTE — PROGRESS NOTES
HPI: Karina is a 77 yo female is here for f/u HTN, depression, insomnia and CKD.  Pt needs refills of her medications  States she is feeling great and has no concerns.  She is also working out the [employer] which is helping her general well being.  HTN; not monitoring her BP often but is normal when she does  Tolerates her medications without SE  Insomnia: she is taking trazodone only as needed.  She had some ambien for jet lag but told no longer approp at her age.  Depression: feels that is in complete remission    Update:  pt just completed 24 sessions of PT for her back pain at [facility] and her back is feeling much better.    Past Medical History:   Diagnosis Date     Anemia      Best vitelliform macular dystrophy      HTN (hypertension), benign      Hx of repair of dissecting thoracic aortic aneurysm, Vasile type A     +fibromuscular dysplasia     Hyperlipidaemia      Hyperlipidemia LDL goal < 130      Insomnia      Osteoporosis, postmenopausal      Past Surgical History:   Procedure Laterality Date     CARDIAC SURGERY       HC OPEN TX METATARSAL FRACTURE       S/p thoracic aortic aneurysm repair  10/21/12     VASCULAR SURGERY       Social History     Tobacco Use     Smoking status: Former Smoker     Last attempt to quit: 1974     Years since quittin.6     Smokeless tobacco: Never Used     Tobacco comment: light smoker    Substance Use Topics     Alcohol use: Yes     Alcohol/week: 1.8 oz     Types: 3 Glasses of wine per week     Comment: I glass wine with dinner     Current Outpatient Medications   Medication Sig Dispense Refill     Acetaminophen (TYLENOL PO) Take 500 mg by mouth every 4 hours as needed       amLODIPine (NORVASC) 5 MG tablet TAKE 1 TABLET(5 MG) BY MOUTH DAILY 90 tablet 3     Ascorbic Acid (VITAMIN C PO) Take 500 mg by mouth daily       calcium carbonate (OS-ALEXI 500 MG [place]. [place]) 500 MG tablet Take 500 mg by mouth daily       cholecalciferol (VITAMIN D3) 1000 UNITS capsule Take 1 capsule by  "mouth daily.       labetalol (NORMODYNE) 200 MG tablet Take 1 tablet (200 mg) by mouth 3 times daily 90 tablet 3     losartan (COZAAR) 50 MG tablet Take 1 tablet (50 mg) by mouth daily 90 tablet 3     MAGNESIUM OXIDE PO Take 200 mg by mouth daily Reported on 4/19/2017       traZODone (DESYREL) 50 MG tablet TAKE 1 TABLET(50 MG) BY MOUTH EVERY NIGHT AS NEEDED FOR SLEEP 90 tablet 1     Allergies   Allergen Reactions     Lisinopril      Cough       Simvastatin      myalgias     FAMILY HISTORY NOTED AND REVIEWED    PHYSICAL EXAM:    /52 (BP Location: Right arm, Cuff Size: Adult Regular)   Pulse 60   Temp 98.7  F (37.1  C) (Oral)   Ht 1.549 m (5' 1\")   Wt 58.6 kg (129 lb 1.6 oz)   SpO2 95%   BMI 24.39 kg/m      Patient appears non toxic  Lungs: CTA bilat  Heart: RRR without m/r/g.  Extr: no edema  Psych: approp affect and mood    Assessment and Plan:     (I10) HTN (hypertension), benign  (primary encounter diagnosis)  Comment: well controlled, cont same.  Plan: losartan (COZAAR) 50 MG tablet, labetalol         (NORMODYNE) 200 MG tablet, Basic metabolic         panel            (F32.0) Mild major depression (H)  Comment:   Plan: pt feels this in remission. PHQ 9 completed.    (N18.3) CKD (chronic kidney disease) stage 3, GFR 30-59 ml/min (H)  Comment:   Plan: BMP today    (F51.01) Primary insomnia  Comment: pt to discontinue any use of ambien but she was only using that for travel so not a problem to stop.   Plan: traZODone (DESYREL) 50 MG tablet        Refilled. She is only using this prn as has been sleeping better.    (Z23) Need for vaccination  Comment:   Plan: TD PRSERV FREE >=7 YRS ADS IM [31289], 1st          Administration  [02446]          Patient Instructions   Shingrex is the new shingles vaccine; check with insurance to find where it is most cost effective.    Mammogram [address] today          Susan Pena PA-C        "

## 2019-07-30 NOTE — NURSING NOTE
Screening Questionnaire for Adult Immunization    Are you sick today?   No   Do you have allergies to medications, food, a vaccine component or latex?   Yes   Have you ever had a serious reaction after receiving a vaccination?   No   Do you have a long-term health problem with heart disease, lung disease, asthma, kidney disease, metabolic disease (e.g. diabetes), anemia, or other blood disorder?   Yes   Do you have cancer, leukemia, HIV/AIDS, or any other immune system problem?   No   In the past 3 months, have you taken medications that affect  your immune system, such as prednisone, other steroids, or anticancer drugs; drugs for the treatment of rheumatoid arthritis, Crohn s disease, or psoriasis; or have you had radiation treatments?   No   Have you had a seizure, or a brain or other nervous system problem?   No   During the past year, have you received a transfusion of blood or blood     products, or been given immune (gamma) globulin or antiviral drug?   No   For women: Are you pregnant or is there a chance you could become        pregnant during the next month?   No   Have you received any vaccinations in the past 4 weeks?   No     Immunization questionnaire was positive for at least one answer.  Notified Susan Pena.        Per orders of SAPNA Khan, injection of Td given by Dea Sherman. Patient instructed to remain in clinic for 15 minutes afterwards, and to report any adverse reaction to me immediately.     Patient verified  Screening performed by Dea Sherman on 7/30/2019 at 3:22 PM.

## 2019-07-30 NOTE — PATIENT INSTRUCTIONS
Shingrex is the new shingles vaccine; check with insurance to find where it is most cost effective.    Mammogram [address] today

## 2019-07-31 ENCOUNTER — HOSPITAL ENCOUNTER (OUTPATIENT)
Dept: MAMMOGRAPHY | Facility: CLINIC | Age: 79
Discharge: HOME | End: 2019-07-31
Attending: PHYSICIAN ASSISTANT | Admitting: PHYSICIAN ASSISTANT
Payer: COMMERCIAL

## 2019-07-31 DIAGNOSIS — Z12.31 VISIT FOR SCREENING MAMMOGRAM: ICD-10-CM

## 2019-07-31 LAB
ANION GAP SERPL CALCULATED.3IONS-SCNC: 7 MMOL/L (ref 3–14)
BUN SERPL-MCNC: 21 MG/DL (ref 7–30)
CALCIUM SERPL-MCNC: 8.8 MG/DL (ref 8.5–10.1)
CHLORIDE SERPL-SCNC: 109 MMOL/L (ref 94–109)
CO2 SERPL-SCNC: 25 MMOL/L (ref 20–32)
CREAT SERPL-MCNC: 1.05 MG/DL (ref 0.52–1.04)
GFR SERPL CREATININE-BSD FRML MDRD: 51 ML/MIN/{1.73_M2}
GLUCOSE SERPL-MCNC: 89 MG/DL (ref 70–99)
POTASSIUM SERPL-SCNC: 4.6 MMOL/L (ref 3.4–5.3)
SODIUM SERPL-SCNC: 141 MMOL/L (ref 133–144)

## 2019-07-31 PROCEDURE — 77063 BREAST TOMOSYNTHESIS BI: CPT

## 2019-07-31 NOTE — RESULT ENCOUNTER NOTE
It was a pleasure seeing you.  I wanted to get back to you with your test results.  I have enclosed a copy for your records.    Your electrolytes and blood sugar were normal.  Your kidney function is worse than last year.  This could be from taking Advil (ibuprofen) so please avoid that and take tylenol instead.    Susan Pena PA-C

## 2019-09-26 DIAGNOSIS — I10 HTN (HYPERTENSION), BENIGN: ICD-10-CM

## 2019-09-27 RX ORDER — LOSARTAN POTASSIUM 50 MG/1
TABLET ORAL
Qty: 90 TABLET | Refills: 0 | OUTPATIENT
Start: 2019-09-27

## 2019-09-27 RX ORDER — LABETALOL 200 MG/1
TABLET, FILM COATED ORAL
Qty: 270 TABLET | Refills: 2 | Status: SHIPPED | OUTPATIENT
Start: 2019-09-27 | End: 2020-10-29

## 2019-09-27 RX ORDER — AMLODIPINE BESYLATE 5 MG/1
TABLET ORAL
Qty: 90 TABLET | Refills: 0 | OUTPATIENT
Start: 2019-09-27

## 2019-09-27 NOTE — TELEPHONE ENCOUNTER
"  Labetalol 200mg   Last Written Prescription Date:  7/30/19  Last Fill Quantity: 90 (30 day supply),  # refills: 3   Last office visit: 7/30/2019 with prescribing provider:  PCP     Prescription approved per [facility] Refill Protocol.      Rxs refused:   Duplicate/early request. Pharmacy notified.  Amlodopine - 7/30/19, #90 with 3 refills   Losartan - 7/30/19, #90 with 3 refills    Future Office Visit:   Next 5 appointments (look out 90 days)    Nov 19, 2019  2:45 PM CST  Return Visit with Kenn Jean-Baptiste MD  [facility] ([facility]) [address] [place] [id number] [phone] OPT 2         Izzy RABAGO RN      Requested Prescriptions   Pending Prescriptions Disp Refills     amLODIPine (NORVASC) 5 MG tablet [Pharmacy Med Name: AMLODIPINE BESYLATE 5MG TABLETS] 90 tablet 0     Sig: TAKE 1 TABLET(5 MG) BY MOUTH DAILY       Calcium Channel Blockers Protocol  Failed - 9/26/2019  8:59 AM        Failed - Normal serum creatinine on file in past 12 months     Recent Labs   Lab Test 07/30/19  1458 09/21/18  1342   CR 1.05*  --    CREAT  --  0.9             Passed - Blood pressure under 140/90 in past 12 months     BP Readings from Last 3 Encounters:   07/30/19 105/52   09/12/18 130/75   05/24/18 101/55                 Passed - Recent (12 mo) or future (30 days) visit within the authorizing provider's specialty     Patient had office visit in the last 12 months or has a visit in the next 30 days with authorizing provider or within the authorizing provider's specialty.  See \"Patient Info\" tab in inbasket, or \"Choose Columns\" in Meds & Orders section of the refill encounter.              Passed - Medication is active on med list        Passed - Patient is age 18 or older        Passed - No active pregnancy on record        Passed - No positive pregnancy test in past 12 months        losartan (COZAAR) 50 MG tablet [Pharmacy Med Name: LOSARTAN 50MG TABLETS] 90 tablet 0 " "    Sig: TAKE 1 TABLET BY MOUTH DAILY       Angiotensin-II Receptors Failed - 9/26/2019  8:59 AM        Failed - Normal serum creatinine on file in past 12 months     Recent Labs   Lab Test 07/30/19  1458 09/21/18  1342   CR 1.05*  --    CREAT  --  0.9             Passed - Last blood pressure under 140/90 in past 12 months     BP Readings from Last 3 Encounters:   07/30/19 105/52   09/12/18 130/75   05/24/18 101/55                 Passed - Recent (12 mo) or future (30 days) visit within the authorizing provider's specialty     Patient had office visit in the last 12 months or has a visit in the next 30 days with authorizing provider or within the authorizing provider's specialty.  See \"Patient Info\" tab in inbasket, or \"Choose Columns\" in Meds & Orders section of the refill encounter.              Passed - Medication is active on med list        Passed - Patient is age 18 or older        Passed - No active pregnancy on record        Passed - Normal serum potassium on file in past 12 months     Recent Labs   Lab Test 07/30/19  1458   POTASSIUM 4.6                    Passed - No positive pregnancy test in past 12 months        labetalol (NORMODYNE) 200 MG tablet [Pharmacy Med Name: LABETALOL 200MG TABLETS] 90 tablet 0     Sig: TAKE 1 TABLET BY MOUTH THREE TIMES DAILY       Beta-Blockers Protocol Passed - 9/26/2019  8:59 AM        Passed - Blood pressure under 140/90 in past 12 months     BP Readings from Last 3 Encounters:   07/30/19 105/52   09/12/18 130/75   05/24/18 101/55                 Passed - Patient is age 6 or older        Passed - Recent (12 mo) or future (30 days) visit within the authorizing provider's specialty     Patient had office visit in the last 12 months or has a visit in the next 30 days with authorizing provider or within the authorizing provider's specialty.  See \"Patient Info\" tab in inbasket, or \"Choose Columns\" in Meds & Orders section of the refill encounter.              Passed - " Medication is active on med list

## 2019-10-26 DIAGNOSIS — F51.01 PRIMARY INSOMNIA: ICD-10-CM

## 2019-10-26 NOTE — TELEPHONE ENCOUNTER
"Last Written Prescription Date:  7/30/19  Last Fill Quantity: 90 tablet,  # refills: 1   Last office visit: 7/30/2019 with prescribing provider:  Becky   Future Office Visit:   Next 5 appointments (look out 90 days)    Nov 19, 2019  2:45 PM CST  Return Visit with Kenn Jean-Baptiste MD  [facility] ([facility]) [address] [place] [id number] [phone] OPT 2         Requested Prescriptions   Pending Prescriptions Disp Refills     traZODone (DESYREL) 50 MG tablet [Pharmacy Med Name: TRAZODONE 50MG TABLETS] 90 tablet 0     Sig: TAKE 1 TABLET(50 MG) BY MOUTH EVERY NIGHT AS NEEDED FOR SLEEP       Serotonin Modulators Passed - 10/26/2019  5:03 AM        Passed - Recent (12 mo) or future (30 days) visit within the authorizing provider's specialty     Patient has had an office visit with the authorizing provider or a provider within the authorizing providers department within the previous 12 mos or has a future within next 30 days. See \"Patient Info\" tab in inbasket, or \"Choose Columns\" in Meds & Orders section of the refill encounter.              Passed - Medication is active on med list        Passed - Patient is age 18 or older        Passed - No active pregnancy on record        Passed - No positive pregnancy test in past 12 months          "

## 2019-10-28 RX ORDER — TRAZODONE HYDROCHLORIDE 50 MG/1
TABLET, FILM COATED ORAL
Qty: 90 TABLET | Refills: 0 | OUTPATIENT
Start: 2019-10-28

## 2019-10-28 NOTE — TELEPHONE ENCOUNTER
RX refused.    Pharmacy notified.     Request too soon.   Rx 7-30-19  #90 x 1 refill.  Refill should still be available.     Karina MILAN RN,BSN

## 2019-11-13 NOTE — TELEPHONE ENCOUNTER
"  Zolpidem       Last Written Prescription Date:  2/22/19  Last Fill Quantity: 14,   # refills: 0 - discontinued 7/30/19    Per OV notes 7/30/19 \"She had some ambien for jet lag but told no longer approp at her age.\"     Last Office Visit: 7/30/19    Future Office visit:    Next 5 appointments (look out 90 days)    Nov 19, 2019  2:45 PM CST  Return Visit with Kenn Jean-Baptiste MD  [facility] ([facility]) [address] [place] [id number] [phone] OPT 2          "

## 2019-11-14 RX ORDER — ZOLPIDEM TARTRATE 5 MG/1
TABLET ORAL
Qty: 14 TABLET | Refills: 0 | OUTPATIENT
Start: 2019-11-14

## 2019-11-14 NOTE — TELEPHONE ENCOUNTER
No longer taking per last OV note July 2019 with Susan Pena PA-C   Taking Trazodone.    Bria Ackerman RN on 11/14/2019 at 12:29 PM

## 2019-11-19 ENCOUNTER — OFFICE VISIT (OUTPATIENT)
Dept: CARDIOLOGY | Facility: CLINIC | Age: 79
End: 2019-11-19
Payer: COMMERCIAL

## 2019-11-19 VITALS
DIASTOLIC BLOOD PRESSURE: 56 MMHG | HEART RATE: 71 BPM | BODY MASS INDEX: 24.32 KG/M2 | HEIGHT: 61 IN | WEIGHT: 128.8 LBS | SYSTOLIC BLOOD PRESSURE: 131 MMHG

## 2019-11-19 DIAGNOSIS — I10 HTN (HYPERTENSION), BENIGN: ICD-10-CM

## 2019-11-19 DIAGNOSIS — Z98.890 S/P AORTIC DISSECTION REPAIR: Primary | ICD-10-CM

## 2019-11-19 DIAGNOSIS — E78.5 HYPERLIPIDEMIA LDL GOAL <100: ICD-10-CM

## 2019-11-19 PROCEDURE — 99214 OFFICE O/P EST MOD 30 MIN: CPT | Performed by: INTERNAL MEDICINE

## 2019-11-19 ASSESSMENT — MIFFLIN-ST. JEOR: SCORE: 1011.61

## 2019-11-19 NOTE — LETTER
11/19/2019    Susan Pena, SHEILA  [address] [facility] [address]  [facility] [address]    RE: Karina HAMPTON Jessica       Dear Colleague,    I had the pleasure of seeing Karina Lopez in the [facility].    HPI and Plan:   REASON FOR VISIT:  Followup for type A aortic dissection, status post repair.      PRIMARY CARE PHYSICIAN:  Susan Pena.      HISTORY OF PRESENT ILLNESS:  I again had the pleasure of seeing Karina Lopez at the [facility] in [place] this afternoon.  She is a very pleasant 78-year-old female with history of type A aortic dissection status post repair, hyperlipidemia and hypertension.      In 10/2012 the patient developed acute, tearing chest pain while flying to [place], [place].  At the time, she was working as a .  She was rushed to a local hospital, where she was diagnosed with acute type A aortic dissection.  She underwent emergent repair of the ascending aorta and resuspension of the aortic valve.  Postoperatively, she developed acute renal failure which resolved conservatively.  The aortic dissection extended down to the left external iliac artery.  Luckily, she did not have any blood flow compromised to her visceral organs or spinal cord.  All of the blood vessels to her visceral organs came off the true lumen.  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  Subsequent imaging demonstrated atrophic left kidney with mild perfusion abnormality.  She was seen by Nephrology, and they did not recommend an intervention with regard to the left renal artery stenosis.      Over the years, she has had multiple CTAs.  The most recent CTA performed on september of last year showed patent aortic arch vessels as well as interval complete resolution of the false lumen within the thoracic aorta.  The left renal artery appears still pinched by the false lumen.  Overall,  the CTA findings remain stable.      She has done fairly well over the last year.  She has not had any symptoms including back pain or abdominal pain.  She has some shoulder discomfort few years ago.  She was able to discontinue simvastain.  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 Her most recent lipid panel showed mildly elevated LDL     IMPRESSION, REPORT AND PLAN:   1.  History of type A aortic dissection, status post repair with resuspension of the aortic valve.   2.  Moderate aortic valve regurgitation.   3.  Hypertension.   4.  Hyperlipidemia.        Ms. Lopez continues to do well from a cardiovascular standpoint.  Her last CTA showed stable findings.  She currently does not endorse any symptoms.  Her blood pressure is well controlled with the current medical program. We will obtain CTA of the chest, abdomen and pelvis next year and see her for follow up afterwards. In the interim, if she develops any symptoms, she will communicate with us.     I asked to her watch her diet and weight.      I appreciate the opportunity to be part of this patient's care.          RAMAKRISHNA DIAZ MD         Orders Placed This Encounter   Procedures     US Thyroid     SLEEP EVALUATION & MANAGEMENT REFERRAL - ADULT     Follow-Up with Cardiologist     Echocardiogram       No orders of the defined types were placed in this encounter.      Medications Discontinued During This Encounter   Medication Reason     labetalol (NORMODYNE) 200 MG tablet Erroneous Entry     rosuvastatin (CRESTOR) 5 MG tablet          Encounter Diagnoses   Name Primary?     S/P aortic dissection repair Yes     Nonrheumatic aortic valve insufficiency      Other fatigue      Aortic valve disorder      Lump in neck        CURRENT MEDICATIONS:  Current Outpatient Prescriptions   Medication Sig Dispense Refill     zolpidem (AMBIEN) 5 MG tablet Take 0.5 tablets (2.5 mg) by mouth nightly as needed for sleep  30 tablet 5     losartan (COZAAR) 50 MG tablet Take 1 tablet (50 mg) by mouth daily 90 tablet 3     traZODone (DESYREL) 50 MG tablet Take 1 tablet (50 mg) by mouth nightly as needed for sleep 90 tablet 3     labetalol (NORMODYNE) 200 MG tablet TAKE 1 TABLET BY MOUTH 3 TIMES DAILY. 90 tablet 3     amLODIPine (NORVASC) 5 MG tablet Take 1 tablet (5 mg) by mouth daily 90 tablet 3     Acetaminophen (TYLENOL PO) Take 500 mg by mouth every 4 hours as needed       Ascorbic Acid (VITAMIN C PO) Take 500 mg by mouth daily       calcium carbonate (OS-ALEXI 500 MG [place]. CA) 500 MG tablet Take 500 mg by mouth daily       MAGNESIUM OXIDE PO Take 200 mg by mouth daily Reported on 4/19/2017       fish oil-omega-3 fatty acids (FISH OIL) 1000 MG capsule Take 1 capsule by mouth daily.       lactobacillus rhamnosus, GG, (CULTURELLE) 10 B CELL capsule Take 1 capsule by mouth daily.       cholecalciferol (VITAMIN D3) 1000 UNITS capsule Take 1 capsule by mouth daily.       polyethylene glycol (MIRALAX/GLYCOLAX) powder Take 1 capful by mouth as needed.       [DISCONTINUED] labetalol (NORMODYNE) 200 MG tablet Take 1 tablet (200 mg) by mouth 3 times daily 90 tablet 3     raloxifene (EVISTA) 60 MG tablet Take 1 tablet (60 mg) by mouth daily (Patient not taking: Reported on 8/24/2017) 30 tablet 11     scopolamine (TRANSDERM) (1.5mg base/3day) patch Place 1 patch onto the skin every 72 hours.  Apply to hairless area behind one ear at least 4 hours before travel.  Remove old patch and change every 3 days. (Patient not taking: Reported on 8/24/2017) 2 patch 1       ALLERGIES     Allergies   Allergen Reactions     Lisinopril      Cough       Simvastatin      myalgias       PAST MEDICAL HISTORY:  Past Medical History:   Diagnosis Date     Anemia      Best vitelliform macular dystrophy      HTN (hypertension), benign      Hx of repair of dissecting thoracic aortic aneurysm, [place] type A     +fibromuscular dysplasia     Hyperlipidaemia       "Hyperlipidemia LDL goal < 130      Insomnia      Osteoporosis, postmenopausal        PAST SURGICAL HISTORY:  Past Surgical History:   Procedure Laterality Date     CARDIAC SURGERY       HC OPEN TX METATARSAL FRACTURE       S/p thoracic aortic aneurysm repair  10/21/12     VASCULAR SURGERY         FAMILY HISTORY:  Family History   Problem Relation Age of Onset     Hypertension Father      best syndrome     CANCER Father      lung     Hypertension Paternal Grandmother      stroke     Alcohol/Drug Brother      best syndr       SOCIAL HISTORY:  Social History     Social History     Marital status: Single     Spouse name: N/A     Number of children: N/A     Years of education: N/A     Social History Main Topics     Smoking status: Former Smoker     Start date: 1/1/1974     Smokeless tobacco: Never Used      Comment: light smoker until 1974     Alcohol use 1.8 oz/week     3 Glasses of wine per week      Comment: 3 to 5 drinks per week     Drug use: No     Sexual activity: Yes     Partners: Male     Other Topics Concern     Parent/Sibling W/ Cabg, Mi Or Angioplasty Before 65f 55m? 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       Review of Systems:  Skin:  Negative       Eyes:  Positive for glasses    ENT:  Negative      Respiratory:  Negative       Cardiovascular:    fatigue;Positive for;lightheadedness;dizziness    Gastroenterology: Negative      Genitourinary:  Negative      Musculoskeletal:  Negative      Neurologic:  Negative      Psychiatric:  Negative      Heme/Lymph/Imm:  Negative      Endocrine:  Negative        Physical Exam:  Vitals: /52  Pulse 51  Ht 1.549 m (5' 0.98\")  Wt 57.6 kg (127 lb)  BMI 24.01 kg/m2    Constitutional:  in no acute distress        Skin:  warm and dry to the touch, no apparent skin lesions or masses noted        Head:  normocephalic;no " masses or lesions        Eyes:           ENT:  dentition good;no pallor or cyanosis        Neck:  JVP normal;no carotid bruit;carotid pulses are full and equal bilaterally        Chest:  clear to auscultation          Cardiac:               Normal S1, S2, 1/6 ELISEO, faint holodiastolic murmur at the LUSB, no rubs or gallops    Abdomen:      soft, non-tender, + bruit    Vascular:                                     2+ bilateral femoral, dp, radial and carotid    Extremities and Back:  no edema;no deformities, clubbing, cyanosis, erythema observed              Neurological:  no gross motor deficits              CC  Kenn Jean-Baptiste MD  [address]  [place] [address]                Orders Placed This Encounter   Procedures     CTA Chest Abdomen Pelvis w Contrast       No orders of the defined types were placed in this encounter.      Medications Discontinued During This Encounter   Medication Reason     raloxifene (EVISTA) 60 MG tablet Alternate therapy         Encounter Diagnoses   Name Primary?     S/P aortic dissection repair Yes     Aortic valve disorder      HTN (hypertension), benign      Hyperlipidemia LDL goal <100        CURRENT MEDICATIONS:  Current Outpatient Prescriptions   Medication Sig Dispense Refill     Acetaminophen (TYLENOL PO) Take 500 mg by mouth every 4 hours as needed       amLODIPine (NORVASC) 5 MG tablet Take 1 tablet (5 mg) by mouth daily 90 tablet 3     Ascorbic Acid (VITAMIN C PO) Take 500 mg by mouth daily       calcium carbonate (OS-ALEXI 500 MG [place]. [place]) 500 MG tablet Take 500 mg by mouth daily       cholecalciferol (VITAMIN D3) 1000 UNITS capsule Take 1 capsule by mouth daily.       fish oil-omega-3 fatty acids (FISH OIL) 1000 MG capsule Take 1 capsule by mouth daily.       labetalol (NORMODYNE) 200 MG tablet TAKE 1 TABLET BY MOUTH 3 TIMES A DAY 90 tablet 11     lactobacillus rhamnosus, GG, (CULTURELLE) 10 B CELL capsule Take 1 capsule by mouth daily.       losartan (COZAAR) 50 MG  tablet Take 1 tablet (50 mg) by mouth daily 90 tablet 3     MAGNESIUM OXIDE PO Take 200 mg by mouth daily Reported on 4/19/2017       polyethylene glycol (MIRALAX/GLYCOLAX) powder Take 1 capful by mouth as needed.       traZODone (DESYREL) 50 MG tablet TAKE 1 TABLET(50 MG) BY MOUTH EVERY NIGHT AS NEEDED FOR SLEEP 90 tablet 1     zolpidem (AMBIEN) 5 MG tablet Take 0.5 tablets (2.5 mg) by mouth nightly as needed for sleep 30 tablet 5     scopolamine (TRANSDERM) (1.5mg base/3day) patch Place 1 patch onto the skin every 72 hours.  Apply to hairless area behind one ear at least 4 hours before travel.  Remove old patch and change every 3 days. 2 patch 1       ALLERGIES     Allergies   Allergen Reactions     Lisinopril      Cough       Simvastatin      myalgias       PAST MEDICAL HISTORY:  Past Medical History:   Diagnosis Date     Anemia      Best vitelliform macular dystrophy      HTN (hypertension), benign      Hx of repair of dissecting thoracic aortic aneurysm, [place] type A     +fibromuscular dysplasia     Hyperlipidaemia      Hyperlipidemia LDL goal < 130      Insomnia      Osteoporosis, postmenopausal        PAST SURGICAL HISTORY:  Past Surgical History:   Procedure Laterality Date     CARDIAC SURGERY       HC OPEN TX METATARSAL FRACTURE       S/p thoracic aortic aneurysm repair  10/21/12     VASCULAR SURGERY         FAMILY HISTORY:  Family History   Problem Relation Age of Onset     Hypertension Father      best syndrome     Cancer Father      lung     Hypertension Paternal Grandmother      stroke     Alcohol/Drug Brother      best syndr       SOCIAL HISTORY:  Social History     Social History     Marital status: Single     Spouse name: N/A     Number of children: N/A     Years of education: N/A     Social History Main Topics     Smoking status: Former Smoker     Quit date: 1/1/1974     Smokeless tobacco: Never Used      Comment: light smoker      Alcohol use 1.8 oz/week     3 Glasses of wine per week       "Comment: 2 glasses of wine with dinner about 3 times per week     Drug use: No     Sexual activity: Yes     Partners: Male     Other Topics Concern     Parent/Sibling W/ Cabg, Mi Or Angioplasty Before 65f 55m? 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       Review of Systems:  Skin:  Negative       Eyes:  Positive for glasses    ENT:  Negative      Respiratory:  Negative       Cardiovascular:  Negative      Gastroenterology: Negative      Genitourinary:  not assessed      Musculoskeletal:  Negative      Neurologic:  Negative      Psychiatric:  Negative      Heme/Lymph/Imm:  Negative      Endocrine:  Negative        Physical Exam:  Vitals: /75  Pulse 60  Ht 1.543 m (5' 0.75\")  Wt 57.6 kg (127 lb)  BMI 24.19 kg/m2    Constitutional:  in no acute distress        Skin:  warm and dry to the touch, no apparent skin lesions or masses noted          Head:  normocephalic;no masses or lesions        Eyes:           Lymph:      ENT:  dentition good;no pallor or cyanosis        Neck:  JVP normal;no carotid bruit;carotid pulses are full and equal bilaterally        Respiratory:  clear to auscultation         Cardiac:               Normal S1, S2, 1/6 ELISEO, faint holodiastolic murmur at the LUSB, no rubs or gallops                                      2+ bilateral femoral, dp, radial and carotid    GI:      soft, non-tender, + bruit    Extremities and Muscular Skeletal:  no edema;no deformities, clubbing, cyanosis, erythema observed              Neurological:  no gross motor deficits        Psych:  Alert and Oriented x 3        CC  Kenn Jean-Baptiste MD  [address]  [place] [address]                Orders Placed This Encounter   Procedures     CTA Chest Abdomen with Contrast     Follow-Up with Cardiologist       No orders of the defined types were placed in this " encounter.      There are no discontinued medications.      Encounter Diagnoses   Name Primary?     S/P aortic dissection repair Yes     HTN (hypertension), benign      Hyperlipidemia LDL goal <100        CURRENT MEDICATIONS:  Current Outpatient Medications   Medication Sig Dispense Refill     amLODIPine (NORVASC) 5 MG tablet TAKE 1 TABLET(5 MG) BY MOUTH DAILY 90 tablet 3     Ascorbic Acid (VITAMIN C PO) Take 500 mg by mouth daily       calcium carbonate (OS-ALEXI 500 MG [place]. [place]) 500 MG tablet Take 500 mg by mouth daily       cholecalciferol (VITAMIN D3) 1000 UNITS capsule Take 1 capsule by mouth daily.       labetalol (NORMODYNE) 200 MG tablet TAKE 1 TABLET BY MOUTH THREE TIMES DAILY 270 tablet 2     labetalol (NORMODYNE) 200 MG tablet Take 1 tablet (200 mg) by mouth 3 times daily 90 tablet 3     losartan (COZAAR) 50 MG tablet Take 1 tablet (50 mg) by mouth daily 90 tablet 3     MAGNESIUM OXIDE PO Take 200 mg by mouth daily Reported on 4/19/2017       traZODone (DESYREL) 50 MG tablet TAKE 1 TABLET(50 MG) BY MOUTH EVERY NIGHT AS NEEDED FOR SLEEP 90 tablet 1     Acetaminophen (TYLENOL PO) Take 500 mg by mouth every 4 hours as needed         ALLERGIES     Allergies   Allergen Reactions     Lisinopril      Cough       Simvastatin      myalgias       PAST MEDICAL HISTORY:  Past Medical History:   Diagnosis Date     Anemia      Aortic valve insufficiency, etiology of cardiac valve disease unspecified 4/13/2016     Best vitelliform macular dystrophy      CKD (chronic kidney disease) stage 3, GFR 30-59 ml/min (H) 12/9/2015     HTN (hypertension), benign      Hx of repair of dissecting thoracic aortic aneurysm, [place] type A     +fibromuscular dysplasia     Hyperlipidemia LDL goal < 130      Insomnia      ASH (obstructive sleep apnea)(mild AHI=14) 4/12/2015     Osteoporosis, postmenopausal        PAST SURGICAL HISTORY:  Past Surgical History:   Procedure Laterality Date     CARDIAC SURGERY       HC OPEN TX METATARSAL  FRACTURE       S/p thoracic aortic aneurysm repair  10/21/12     VASCULAR SURGERY         FAMILY HISTORY:  Family History   Problem Relation Age of Onset     Hypertension Father         best syndrome     Cancer Father         lung     Hypertension Paternal Grandmother         stroke     Alcohol/Drug Brother         best syndr       SOCIAL HISTORY:  Social History     Socioeconomic History     Marital status: Single     Spouse name: None     Number of children: None     Years of education: None     Highest education level: None   Occupational History     None   Social Needs     Financial resource strain: None     Food insecurity:     Worry: None     Inability: None     Transportation needs:     Medical: None     Non-medical: None   Tobacco Use     Smoking status: Former Smoker     Last attempt to quit: 1974     Years since quittin.9     Smokeless tobacco: Never Used     Tobacco comment: light smoker    Substance and Sexual Activity     Alcohol use: Yes     Alcohol/week: 3.0 standard drinks     Types: 3 Glasses of wine per week     Comment: I glass wine with dinner     Drug use: No     Sexual activity: Yes     Partners: Male   Lifestyle     Physical activity:     Days per week: None     Minutes per session: None     Stress: None   Relationships     Social connections:     Talks on phone: None     Gets together: None     Attends Rastafarian service: None     Active member of club or organization: None     Attends meetings of clubs or organizations: None     Relationship status: None     Intimate partner violence:     Fear of current or ex partner: None     Emotionally abused: None     Physically abused: None     Forced sexual activity: None   Other Topics Concern     Parent/sibling w/ CABG, MI or angioplasty before 65F 55M? 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       Review of Systems:  Skin:  Negative       Eyes:  Positive for glasses    ENT:  Positive for postnasal drainage    Respiratory:  Positive for cough has cold and cough from drainage   Cardiovascular:  Negative Positive for;chest pain dull CP occ  Gastroenterology: Negative      Genitourinary:  not assessed      Musculoskeletal:  Negative      Neurologic:  Negative      Psychiatric:  Negative      Heme/Lymph/Imm:  Negative      Endocrine:  Negative        Physical Exam:  Vitals: /56   Pulse 71   Ht 1.549 m (5' 1\")   Wt 58.4 kg (128 lb 12.8 oz)   BMI 24.34 kg/m       Constitutional:  in no acute distress        Skin:  warm and dry to the touch, no apparent skin lesions or masses noted          Head:  normocephalic;no masses or lesions        Eyes:           Lymph:      ENT:  dentition good;no pallor or cyanosis        Neck:  JVP normal;no carotid bruit;carotid pulses are full and equal bilaterally        Respiratory:  clear to auscultation         Cardiac:               Normal S1, S2, 1/6 ELISEO, faint holodiastolic murmur at the LUSB, no rubs or gallops                                      2+ bilateral femoral, dp, radial and carotid    GI:      soft, non-tender, + bruit    Extremities and Muscular Skeletal:  no edema;no deformities, clubbing, cyanosis, erythema observed              Neurological:  no gross motor deficits        Psych:  Alert and Oriented x 3        CC  Susan Pena, PA-C  [address] [facility] [address]  [place] [address]            Thank you for allowing me to participate in the care of your patient.    Sincerely,     Kenn Jean-Baptiste MD, MD     [facility]" [facility]

## 2019-11-19 NOTE — PROGRESS NOTES
HPI and Plan:   REASON FOR VISIT:  Followup for type A aortic dissection, status post repair.      PRIMARY CARE PHYSICIAN:  Susan Pena.      HISTORY OF PRESENT ILLNESS:  I again had the pleasure of seeing Karina Lopez at the [facility] in [place] this afternoon.  She is a very pleasant 78-year-old female with history of type A aortic dissection status post repair, hyperlipidemia and hypertension.      In 10/2012 the patient developed acute, tearing chest pain while flying to [place], [place].  At the time, she was working as a .  She was rushed to a local hospital, where she was diagnosed with acute type A aortic dissection.  She underwent emergent repair of the ascending aorta and resuspension of the aortic valve.  Postoperatively, she developed acute renal failure which resolved conservatively.  The aortic dissection extended down to the left external iliac artery.  Luckily, she did not have any blood flow compromised to her visceral organs or spinal cord.  All of the blood vessels to her visceral organs came off the true lumen.  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  Subsequent imaging demonstrated atrophic left kidney with mild perfusion abnormality.  She was seen by Nephrology, and they did not recommend an intervention with regard to the left renal artery stenosis.      Over the years, she has had multiple CTAs.  The most recent CTA performed on september of last year showed patent aortic arch vessels as well as interval complete resolution of the false lumen within the thoracic aorta.  The left renal artery appears still pinched by the false lumen.  Overall, the CTA findings remain stable.      She has done fairly well over the last year.  She has not had any symptoms including back pain or abdominal pain.  She has some shoulder discomfort few years ago.  She was able to discontinue  simvastain.  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 Her most recent lipid panel showed mildly elevated LDL     IMPRESSION, REPORT AND PLAN:   1.  History of type A aortic dissection, status post repair with resuspension of the aortic valve.   2.  Moderate aortic valve regurgitation.   3.  Hypertension.   4.  Hyperlipidemia.        Ms. Lopez continues to do well from a cardiovascular standpoint.  Her last CTA showed stable findings.  She currently does not endorse any symptoms.  Her blood pressure is well controlled with the current medical program. We will obtain CTA of the chest, abdomen and pelvis next year and see her for follow up afterwards. In the interim, if she develops any symptoms, she will communicate with us.     I asked to her watch her diet and weight.      I appreciate the opportunity to be part of this patient's care.          RAMAKRISHNA DIAZ MD         Orders Placed This Encounter   Procedures     US Thyroid     SLEEP EVALUATION & MANAGEMENT REFERRAL - ADULT     Follow-Up with Cardiologist     Echocardiogram       No orders of the defined types were placed in this encounter.      Medications Discontinued During This Encounter   Medication Reason     labetalol (NORMODYNE) 200 MG tablet Erroneous Entry     rosuvastatin (CRESTOR) 5 MG tablet          Encounter Diagnoses   Name Primary?     S/P aortic dissection repair Yes     Nonrheumatic aortic valve insufficiency      Other fatigue      Aortic valve disorder      Lump in neck        CURRENT MEDICATIONS:  Current Outpatient Prescriptions   Medication Sig Dispense Refill     zolpidem (AMBIEN) 5 MG tablet Take 0.5 tablets (2.5 mg) by mouth nightly as needed for sleep 30 tablet 5     losartan (COZAAR) 50 MG tablet Take 1 tablet (50 mg) by mouth daily 90 tablet 3     traZODone (DESYREL) 50 MG tablet Take 1 tablet (50 mg) by mouth nightly as needed for sleep 90 tablet 3     labetalol (NORMODYNE)  200 MG tablet TAKE 1 TABLET BY MOUTH 3 TIMES DAILY. 90 tablet 3     amLODIPine (NORVASC) 5 MG tablet Take 1 tablet (5 mg) by mouth daily 90 tablet 3     Acetaminophen (TYLENOL PO) Take 500 mg by mouth every 4 hours as needed       Ascorbic Acid (VITAMIN C PO) Take 500 mg by mouth daily       calcium carbonate (OS-ALEXI 500 MG [place]. [place]) 500 MG tablet Take 500 mg by mouth daily       MAGNESIUM OXIDE PO Take 200 mg by mouth daily Reported on 4/19/2017       fish oil-omega-3 fatty acids (FISH OIL) 1000 MG capsule Take 1 capsule by mouth daily.       lactobacillus rhamnosus, GG, (CULTURELLE) 10 B CELL capsule Take 1 capsule by mouth daily.       cholecalciferol (VITAMIN D3) 1000 UNITS capsule Take 1 capsule by mouth daily.       polyethylene glycol (MIRALAX/GLYCOLAX) powder Take 1 capful by mouth as needed.       [DISCONTINUED] labetalol (NORMODYNE) 200 MG tablet Take 1 tablet (200 mg) by mouth 3 times daily 90 tablet 3     raloxifene (EVISTA) 60 MG tablet Take 1 tablet (60 mg) by mouth daily (Patient not taking: Reported on 8/24/2017) 30 tablet 11     scopolamine (TRANSDERM) (1.5mg base/3day) patch Place 1 patch onto the skin every 72 hours.  Apply to hairless area behind one ear at least 4 hours before travel.  Remove old patch and change every 3 days. (Patient not taking: Reported on 8/24/2017) 2 patch 1       ALLERGIES     Allergies   Allergen Reactions     Lisinopril      Cough       Simvastatin      myalgias       PAST MEDICAL HISTORY:  Past Medical History:   Diagnosis Date     Anemia      Best vitelliform macular dystrophy      HTN (hypertension), benign      Hx of repair of dissecting thoracic aortic aneurysm, Vasile type A     +fibromuscular dysplasia     Hyperlipidaemia      Hyperlipidemia LDL goal < 130      Insomnia      Osteoporosis, postmenopausal        PAST SURGICAL HISTORY:  Past Surgical History:   Procedure Laterality Date     CARDIAC SURGERY       HC OPEN TX METATARSAL FRACTURE       S/p thoracic  "aortic aneurysm repair  10/21/12     VASCULAR SURGERY         FAMILY HISTORY:  Family History   Problem Relation Age of Onset     Hypertension Father      best syndrome     CANCER Father      lung     Hypertension Paternal Grandmother      stroke     Alcohol/Drug Brother      best syndr       SOCIAL HISTORY:  Social History     Social History     Marital status: Single     Spouse name: N/A     Number of children: N/A     Years of education: N/A     Social History Main Topics     Smoking status: Former Smoker     Start date: 1/1/1974     Smokeless tobacco: Never Used      Comment: light smoker until 1974     Alcohol use 1.8 oz/week     3 Glasses of wine per week      Comment: 3 to 5 drinks per week     Drug use: No     Sexual activity: Yes     Partners: Male     Other Topics Concern     Parent/Sibling W/ Cabg, Mi Or Angioplasty Before 65f 55m? 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       Review of Systems:  Skin:  Negative       Eyes:  Positive for glasses    ENT:  Negative      Respiratory:  Negative       Cardiovascular:    fatigue;Positive for;lightheadedness;dizziness    Gastroenterology: Negative      Genitourinary:  Negative      Musculoskeletal:  Negative      Neurologic:  Negative      Psychiatric:  Negative      Heme/Lymph/Imm:  Negative      Endocrine:  Negative        Physical Exam:  Vitals: /52  Pulse 51  Ht 1.549 m (5' 0.98\")  Wt 57.6 kg (127 lb)  BMI 24.01 kg/m2    Constitutional:  in no acute distress        Skin:  warm and dry to the touch, no apparent skin lesions or masses noted        Head:  normocephalic;no masses or lesions        Eyes:           ENT:  dentition good;no pallor or cyanosis        Neck:  JVP normal;no carotid bruit;carotid pulses are full and equal bilaterally        Chest:  clear to auscultation          Cardiac:            "    Normal S1, S2, 1/6 ELISEO, faint holodiastolic murmur at the LUSB, no rubs or gallops    Abdomen:      soft, non-tender, + bruit    Vascular:                                     2+ bilateral femoral, dp, radial and carotid    Extremities and Back:  no edema;no deformities, clubbing, cyanosis, erythema observed              Neurological:  no gross motor deficits              CC  Kenn Jean-Baptiste MD  [address]  [place] [address]                Orders Placed This Encounter   Procedures     CTA Chest Abdomen Pelvis w Contrast       No orders of the defined types were placed in this encounter.      Medications Discontinued During This Encounter   Medication Reason     raloxifene (EVISTA) 60 MG tablet Alternate therapy         Encounter Diagnoses   Name Primary?     S/P aortic dissection repair Yes     Aortic valve disorder      HTN (hypertension), benign      Hyperlipidemia LDL goal <100        CURRENT MEDICATIONS:  Current Outpatient Prescriptions   Medication Sig Dispense Refill     Acetaminophen (TYLENOL PO) Take 500 mg by mouth every 4 hours as needed       amLODIPine (NORVASC) 5 MG tablet Take 1 tablet (5 mg) by mouth daily 90 tablet 3     Ascorbic Acid (VITAMIN C PO) Take 500 mg by mouth daily       calcium carbonate (OS-ALEXI 500 MG [place]. [place]) 500 MG tablet Take 500 mg by mouth daily       cholecalciferol (VITAMIN D3) 1000 UNITS capsule Take 1 capsule by mouth daily.       fish oil-omega-3 fatty acids (FISH OIL) 1000 MG capsule Take 1 capsule by mouth daily.       labetalol (NORMODYNE) 200 MG tablet TAKE 1 TABLET BY MOUTH 3 TIMES A DAY 90 tablet 11     lactobacillus rhamnosus, GG, (CULTURELLE) 10 B CELL capsule Take 1 capsule by mouth daily.       losartan (COZAAR) 50 MG tablet Take 1 tablet (50 mg) by mouth daily 90 tablet 3     MAGNESIUM OXIDE PO Take 200 mg by mouth daily Reported on 4/19/2017       polyethylene glycol (MIRALAX/GLYCOLAX) powder Take 1 capful by mouth as needed.       traZODone (DESYREL)  50 MG tablet TAKE 1 TABLET(50 MG) BY MOUTH EVERY NIGHT AS NEEDED FOR SLEEP 90 tablet 1     zolpidem (AMBIEN) 5 MG tablet Take 0.5 tablets (2.5 mg) by mouth nightly as needed for sleep 30 tablet 5     scopolamine (TRANSDERM) (1.5mg base/3day) patch Place 1 patch onto the skin every 72 hours.  Apply to hairless area behind one ear at least 4 hours before travel.  Remove old patch and change every 3 days. 2 patch 1       ALLERGIES     Allergies   Allergen Reactions     Lisinopril      Cough       Simvastatin      myalgias       PAST MEDICAL HISTORY:  Past Medical History:   Diagnosis Date     Anemia      Best vitelliform macular dystrophy      HTN (hypertension), benign      Hx of repair of dissecting thoracic aortic aneurysm, [place] type A     +fibromuscular dysplasia     Hyperlipidaemia      Hyperlipidemia LDL goal < 130      Insomnia      Osteoporosis, postmenopausal        PAST SURGICAL HISTORY:  Past Surgical History:   Procedure Laterality Date     CARDIAC SURGERY       HC OPEN TX METATARSAL FRACTURE       S/p thoracic aortic aneurysm repair  10/21/12     VASCULAR SURGERY         FAMILY HISTORY:  Family History   Problem Relation Age of Onset     Hypertension Father      best syndrome     Cancer Father      lung     Hypertension Paternal Grandmother      stroke     Alcohol/Drug Brother      best syndr       SOCIAL HISTORY:  Social History     Social History     Marital status: Single     Spouse name: N/A     Number of children: N/A     Years of education: N/A     Social History Main Topics     Smoking status: Former Smoker     Quit date: 1/1/1974     Smokeless tobacco: Never Used      Comment: light smoker      Alcohol use 1.8 oz/week     3 Glasses of wine per week      Comment: 2 glasses of wine with dinner about 3 times per week     Drug use: No     Sexual activity: Yes     Partners: Male     Other Topics Concern     Parent/Sibling W/ Cabg, Mi Or Angioplasty Before 65f 55m? 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       Review of Systems:  Skin:  Negative       Eyes:  Positive for glasses    ENT:  Negative      Respiratory:  Negative       Cardiovascular:  Negative      Gastroenterology: Negative      Genitourinary:  not assessed      Musculoskeletal:  Negative      Neurologic:  Negative      Psychiatric:  Negative      Heme/Lymph/Imm:  Negative      Endocrine:  Negative        Physical Exam:  Vitals: /75  Pulse 60  Ht 1.543 m (5' 0.75\")  Wt 57.6 kg (127 lb)  BMI 24.19 kg/m2    Constitutional:  in no acute distress        Skin:  warm and dry to the touch, no apparent skin lesions or masses noted          Head:  normocephalic;no masses or lesions        Eyes:           Lymph:      ENT:  dentition good;no pallor or cyanosis        Neck:  JVP normal;no carotid bruit;carotid pulses are full and equal bilaterally        Respiratory:  clear to auscultation         Cardiac:               Normal S1, S2, 1/6 ELISEO, faint holodiastolic murmur at the LUSB, no rubs or gallops                                      2+ bilateral femoral, dp, radial and carotid    GI:      soft, non-tender, + bruit    Extremities and Muscular Skeletal:  no edema;no deformities, clubbing, cyanosis, erythema observed              Neurological:  no gross motor deficits        Psych:  Alert and Oriented x 3        CC  Kenn Jean-Baptiste MD  [address]  [place] [address]                Orders Placed This Encounter   Procedures     CTA Chest Abdomen with Contrast     Follow-Up with Cardiologist       No orders of the defined types were placed in this encounter.      There are no discontinued medications.      Encounter Diagnoses   Name Primary?     S/P aortic dissection repair Yes     HTN (hypertension), benign      Hyperlipidemia LDL goal <100        CURRENT MEDICATIONS:  Current Outpatient " Medications   Medication Sig Dispense Refill     amLODIPine (NORVASC) 5 MG tablet TAKE 1 TABLET(5 MG) BY MOUTH DAILY 90 tablet 3     Ascorbic Acid (VITAMIN C PO) Take 500 mg by mouth daily       calcium carbonate (OS-ALEXI 500 MG [place]. [place]) 500 MG tablet Take 500 mg by mouth daily       cholecalciferol (VITAMIN D3) 1000 UNITS capsule Take 1 capsule by mouth daily.       labetalol (NORMODYNE) 200 MG tablet TAKE 1 TABLET BY MOUTH THREE TIMES DAILY 270 tablet 2     labetalol (NORMODYNE) 200 MG tablet Take 1 tablet (200 mg) by mouth 3 times daily 90 tablet 3     losartan (COZAAR) 50 MG tablet Take 1 tablet (50 mg) by mouth daily 90 tablet 3     MAGNESIUM OXIDE PO Take 200 mg by mouth daily Reported on 4/19/2017       traZODone (DESYREL) 50 MG tablet TAKE 1 TABLET(50 MG) BY MOUTH EVERY NIGHT AS NEEDED FOR SLEEP 90 tablet 1     Acetaminophen (TYLENOL PO) Take 500 mg by mouth every 4 hours as needed         ALLERGIES     Allergies   Allergen Reactions     Lisinopril      Cough       Simvastatin      myalgias       PAST MEDICAL HISTORY:  Past Medical History:   Diagnosis Date     Anemia      Aortic valve insufficiency, etiology of cardiac valve disease unspecified 4/13/2016     Best vitelliform macular dystrophy      CKD (chronic kidney disease) stage 3, GFR 30-59 ml/min (H) 12/9/2015     HTN (hypertension), benign      Hx of repair of dissecting thoracic aortic aneurysm, Vasile type A     +fibromuscular dysplasia     Hyperlipidemia LDL goal < 130      Insomnia      ASH (obstructive sleep apnea)(mild AHI=14) 4/12/2015     Osteoporosis, postmenopausal        PAST SURGICAL HISTORY:  Past Surgical History:   Procedure Laterality Date     CARDIAC SURGERY       HC OPEN TX METATARSAL FRACTURE       S/p thoracic aortic aneurysm repair  10/21/12     VASCULAR SURGERY         FAMILY HISTORY:  Family History   Problem Relation Age of Onset     Hypertension Father         best syndrome     Cancer Father         lung     Hypertension  Paternal Grandmother         stroke     Alcohol/Drug Brother         best syndr       SOCIAL HISTORY:  Social History     Socioeconomic History     Marital status: Single     Spouse name: None     Number of children: None     Years of education: None     Highest education level: None   Occupational History     None   Social Needs     Financial resource strain: None     Food insecurity:     Worry: None     Inability: None     Transportation needs:     Medical: None     Non-medical: None   Tobacco Use     Smoking status: Former Smoker     Last attempt to quit: 1974     Years since quittin.9     Smokeless tobacco: Never Used     Tobacco comment: light smoker    Substance and Sexual Activity     Alcohol use: Yes     Alcohol/week: 3.0 standard drinks     Types: 3 Glasses of wine per week     Comment: I glass wine with dinner     Drug use: No     Sexual activity: Yes     Partners: Male   Lifestyle     Physical activity:     Days per week: None     Minutes per session: None     Stress: None   Relationships     Social connections:     Talks on phone: None     Gets together: None     Attends [employer]oration[employer] service: None     Active member of club or organization: None     Attends meetings of clubs or organizations: None     Relationship status: None     Intimate partner violence:     Fear of current or ex partner: None     Emotionally abused: None     Physically abused: None     Forced sexual activity: None   Other Topics Concern     Parent/sibling w/ CABG, MI or angioplasty before 65F 55M? No      Service Not Asked     Blood Transfusions Not Asked     Caffeine Concern Yes     Comment: 2 cups  a day     Occupational Exposure Not Asked     Hobby Hazards Not Asked     Sleep Concern Not Asked     Stress Concern Not Asked     Weight Concern Not Asked     Special Diet Yes     Back Care Not Asked     Exercise Yes     Comment: go to the [employer]     Bike Helmet Not Asked     Seat Belt Not Asked     Self-Exams Not Asked   Social  "History Narrative    Working as a  PT.     Retired July 2014.       Review of Systems:  Skin:  Negative       Eyes:  Positive for glasses    ENT:  Positive for postnasal drainage    Respiratory:  Positive for cough has cold and cough from drainage   Cardiovascular:  Negative Positive for;chest pain dull CP occ  Gastroenterology: Negative      Genitourinary:  not assessed      Musculoskeletal:  Negative      Neurologic:  Negative      Psychiatric:  Negative      Heme/Lymph/Imm:  Negative      Endocrine:  Negative        Physical Exam:  Vitals: /56   Pulse 71   Ht 1.549 m (5' 1\")   Wt 58.4 kg (128 lb 12.8 oz)   BMI 24.34 kg/m      Constitutional:  in no acute distress        Skin:  warm and dry to the touch, no apparent skin lesions or masses noted          Head:  normocephalic;no masses or lesions        Eyes:           Lymph:      ENT:  dentition good;no pallor or cyanosis        Neck:  JVP normal;no carotid bruit;carotid pulses are full and equal bilaterally        Respiratory:  clear to auscultation         Cardiac:               Normal S1, S2, 1/6 ELISEO, faint holodiastolic murmur at the LUSB, no rubs or gallops                                      2+ bilateral femoral, dp, radial and carotid    GI:      soft, non-tender, + bruit    Extremities and Muscular Skeletal:  no edema;no deformities, clubbing, cyanosis, erythema observed              Neurological:  no gross motor deficits        Psych:  Alert and Oriented x 3        CC  Susan Pena PA-C  [address]  [place] [address]              "

## 2020-03-13 DIAGNOSIS — F51.01 PRIMARY INSOMNIA: ICD-10-CM

## 2020-03-13 DIAGNOSIS — I10 HTN (HYPERTENSION), BENIGN: ICD-10-CM

## 2020-03-13 NOTE — TELEPHONE ENCOUNTER
"Switching RX's to [facility]        Disp  Refills  Start  End  MISA    amLODIPine (NORVASC) 5 MG tablet  90 tablet  3  7/30/2019   No    Sig: TAKE 1 TABLET(5 MG) BY MOUTH DAILY      Disp  Refills  Start  End  [facility]    labetalol (NORMODYNE) 200 MG tablet  270 tablet  2  9/27/2019   No    Sig: TAKE 1 TABLET BY MOUTH THREE TIMES DAILY       Disp  Refills  Start  End  [facility]    losartan (COZAAR) 50 MG tablet  90 tablet  3  7/30/2019   No    Sig - Route: Take 1 tablet (50 mg) by mouth daily - Oral       Disp  Refills  Start  End  [facility]    traZODone (DESYREL) 50 MG tablet  90 tablet  1  7/30/2019   No    Sig: TAKE 1 TABLET(50 MG) BY MOUTH EVERY NIGHT AS NEEDED FOR SLEEP            Requested Prescriptions   Pending Prescriptions Disp Refills     amLODIPine (NORVASC) 5 MG tablet 90 tablet 3     Sig: TAKE 1 TABLET(5 MG) BY MOUTH DAILY       Calcium Channel Blockers Protocol  Failed - 3/13/2020  6:33 PM        Failed - Normal serum creatinine on file in past 12 months     Recent Labs   Lab Test 07/30/19  1458 09/21/18  1342   CR 1.05*  --    CREAT  --  0.9       Ok to refill medication if creatinine is low          Passed - Blood pressure under 140/90 in past 12 months     BP Readings from Last 3 Encounters:   11/19/19 131/56   07/30/19 105/52   09/12/18 130/75                 Passed - Recent (12 mo) or future (30 days) visit within the authorizing provider's specialty     Patient has had an office visit with the authorizing provider or a provider within the authorizing providers department within the previous 12 mos or has a future within next 30 days. See \"Patient Info\" tab in inbasket, or \"Choose Columns\" in Meds & Orders section of the refill encounter.              Passed - Medication is active on med list        Passed - Patient is age 18 or older        Passed - No active pregnancy on record        Passed - No positive pregnancy test in past 12 months           labetalol (NORMODYNE) 200 MG tablet 90 tablet 3 " "    Sig: Take 1 tablet (200 mg) by mouth 3 times daily       Beta-Blockers Protocol Passed - 3/13/2020  6:33 PM        Passed - Blood pressure under 140/90 in past 12 months     BP Readings from Last 3 Encounters:   11/19/19 131/56   07/30/19 105/52   09/12/18 130/75                 Passed - Patient is age 6 or older        Passed - Recent (12 mo) or future (30 days) visit within the authorizing provider's specialty     Patient has had an office visit with the authorizing provider or a provider within the authorizing providers department within the previous 12 mos or has a future within next 30 days. See \"Patient Info\" tab in inbasket, or \"Choose Columns\" in Meds & Orders section of the refill encounter.              Passed - Medication is active on med list           losartan (COZAAR) 50 MG tablet 90 tablet 3     Sig: Take 1 tablet (50 mg) by mouth daily       Angiotensin-II Receptors Failed - 3/13/2020  6:33 PM        Failed - Normal serum creatinine on file in past 12 months     Recent Labs   Lab Test 07/30/19  1458 09/21/18  1342   CR 1.05*  --    CREAT  --  0.9       Ok to refill medication if creatinine is low          Passed - Last blood pressure under 140/90 in past 12 months     BP Readings from Last 3 Encounters:   11/19/19 131/56   07/30/19 105/52   09/12/18 130/75                 Passed - Recent (12 mo) or future (30 days) visit within the authorizing provider's specialty     Patient has had an office visit with the authorizing provider or a provider within the authorizing providers department within the previous 12 mos or has a future within next 30 days. See \"Patient Info\" tab in inbasket, or \"Choose Columns\" in Meds & Orders section of the refill encounter.              Passed - Medication is active on med list        Passed - Patient is age 18 or older        Passed - No active pregnancy on record        Passed - Normal serum potassium on file in past 12 months     Recent Labs   Lab Test " "07/30/19  1458   POTASSIUM 4.6                    Passed - No positive pregnancy test in past 12 months           traZODone (DESYREL) 50 MG tablet 90 tablet 1     Sig: TAKE 1 TABLET(50 MG) BY MOUTH EVERY NIGHT AS NEEDED FOR SLEEP       Serotonin Modulators Passed - 3/13/2020  6:33 PM        Passed - Recent (12 mo) or future (30 days) visit within the authorizing provider's specialty     Patient has had an office visit with the authorizing provider or a provider within the authorizing providers department within the previous 12 mos or has a future within next 30 days. See \"Patient Info\" tab in inbasket, or \"Choose Columns\" in Meds & Orders section of the refill encounter.              Passed - Medication is active on med list        Passed - Patient is age 18 or older        Passed - No active pregnancy on record        Passed - No positive pregnancy test in past 12 months             "

## 2020-03-16 RX ORDER — LABETALOL 200 MG/1
200 TABLET, FILM COATED ORAL 3 TIMES DAILY
Qty: 90 TABLET | Refills: 3 | Status: SHIPPED | OUTPATIENT
Start: 2020-03-16 | End: 2020-10-23

## 2020-03-16 RX ORDER — LOSARTAN POTASSIUM 50 MG/1
50 TABLET ORAL DAILY
Qty: 90 TABLET | Refills: 3 | Status: SHIPPED | OUTPATIENT
Start: 2020-03-16 | End: 2020-10-23

## 2020-03-16 RX ORDER — TRAZODONE HYDROCHLORIDE 50 MG/1
TABLET, FILM COATED ORAL
Qty: 90 TABLET | Refills: 1 | Status: SHIPPED | OUTPATIENT
Start: 2020-03-16 | End: 2020-10-29

## 2020-03-16 RX ORDER — AMLODIPINE BESYLATE 5 MG/1
TABLET ORAL
Qty: 90 TABLET | Refills: 3 | Status: SHIPPED | OUTPATIENT
Start: 2020-03-16 | End: 2020-10-23

## 2020-10-22 DIAGNOSIS — I10 HTN (HYPERTENSION), BENIGN: ICD-10-CM

## 2020-10-22 NOTE — TELEPHONE ENCOUNTER
Refill request:    LOSARTAN 50 MG TABLETS    Summary: Take 1 tablet (50 mg) by mouth daily, Disp-90 tablet,R-3, E-Prescribe   Dose, Route, Frequency: 50 mg, Oral, DAILY  Start: 3/16/2020  Ord/Sold: 3/16/2020      LABETALOL 200 MG TABLETS    Summary: Take 1 tablet (200 mg) by mouth 3 times daily, Disp-90 tablet,R-3, E-Prescribe   Dose, Route, Frequency: 200 mg, Oral, 3 TIMES DAILY  Start: 3/16/2020  Ord/Sold: 3/16/2020      AMLODIPINE 5 MG TABLETS    Summary: TAKE 1 TABLET(5 MG) BY MOUTH DAILY, Disp-90 tablet,R-3, E-Prescribe   Start: 3/16/2020  Ord/Sold: 3/16/2020

## 2020-10-23 RX ORDER — LOSARTAN POTASSIUM 50 MG/1
50 TABLET ORAL DAILY
Qty: 90 TABLET | Refills: 0 | Status: SHIPPED | OUTPATIENT
Start: 2020-10-23 | End: 2020-10-29

## 2020-10-23 RX ORDER — LABETALOL 200 MG/1
200 TABLET, FILM COATED ORAL 3 TIMES DAILY
Qty: 90 TABLET | Refills: 0 | Status: SHIPPED | OUTPATIENT
Start: 2020-10-23 | End: 2020-10-29

## 2020-10-23 RX ORDER — AMLODIPINE BESYLATE 5 MG/1
TABLET ORAL
Qty: 90 TABLET | Refills: 0 | Status: SHIPPED | OUTPATIENT
Start: 2020-10-23 | End: 2020-10-29

## 2020-10-23 NOTE — TELEPHONE ENCOUNTER
Next 5 appointments (look out 90 days)    Oct 29, 2020 12:30 PM  PHYSICAL with SHEILA Tellez [facility] ([facility]) [address] [address]  [phone]   Nov 24, 2020  9:45 AM  Return Visit with MD SHOAIB Gonzalez [facility] ([facility]) [address] [id number] [phone] OPT 2        Labetalol - Medication filled 1 time as pt is due for a follow-up in clinic. Patient is already scheduled for upcoming appointment.    Losartan, Amlodipine - Rxs approved per pharmacy change (written order)

## 2020-10-29 ENCOUNTER — OFFICE VISIT (OUTPATIENT)
Dept: FAMILY MEDICINE | Facility: CLINIC | Age: 80
End: 2020-10-29
Payer: COMMERCIAL

## 2020-10-29 VITALS
SYSTOLIC BLOOD PRESSURE: 122 MMHG | WEIGHT: 126.6 LBS | DIASTOLIC BLOOD PRESSURE: 63 MMHG | OXYGEN SATURATION: 94 % | HEART RATE: 63 BPM | HEIGHT: 61 IN | BODY MASS INDEX: 23.9 KG/M2 | TEMPERATURE: 98.5 F

## 2020-10-29 DIAGNOSIS — D17.30 LIPOMA OF SKIN AND SUBCUTANEOUS TISSUE: ICD-10-CM

## 2020-10-29 DIAGNOSIS — F51.01 PRIMARY INSOMNIA: ICD-10-CM

## 2020-10-29 DIAGNOSIS — R20.0 LEFT ARM NUMBNESS: ICD-10-CM

## 2020-10-29 DIAGNOSIS — E78.5 HYPERLIPIDEMIA LDL GOAL <100: ICD-10-CM

## 2020-10-29 DIAGNOSIS — Z86.79 HX OF REPAIR OF DISSECTING THORACIC AORTIC ANEURYSM, STANFORD TYPE A: ICD-10-CM

## 2020-10-29 DIAGNOSIS — J31.0 CHRONIC RHINITIS: ICD-10-CM

## 2020-10-29 DIAGNOSIS — Z00.00 MEDICARE ANNUAL WELLNESS VISIT, SUBSEQUENT: Primary | ICD-10-CM

## 2020-10-29 DIAGNOSIS — I10 HTN (HYPERTENSION), BENIGN: ICD-10-CM

## 2020-10-29 DIAGNOSIS — Z98.890 HX OF REPAIR OF DISSECTING THORACIC AORTIC ANEURYSM, STANFORD TYPE A: ICD-10-CM

## 2020-10-29 DIAGNOSIS — Z86.59 HISTORY OF DEPRESSION: ICD-10-CM

## 2020-10-29 DIAGNOSIS — N18.30 STAGE 3 CHRONIC KIDNEY DISEASE, UNSPECIFIED WHETHER STAGE 3A OR 3B CKD (H): ICD-10-CM

## 2020-10-29 LAB
ERYTHROCYTE [DISTWIDTH] IN BLOOD BY AUTOMATED COUNT: 13.5 % (ref 10–15)
HCT VFR BLD AUTO: 38.6 % (ref 35–47)
HGB BLD-MCNC: 13 G/DL (ref 11.7–15.7)
MCH RBC QN AUTO: 31.6 PG (ref 26.5–33)
MCHC RBC AUTO-ENTMCNC: 33.7 G/DL (ref 31.5–36.5)
MCV RBC AUTO: 94 FL (ref 78–100)
PLATELET # BLD AUTO: 241 10E9/L (ref 150–450)
RBC # BLD AUTO: 4.12 10E12/L (ref 3.8–5.2)
WBC # BLD AUTO: 6.3 10E9/L (ref 4–11)

## 2020-10-29 PROCEDURE — 80061 LIPID PANEL: CPT | Performed by: PHYSICIAN ASSISTANT

## 2020-10-29 PROCEDURE — 99397 PER PM REEVAL EST PAT 65+ YR: CPT | Performed by: PHYSICIAN ASSISTANT

## 2020-10-29 PROCEDURE — 80053 COMPREHEN METABOLIC PANEL: CPT | Performed by: PHYSICIAN ASSISTANT

## 2020-10-29 PROCEDURE — 85027 COMPLETE CBC AUTOMATED: CPT | Performed by: PHYSICIAN ASSISTANT

## 2020-10-29 PROCEDURE — 36415 COLL VENOUS BLD VENIPUNCTURE: CPT | Performed by: PHYSICIAN ASSISTANT

## 2020-10-29 RX ORDER — TRAZODONE HYDROCHLORIDE 50 MG/1
TABLET, FILM COATED ORAL
Qty: 90 TABLET | Refills: 1 | Status: SHIPPED | OUTPATIENT
Start: 2020-10-29 | End: 2021-06-03

## 2020-10-29 RX ORDER — LOSARTAN POTASSIUM 50 MG/1
50 TABLET ORAL DAILY
Qty: 90 TABLET | Refills: 3 | Status: SHIPPED | OUTPATIENT
Start: 2020-10-29 | End: 2021-10-19

## 2020-10-29 RX ORDER — LABETALOL 200 MG/1
200 TABLET, FILM COATED ORAL 3 TIMES DAILY
Qty: 270 TABLET | Refills: 3 | Status: SHIPPED | OUTPATIENT
Start: 2020-10-29 | End: 2021-10-19

## 2020-10-29 RX ORDER — AMLODIPINE BESYLATE 5 MG/1
TABLET ORAL
Qty: 90 TABLET | Refills: 3 | Status: SHIPPED | OUTPATIENT
Start: 2020-10-29 | End: 2022-01-25

## 2020-10-29 RX ORDER — FLUTICASONE PROPIONATE 50 MCG
1 SPRAY, SUSPENSION (ML) NASAL DAILY
Qty: 16 G | Refills: 3 | Status: SHIPPED | OUTPATIENT
Start: 2020-10-29 | End: 2022-01-25

## 2020-10-29 ASSESSMENT — MIFFLIN-ST. JEOR: SCORE: 988.69

## 2020-10-29 ASSESSMENT — PATIENT HEALTH QUESTIONNAIRE - PHQ9: SUM OF ALL RESPONSES TO PHQ QUESTIONS 1-9: 0

## 2020-10-29 ASSESSMENT — ACTIVITIES OF DAILY LIVING (ADL): CURRENT_FUNCTION: NO ASSISTANCE NEEDED

## 2020-10-29 NOTE — PROGRESS NOTES
SUBJECTIVE:   Karina Lopez is a 79 year old female who presents for Preventive Visit.    She's had 2 episodes of numbness in the left arm   These occurred one month apart  This lasts 5 minutes and then resolves with taking a deep breath  Denies assoc chest pain or sob.  She is scheduled for her CTA of chest/abd next month  She can ask Dr. Jean-Baptiste to have that done sooner    She is reading and keeping herself busy during COVID      Are you in the first 12 months of your Medicare coverage?  No    Healthy Habits:     In general, how would you rate your overall health?  Very good    Frequency of exercise:  2-3 days/week    Duration of exercise:  15-30 minutes    Taking medications regularly:  Yes    Barriers to taking medications:  None    Medication side effects:  None    Ability to successfully perform activities of daily living:  No assistance needed    Home Safety:  Lack of grab bars in the bathroom    Hearing Impairment:  No hearing concerns    In the past 6 months, have you been bothered by leaking of urine?  No    In general, how would you rate your overall mental or emotional health?  Very good      PHQ-2 Total Score: 0    Additional concerns today:  Yes (Intermittent Left arm numbness)    Do you feel safe in your environment? Yes    Have you ever done Advance Care Planning? (For example, a Health Directive, POLST, or a discussion with a medical provider or your loved ones about your wishes): Yes, patient states has an Advance Care Planning document and will bring a copy to the clinic.      Fall risk  Fallen 2 or more times in the past year?: No    Cognitive Screening   1) Repeat 3 items (Leader, Season, Table)    2) Clock draw: NORMAL  3) 3 item recall: Recalls 3 objects  Results: 3 items recalled: COGNITIVE IMPAIRMENT LESS LIKELY    Mini-CogTM Copyright STAR Ibrahim. Licensed by the author for use in [facility] [employer]; reprinted with permission ([email or web address]@.[email or web address]). All rights reserved.      Do you have sleep  apnea, excessive snoring or daytime drowsiness?: yes    Reviewed and updated as needed this visit by clinical staff  Tobacco  Allergies  Meds              Reviewed and updated as needed this visit by Provider                Social History     Tobacco Use     Smoking status: Former Smoker     Quit date: 1974     Years since quittin.8     Smokeless tobacco: Never Used     Tobacco comment: light smoker    Substance Use Topics     Alcohol use: Yes     Alcohol/week: 3.0 standard drinks     Types: 3 Glasses of wine per week     Comment: I glass wine with dinner     If you drink alcohol do you typically have >3 drinks per day or >7 drinks per week? No    Alcohol Use 2017   Prescreen: >3 drinks/day or >7 drinks/week? The patient does not drink >3 drinks per day nor >7 drinks per week.           Current providers sharing in care for this patient include:   Patient Care Team:  Susan Pena PA-C as PCP - General (Internal Medicine)  Susan Pena PA-C as Assigned PCP  Ricky Puri MD as MD (Cardiology)  Kenn Jean-Baptiste MD as Assigned Heart and Vascular Provider    The following health maintenance items are reviewed in Epic and correct as of today:  Health Maintenance   Topic Date Due     HEPATITIS C SCREENING  1958     ZOSTER IMMUNIZATION (2 of 3) 2014     ADVANCE CARE PLANNING  2017     PHQ-9  2020     FALL RISK ASSESSMENT  2020     MEDICARE ANNUAL WELLNESS VISIT  10/13/2021     LIPID  [id number]     DTAP/TDAP/TD IMMUNIZATION (3 - Td) 2029     DEXA  Completed     DEPRESSION ACTION PLAN  Completed     INFLUENZA VACCINE  Completed     Pneumococcal Vaccine: 65+ Years  Completed     Pneumococcal Vaccine: Pediatrics (0 to 5 Years) and At-Risk Patients (6 to 64 Years)  Aged Out     IPV IMMUNIZATION  Aged Out     MENINGITIS IMMUNIZATION  Aged Out     HEPATITIS B IMMUNIZATION  Aged Out       Past Medical History:   Diagnosis Date     Anemia      Aortic valve  "insufficiency, etiology of cardiac valve disease unspecified 4/13/2016     Best vitelliform macular dystrophy      CKD (chronic kidney disease) stage 3, GFR 30-59 ml/min 12/9/2015     HTN (hypertension), benign      Hx of repair of dissecting thoracic aortic aneurysm, [place]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     +fibromuscular dysplasia     Hyperlipidemia LDL goal < 130      Insomnia      ASH (obstructive sleep apnea)(mild AHI=14) 4/12/2015     Osteoporosis, postmenopausal      Past Surgical History:   Procedure Laterality Date     CARDIAC SURGERY       HC OPEN TX METATARSAL FRACTURE       S/p thoracic aortic aneurysm repair  10/21/12     VASCULAR SURGERY       Current Outpatient Medications   Medication Sig Dispense Refill     Acetaminophen (TYLENOL PO) Take 500 mg by mouth every 4 hours as needed       amLODIPine (NORVASC) 5 MG tablet TAKE 1 TABLET(5 MG) BY MOUTH DAILY 90 tablet 3     Ascorbic Acid (VITAMIN C PO) Take 500 mg by mouth daily       calcium carbonate (OS-ALEXI 500 MG [place]) 500 MG tablet Take 500 mg by mouth daily       cholecalciferol (VITAMIN D3) 1000 UNITS capsule Take 1 capsule by mouth daily.       fluticasone (FLONASE) 50 MCG/ACT nasal spray Spray 1 spray into both nostrils daily 16 g 3     labetalol (NORMODYNE) 200 MG tablet Take 1 tablet (200 mg) by mouth 3 times daily 270 tablet 3     losartan (COZAAR) 50 MG tablet Take 1 tablet (50 mg) by mouth daily 90 tablet 3     MAGNESIUM OXIDE PO Take 200 mg by mouth daily Reported on 4/19/2017       traZODone (DESYREL) 50 MG tablet TAKE 1 TABLET(50 MG) BY MOUTH EVERY NIGHT AS NEEDED FOR SLEEP 90 tablet 1       Review of Systems  Constitutional, HEENT, cardiovascular, pulmonary, GI, , musculoskeletal, neuro, skin, endocrine and psych systems are negative, except as otherwise noted.    OBJECTIVE:   /63 (BP Location: Right arm, Cuff Size: Adult Regular)   Pulse 63   Temp 98.5  F (36.9  C) (Tympanic)   Ht 1.537 m (5' 0.5\")   Wt 57.4 kg (126 lb 9.6 oz)   SpO2 94%   " "BMI 24.32 kg/m   Estimated body mass index is 24.32 kg/m  as calculated from the following:    Height as of this encounter: 1.537 m (5' 0.5\").    Weight as of this encounter: 57.4 kg (126 lb 9.6 oz).  Physical Exam  GENERAL APPEARANCE: healthy, alert and no distress  EYES: Eyes grossly normal to inspection, PERRL and conjunctivae and sclerae normal  HENT: ear canals and TM's normal, nose and mouth without ulcers or lesions, oropharynx clear and oral mucous membranes moist  NECK: +rubbery, nontender mass again seen ant neck. no adenopathy, no asymmetry, or scars and thyroid normal to palpation  RESP: lungs clear to auscultation - no rales, rhonchi or wheezes  BREAST: normal without masses, tenderness or nipple discharge and no palpable axillary masses or adenopathy  CV: regular rate and rhythm, normal S1 S2, no S3 or S4, no murmur, click or rub, no peripheral edema and peripheral pulses strong  ABDOMEN: soft, nontender, no hepatosplenomegaly, no masses and bowel sounds normal  MS: no musculoskeletal defects are noted and gait is age appropriate without ataxia  SKIN: no suspicious lesions or rashes  NEURO: Normal strength and tone, sensory exam grossly normal, mentation intact and speech normal  PSYCH: mentation appears normal and affect normal/bright        ASSESSMENT / PLAN:   Assessment and Plan:     (Z00.00) Medicare annual wellness visit, subsequent  (primary encounter diagnosis)  Comment: recd she check insurance RE: shingrex. She is to call and schedule her mammogram  Plan: CBC with platelets            (I10) HTN (hypertension), benign  Comment: well controlled, cont same  Plan: Comprehensive metabolic panel, amLODIPine         (NORVASC) 5 MG tablet, labetalol (NORMODYNE)         200 MG tablet, losartan (COZAAR) 50 MG tablet            (F51.01) Primary insomnia  Comment:   Plan: traZODone (DESYREL) 50 MG tablet        Refilled, she is just taking prn and trying to get off    (R20.0) Left arm numbness  Comment: " "  Plan: no assoc chest pain or sob. Has had 2 episodes over 2 months spaced out by a month. Her CTA chest/abd for hx of thoracic aortic dissection f/u is scheduled for next month and could ask Dr. Jean-Baptiste to move that up.    (E78.5) Hyperlipidemia LDL goal <100  Comment:   Plan: Lipid panel reflex to direct LDL Fasting            (J31.0) Chronic rhinitis  Comment:   Plan: fluticasone (FLONASE) 50 MCG/ACT nasal spray            (D17.30) Lipoma of skin and subcutaneous tissue  Comment: lipoma appears to be getting larger to pt and myself  Plan: GENERAL SURG ADULT REFERRAL            (Z86.59) History of depression  Comment:   Plan: resolved    (N18.30) Stage 3 chronic kidney disease, unspecified whether stage 3a or 3b CKD  Comment:   Plan: CMP    (Z98.890 Hx of repair of dissecting thoracic aortic aneurysm, [place]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 Jean-Baptiste      Patient has been advised of split billing requirements and indicates understanding: Yes  COUNSELING:  Reviewed preventive health counseling, as reflected in patient instructions    Estimated body mass index is 24.32 kg/m  as calculated from the following:    Height as of this encounter: 1.537 m (5' 0.5\").    Weight as of this encounter: 57.4 kg (126 lb 9.6 oz).        She reports that she quit smoking about 46 years ago. She has never used smokeless tobacco.      Appropriate preventive services were discussed with this patient, including applicable screening as appropriate for cardiovascular disease, diabetes, osteopenia/osteoporosis, and glaucoma.  As appropriate for age/gender, discussed screening for colorectal cancer, prostate cancer, breast cancer, and cervical cancer. Checklist reviewing preventive services available has been given to the patient.    Reviewed patients plan of care and provided an AVS. The Basic Care Plan (routine screening as documented in Health Maintenance) for Karina meets the Care Plan requirement. This Care Plan has been established and " reviewed with the Patient.    Counseling Resources:  ATP IV Guidelines  Pooled Cohorts Equation Calculator  Breast Cancer Risk Calculator  Breast Cancer: Medication to Reduce Risk  FRAX Risk Assessment  ICSI Preventive Guidelines  Dietary Guidelines for Americans, 2010  USDA's MyPlate  ASA Prophylaxis  Lung CA Screening    SHEILA Tellez [facility]    Identified Health Risks:

## 2020-10-29 NOTE — LETTER
[facility]  [address] [facility]  [address]  [place]  [address]  Tel: [phone]    November 2, 2020    Karina HAMPTON Jessica  [address]    [place] [facility] [place] [facility] [address]        Dear Ms. Lopez,    It was a pleasure seeing you for your physical examination.  I wanted to get back to you with your test results.  I have enclosed a copy for your review.       I am happy to report that your CBC or complete blood count is normal with no signs of anemia, leukemia or platelet abnormalities. Your chemistry panel shows no signs of diabetes.  Your blood salts, kidney tests, liver tests, and proteins are all fine.     Your total cholesterol is 239 with the normal range being below 200.  Your HDL or good cholesterol is 78 with the normal range being above 50.  Your LDL or bad cholesterol is 138 with the normal range being below 130.  RESTART CRESTOR.     Susan Pena PA-C           Enclosure: Lab Results  Results for orders placed or performed in visit on 10/29/20   Comprehensive metabolic panel     Status: Abnormal   Result Value Ref Range    Sodium 136 133 - 144 mmol/L    Potassium 4.5 3.4 - 5.3 mmol/L    Chloride 103 94 - 109 mmol/L    Carbon Dioxide 29 20 - 32 mmol/L    Anion Gap 4 3 - 14 mmol/L    Glucose 93 70 - 99 mg/dL    Urea Nitrogen 17 7 - 30 mg/dL    Creatinine 0.89 0.52 - 1.04 mg/dL    GFR Estimate 62 >60 mL/min/[1.73_m2]    GFR Estimate If Black 72 >60 mL/min/[1.73_m2]    Calcium 9.8 8.5 - 10.1 mg/dL    Bilirubin Total 1.4 (H) 0.2 - 1.3 mg/dL    Albumin 3.9 3.4 - 5.0 g/dL    Protein Total 7.4 6.8 - 8.8 g/dL    Alkaline Phosphatase 68 40 - 150 U/L    ALT 23 0 - 50 U/L    AST 17 0 - 45 U/L   CBC with platelets     Status: None   Result Value Ref Range    WBC 6.3 4.0 - 11.0 10e9/L    RBC Count 4.12 3.8 - 5.2 10e12/L    Hemoglobin 13.0 11.7 - 15.7 g/dL    Hematocrit 38.6 35.0 - 47.0 %    MCV 94 78 - 100 fl    MCH 31.6 26.5 - 33.0 pg    MCHC 33.7 31.5 - 36.5 g/dL    RDW 13.5 10.0 - 15.0 %    Platelet Count  241 150 - 450 10e9/L   Lipid panel reflex to direct LDL Fasting     Status: Abnormal   Result Value Ref Range    Cholesterol 239 (H) <200 mg/dL    Triglycerides 114 <150 mg/dL    HDL Cholesterol 78 >49 mg/dL    LDL Cholesterol Calculated 138 (H) <100 mg/dL    Non HDL Cholesterol 161 (H) <130 mg/dL

## 2020-10-30 LAB
ALBUMIN SERPL-MCNC: 3.9 G/DL (ref 3.4–5)
ALP SERPL-CCNC: 68 U/L (ref 40–150)
ALT SERPL W P-5'-P-CCNC: 23 U/L (ref 0–50)
ANION GAP SERPL CALCULATED.3IONS-SCNC: 4 MMOL/L (ref 3–14)
AST SERPL W P-5'-P-CCNC: 17 U/L (ref 0–45)
BILIRUB SERPL-MCNC: 1.4 MG/DL (ref 0.2–1.3)
BUN SERPL-MCNC: 17 MG/DL (ref 7–30)
CALCIUM SERPL-MCNC: 9.8 MG/DL (ref 8.5–10.1)
CHLORIDE SERPL-SCNC: 103 MMOL/L (ref 94–109)
CHOLEST SERPL-MCNC: 239 MG/DL
CO2 SERPL-SCNC: 29 MMOL/L (ref 20–32)
CREAT SERPL-MCNC: 0.89 MG/DL (ref 0.52–1.04)
GFR SERPL CREATININE-BSD FRML MDRD: 62 ML/MIN/{1.73_M2}
GLUCOSE SERPL-MCNC: 93 MG/DL (ref 70–99)
HDLC SERPL-MCNC: 78 MG/DL
LDLC SERPL CALC-MCNC: 138 MG/DL
NONHDLC SERPL-MCNC: 161 MG/DL
POTASSIUM SERPL-SCNC: 4.5 MMOL/L (ref 3.4–5.3)
PROT SERPL-MCNC: 7.4 G/DL (ref 6.8–8.8)
SODIUM SERPL-SCNC: 136 MMOL/L (ref 133–144)
TRIGL SERPL-MCNC: 114 MG/DL

## 2020-11-02 RX ORDER — ROSUVASTATIN CALCIUM 5 MG/1
5 TABLET, COATED ORAL DAILY
Qty: 90 TABLET | Refills: 3 | Status: SHIPPED | OUTPATIENT
Start: 2020-11-02 | End: 2022-01-12

## 2020-11-16 ENCOUNTER — TELEPHONE (OUTPATIENT)
Dept: CARDIOLOGY | Facility: CLINIC | Age: 80
End: 2020-11-16

## 2020-11-18 ENCOUNTER — HOSPITAL ENCOUNTER (OUTPATIENT)
Dept: CARDIOLOGY | Facility: CLINIC | Age: 80
Discharge: HOME | End: 2020-11-18
Attending: INTERNAL MEDICINE | Admitting: INTERNAL MEDICINE
Payer: COMMERCIAL

## 2020-11-18 DIAGNOSIS — Z98.890 S/P AORTIC DISSECTION REPAIR: ICD-10-CM

## 2020-11-18 LAB
CREAT BLD-MCNC: 1.1 MG/DL (ref 0.52–1.04)
GFR SERPL CREATININE-BSD FRML MDRD: 48 ML/MIN/{1.73_M2}

## 2020-11-18 PROCEDURE — 82565 ASSAY OF CREATININE: CPT

## 2020-11-18 PROCEDURE — 71275 CT ANGIOGRAPHY CHEST: CPT | Mod: 26 | Performed by: INTERNAL MEDICINE

## 2020-11-18 PROCEDURE — 74174 CTA ABD&PLVS W/CONTRAST: CPT | Mod: 26 | Performed by: INTERNAL MEDICINE

## 2020-11-18 PROCEDURE — 250N000011 HC RX IP 250 OP 636: Performed by: INTERNAL MEDICINE

## 2020-11-18 PROCEDURE — 258N000003 HC RX IP 258 OP 636: Performed by: INTERNAL MEDICINE

## 2020-11-18 PROCEDURE — 71275 CT ANGIOGRAPHY CHEST: CPT

## 2020-11-18 RX ORDER — DIPHENHYDRAMINE HCL 25 MG
25 CAPSULE ORAL
Status: DISCONTINUED | OUTPATIENT
Start: 2020-11-18 | End: 2020-11-19 | Stop reason: HOSPADM

## 2020-11-18 RX ORDER — IOPAMIDOL 755 MG/ML
500 INJECTION, SOLUTION INTRAVASCULAR ONCE
Status: COMPLETED | OUTPATIENT
Start: 2020-11-18 | End: 2020-11-18

## 2020-11-18 RX ORDER — ONDANSETRON 2 MG/ML
4 INJECTION INTRAMUSCULAR; INTRAVENOUS
Status: DISCONTINUED | OUTPATIENT
Start: 2020-11-18 | End: 2020-11-19 | Stop reason: HOSPADM

## 2020-11-18 RX ORDER — METHYLPREDNISOLONE SODIUM SUCCINATE 125 MG/2ML
125 INJECTION, POWDER, LYOPHILIZED, FOR SOLUTION INTRAMUSCULAR; INTRAVENOUS
Status: DISCONTINUED | OUTPATIENT
Start: 2020-11-18 | End: 2020-11-19 | Stop reason: HOSPADM

## 2020-11-18 RX ORDER — DIPHENHYDRAMINE HYDROCHLORIDE 50 MG/ML
25-50 INJECTION INTRAMUSCULAR; INTRAVENOUS
Status: DISCONTINUED | OUTPATIENT
Start: 2020-11-18 | End: 2020-11-19 | Stop reason: HOSPADM

## 2020-11-18 RX ORDER — ACYCLOVIR 200 MG/1
0-1 CAPSULE ORAL
Status: DISCONTINUED | OUTPATIENT
Start: 2020-11-18 | End: 2020-11-19 | Stop reason: HOSPADM

## 2020-11-18 RX ADMIN — IOPAMIDOL 80 ML: 755 INJECTION, SOLUTION INTRAVENOUS at 10:37

## 2020-11-18 RX ADMIN — SODIUM CHLORIDE 100 ML: 9 INJECTION, SOLUTION INTRAVENOUS at 10:37

## 2020-11-20 ENCOUNTER — TELEPHONE (OUTPATIENT)
Dept: CARDIOLOGY | Facility: CLINIC | Age: 80
End: 2020-11-20

## 2020-11-20 NOTE — TELEPHONE ENCOUNTER
Returned voicemail from [facility] clinical review nurse regarding case: [id number]. Confirmed the ordered CTA chest abdomen and pelvis for aortic dissection s/p repair. She states it appears numbers were transposed in their system for the CPT code, with one order approved and a second pending. They have all the information needed to route for approval. My direct contact information was provided should additional questions or concerns arise.    Izzy Perez RN  [facility]

## 2020-11-24 ENCOUNTER — VIRTUAL VISIT (OUTPATIENT)
Dept: CARDIOLOGY | Facility: CLINIC | Age: 80
End: 2020-11-24
Attending: INTERNAL MEDICINE
Payer: COMMERCIAL

## 2020-11-24 DIAGNOSIS — Z98.890 S/P AORTIC DISSECTION REPAIR: Primary | ICD-10-CM

## 2020-11-24 PROCEDURE — 99214 OFFICE O/P EST MOD 30 MIN: CPT | Mod: 95 | Performed by: INTERNAL MEDICINE

## 2020-11-24 NOTE — PROGRESS NOTES
"Karina Lopez is a 79 year old female who is being evaluated via a billable telephone visit.      The patient has been notified of following:     \"This telephone visit will be conducted via a call between you and your physician/provider. We have found that certain health care needs can be provided without the need for a physical exam.  This service lets us provide the care you need with a short phone conversation.  If a prescription is necessary we can send it directly to your pharmacy.  If lab work is needed we can place an order for that and you can then stop by our lab to have the test done at a later time.    Telephone visits are billed at different rates depending on your insurance coverage. During this emergency period, for some insurers they may be billed the same as an in-person visit.  Please reach out to your insurance provider with any questions.    If during the course of the call the physician/provider feels a telephone visit is not appropriate, you will not be charged for this service.\"    Patient has given verbal consent for Telephone visit?  Yes    What phone number would you like to be contacted at? [phone]    How would you like to obtain your AVS? MyChart    Phone call duration: 12 minutes    Haleigh Will MD     Review Of Systems  Skin: NEGATIVE  Eyes:Ears/Nose/Throat: NEGATIVE  Respiratory: NEGATIVE  Cardiovascular: Left arm numbness  Gastrointestinal: NEGATIVE  Genitourinary:NEGATIVE   Musculoskeletal: NEGATIVE  Neurologic: NEGATIVE  Psychiatric: NEGATIVE  Hematologic/Lymphatic/Immunologic: NEGATIVE  Endocrine:  NEGATIVE    Vital signs reported by patient  BP. 139/57   P. N/A  Wt. 126LB  Ht. 5'1\"  Ciera Jean Lpn      Cardiology      Patient is a very pleasant 79-year-old female who I am seeing on behalf of my colleague Dr. Jean-Baptiste who is not available.  She has a notable history of a type a dissection which she suffered in 2012 with emergent surgical repair.  Here for her " yearly visit.  She had a CTA performed prior to today's visit, the findings are as listed below.  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  Overall she has done well with no return of symptoms.  She in the past had some difficulty with simvastatin with myalgias however due to elevated cholesterol was started on Crestor which she is tolerating without difficulty at present.  This was recently started by her primary care physician.  Here for her yearly follow-up visit.      CTA  1.  The patient is status post graft repair of the ascending thoracic  aorta. The grafted portions of the ascending thoracic aorta appear  normal in caliber with no residual dissection.  2.  Type B aortic dissection noted in the infrarenal abdominal aorta  with extension into the proximal left common iliac artery.  3.  Moderate stenosis of the celiac trunk.  4.  Moderate to severe stenosis of the left renal artery.  5.  In comparison to the previous report dated 9/21/2018, the findings  appear similar.        GENERAL: Healthy, alert and no distress  EYES: Eyes grossly normal to inspection.  No discharge or erythema, or obvious scleral/conjunctival abnormalities.  RESP: No audible wheeze, cough, or visible cyanosis.  No visible retractions or increased work of breathing.    SKIN: Visible skin clear. No significant rash, abnormal pigmentation or lesions.  NEURO: Cranial nerves grossly intact.  Mentation and speech appropriate for age.  PSYCH: Mentation appears normal, affect normal/bright, judgement and insight intact, normal speech and appearance well-groomed.      Assessment and plan    Patient is overall doing well.  I will have her follow-up with her cardiology team next year.  We will order an echocardiogram prior to that visit.  Continue with her present medical therapy.  Blood pressure is well controlled    Haleigh Will MD

## 2020-11-24 NOTE — LETTER
"11/24/2020    Susan Pena PA-C  [address]  [place] [facility] [address]    RE: Karina Lopez       Dear Colleague,    I had the pleasure of seeing Karina Lopez in the [facility].    Karina Lopez is a 79 year old female who is being evaluated via a billable telephone visit.      The patient has been notified of following:     \"This telephone visit will be conducted via a call between you and your physician/provider. We have found that certain health care needs can be provided without the need for a physical exam.  This service lets us provide the care you need with a short phone conversation.  If a prescription is necessary we can send it directly to your pharmacy.  If lab work is needed we can place an order for that and you can then stop by our lab to have the test done at a later time.    Telephone visits are billed at different rates depending on your insurance coverage. During this emergency period, for some insurers they may be billed the same as an in-person visit.  Please reach out to your insurance provider with any questions.    If during the course of the call the physician/provider feels a telephone visit is not appropriate, you will not be charged for this service.\"    Patient has given verbal consent for Telephone visit?  Yes    What phone number would you like to be contacted at? [phone]    How would you like to obtain your AVS? MyChart    Phone call duration: 12 minutes    Haleigh Will MD     Review Of Systems  Skin: NEGATIVE  Eyes:Ears/Nose/Throat: NEGATIVE  Respiratory: NEGATIVE  Cardiovascular: Left arm numbness  Gastrointestinal: NEGATIVE  Genitourinary:NEGATIVE   Musculoskeletal: NEGATIVE  Neurologic: NEGATIVE  Psychiatric: NEGATIVE  Hematologic/Lymphatic/Immunologic: NEGATIVE  Endocrine:  NEGATIVE    Vital signs reported by patient  BP. 139/57   P. N/A  Wt. 126LB  Ht. 5'1\"  Ciera Jean Lpn      Cardiology      Patient is a " very pleasant 79-year-old female who I am seeing on behalf of my colleague Dr. Jean-Baptiste who is not available.  She has a notable history of a type a dissection which she suffered in 2012 with emergent surgical repair.  Here for her yearly visit.  She had a CTA performed prior to today's visit, the findings are as listed below.  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  Overall she has done well with no return of symptoms.  She in the past had some difficulty with simvastatin with myalgias however due to elevated cholesterol was started on Crestor which she is tolerating without difficulty at present.  This was recently started by her primary care physician.  Here for her yearly follow-up visit.      CTA  1.  The patient is status post graft repair of the ascending thoracic  aorta. The grafted portions of the ascending thoracic aorta appear  normal in caliber with no residual dissection.  2.  Type B aortic dissection noted in the infrarenal abdominal aorta  with extension into the proximal left common iliac artery.  3.  Moderate stenosis of the celiac trunk.  4.  Moderate to severe stenosis of the left renal artery.  5.  In comparison to the previous report dated 9/21/2018, the findings  appear similar.        GENERAL: Healthy, alert and no distress  EYES: Eyes grossly normal to inspection.  No discharge or erythema, or obvious scleral/conjunctival abnormalities.  RESP: No audible wheeze, cough, or visible cyanosis.  No visible retractions or increased work of breathing.    SKIN: Visible skin clear. No significant rash, abnormal pigmentation or lesions.  NEURO: Cranial nerves grossly intact.  Mentation and speech appropriate for age.  PSYCH: Mentation appears normal, affect normal/bright, judgement and insight intact, normal speech and appearance well-groomed.      Assessment and plan    Patient is overall doing well.  I will have her follow-up with her  cardiology team next year.  We will order an echocardiogram prior to that visit.  Continue with her present medical therapy.  Blood pressure is well controlled      Thank you for allowing me to participate in the care of your patient.    Sincerely,     Haleigh Will MD     [facility]

## 2021-02-01 ENCOUNTER — MYC MEDICAL ADVICE (OUTPATIENT)
Dept: FAMILY MEDICINE | Facility: CLINIC | Age: 81
End: 2021-02-01

## 2021-03-08 ENCOUNTER — HOSPITAL ENCOUNTER (OUTPATIENT)
Dept: MAMMOGRAPHY | Facility: CLINIC | Age: 81
Discharge: HOME | End: 2021-03-08
Attending: PHYSICIAN ASSISTANT | Admitting: PHYSICIAN ASSISTANT
Payer: COMMERCIAL

## 2021-03-08 DIAGNOSIS — Z12.31 VISIT FOR SCREENING MAMMOGRAM: ICD-10-CM

## 2021-03-08 PROCEDURE — 77063 BREAST TOMOSYNTHESIS BI: CPT

## 2021-06-01 DIAGNOSIS — F51.01 PRIMARY INSOMNIA: ICD-10-CM

## 2021-06-02 NOTE — TELEPHONE ENCOUNTER
Trazodone 50 mg tablets    Summary: TAKE 1 TABLET(50 MG) BY MOUTH EVERY NIGHT AS NEEDED FOR SLEEP, Disp-90 tablet, R-1, E-Prescribe  Profile only, fill when pt calls     Start: 10/29/2020  Ord/Sold: 10/29/2020

## 2021-06-03 RX ORDER — TRAZODONE HYDROCHLORIDE 50 MG/1
TABLET, FILM COATED ORAL
Qty: 90 TABLET | Refills: 0 | Status: SHIPPED | OUTPATIENT
Start: 2021-06-03 | End: 2021-07-19

## 2021-06-03 NOTE — TELEPHONE ENCOUNTER
"Requested Prescriptions   Pending Prescriptions Disp Refills     traZODone (DESYREL) 50 MG tablet 90 tablet 1     Sig: TAKE 1 TABLET(50 MG) BY MOUTH EVERY NIGHT AS NEEDED FOR SLEEP       Serotonin Modulators Passed - 6/3/2021 10:11 AM        Passed - Recent (12 mo) or future (30 days) visit within the authorizing provider's specialty     Patient has had an office visit with the authorizing provider or a provider within the authorizing providers department within the previous 12 mos or has a future within next 30 days. See \"Patient Info\" tab in inbasket, or \"Choose Columns\" in Meds & Orders section of the refill encounter.              Passed - Medication is active on med list        Passed - Patient is age 18 or older        Passed - No active pregnancy on record        Passed - No positive pregnancy test in past 12 months           "

## 2021-07-17 DIAGNOSIS — F51.01 PRIMARY INSOMNIA: ICD-10-CM

## 2021-07-19 RX ORDER — TRAZODONE HYDROCHLORIDE 50 MG/1
TABLET, FILM COATED ORAL
Qty: 90 TABLET | Refills: 0 | Status: SHIPPED | OUTPATIENT
Start: 2021-07-19 | End: 2022-01-03

## 2021-09-18 ENCOUNTER — HEALTH MAINTENANCE LETTER (OUTPATIENT)
Age: 81
End: 2021-09-18

## 2021-10-18 DIAGNOSIS — I10 HTN (HYPERTENSION), BENIGN: ICD-10-CM

## 2021-10-18 NOTE — TELEPHONE ENCOUNTER
Labetalol 200 mg tablets    Summary: Take 1 tablet (200 mg) by mouth 3 times daily, Disp-270 tablet, R-3, E-Prescribe   Dose, Route, Frequency: 200 mg, Oral, 3 TIMES DAILY  Start: 10/29/2020  Ord/Sold: 10/29/2020    LOV 10/29/2020    Losartan 50 mg tablets    Summary: Take 1 tablet (50 mg) by mouth daily, Disp-90 tablet, R-3, E-Prescribe  Profile only, fill when pt calls  Dose, Route, Frequency: 50 mg, Oral, DAILY  Start: 10/29/2020  Ord/Sold: 10/29/2020

## 2021-10-19 RX ORDER — LABETALOL 200 MG/1
200 TABLET, FILM COATED ORAL 3 TIMES DAILY
Qty: 90 TABLET | Refills: 0 | Status: SHIPPED | OUTPATIENT
Start: 2021-10-19 | End: 2021-11-29

## 2021-10-19 RX ORDER — LOSARTAN POTASSIUM 50 MG/1
50 TABLET ORAL DAILY
Qty: 30 TABLET | Refills: 0 | Status: SHIPPED | OUTPATIENT
Start: 2021-10-19 | End: 2022-01-25

## 2021-10-19 NOTE — TELEPHONE ENCOUNTER
Sent Tapatalk message advising pt due for appointment with Susan Pena    Routing refill request to provider for review/approval because:  Labs out of range:  Last creatinine 1.1    Izzy RABAGO, Triage RN  [facility]

## 2021-11-29 DIAGNOSIS — I10 HTN (HYPERTENSION), BENIGN: ICD-10-CM

## 2021-11-29 NOTE — TELEPHONE ENCOUNTER
LOV 10- Abigail  No future OV scheduled    Matilde x1 given, no response  MyChart sent 10- not read    TC - please call patient to schedule fasting annual exam (in person) with RT Terry (R)

## 2021-12-01 RX ORDER — LABETALOL 200 MG/1
200 TABLET, FILM COATED ORAL 3 TIMES DAILY
Qty: 90 TABLET | Refills: 0 | Status: SHIPPED | OUTPATIENT
Start: 2021-12-01 | End: 2022-01-05

## 2021-12-01 NOTE — TELEPHONE ENCOUNTER
TC - there are 2 C2C on file 3- for friend Ed and 3- Email authorization (unsure if this can be used).    Of note:  Insurance on record is [employer]age - is patient even able to come to our clinic/system?    Celi Patel RT (R)

## 2021-12-01 NOTE — TELEPHONE ENCOUNTER
No Appointment scheduled.    TC's Please call patient for appointment.    Thank you,    Alysa Valerio RN  [facility]

## 2021-12-01 NOTE — TELEPHONE ENCOUNTER
Pt has [employer]vantage, an insurance that [facility] does not take. Pt is unable to schedule an esteban't for her refills unless she changes insurance in 2022.

## 2021-12-01 NOTE — TELEPHONE ENCOUNTER
Routing refill request to provider for review/approval because:  Drug not on the [facility] refill protocol . Please read message regarding her insurance.     BP Readings from Last 3 Encounters:   10/29/20 122/63   11/19/19 131/56   07/30/19 105/52        Kristin Camarillo RN  [facility] Triage

## 2022-01-02 DIAGNOSIS — I10 HTN (HYPERTENSION), BENIGN: ICD-10-CM

## 2022-01-02 DIAGNOSIS — F51.01 PRIMARY INSOMNIA: ICD-10-CM

## 2022-01-03 NOTE — TELEPHONE ENCOUNTER
Chart currently show's [employer] as insurance, see 11/29/2021 refill - per TC she has not been able to come to clinic.    Has future appt scheduled - must have new insurance in 2022.      Appointments in Next Year    Feb 03, 2022  1:30 PM  (Arrive by 1:10 PM)  Annual Wellness Visit with Susan Pena PA-C  [facility] ([facility] ) [phone]   Feb 03, 2022  3:00 PM  ECHO COMPLETE with [id number]  [facility] ([facility] ) [phone]   Feb 09, 2022 10:15 AM  Return Visit with Kenn Jean-Baptiste MD  [facility] [place] ([facility] ) [phone]        Celi Patel RT (R)

## 2022-01-04 DIAGNOSIS — I10 HTN (HYPERTENSION), BENIGN: ICD-10-CM

## 2022-01-04 RX ORDER — LABETALOL 200 MG/1
TABLET, FILM COATED ORAL
Qty: 90 TABLET | Refills: 0 | OUTPATIENT
Start: 2022-01-04

## 2022-01-04 RX ORDER — TRAZODONE HYDROCHLORIDE 50 MG/1
TABLET, FILM COATED ORAL
Qty: 30 TABLET | Refills: 0 | Status: SHIPPED | OUTPATIENT
Start: 2022-01-04 | End: 2022-01-25

## 2022-01-04 NOTE — TELEPHONE ENCOUNTER
Per chart review rx was approved 12/01/2021. Triage tried calling [facility] pharmacy but unable on long hold time. Rx reordered today. Left voice message advising she schedule yearly exam before further refills.

## 2022-01-04 NOTE — TELEPHONE ENCOUNTER
Reason for Call:  Medication or medication refill:    Do you use a [facility]?  Name of the pharmacy and phone number for the current request:      [id number] - [place], [place] - [address] AT [facility] [address]      Name of the medication requested:     labetalol (NORMODYNE) 200 MG tablet 90 tablet       Other request: pt is totally out of this medication she has not had    Can we leave a detailed message on this number? YES    Phone number patient can be reached at: Cell number on file:    Telephone Information:   Mobile [phone]       Best Time: any    Call taken on 1/4/2022 at 3:29 PM by Eva Plaza

## 2022-01-04 NOTE — TELEPHONE ENCOUNTER
Medication is being filled for 1 time refill only due to:  Patient needs to be seen because it has been more than one year since last visit.    Routing to team to verify if pt has new insurance  Chantale SINGH RN, BSN

## 2022-01-05 RX ORDER — LABETALOL 200 MG/1
TABLET, FILM COATED ORAL
Qty: 90 TABLET | Refills: 0 | OUTPATIENT
Start: 2022-01-05

## 2022-01-05 RX ORDER — LABETALOL 200 MG/1
200 TABLET, FILM COATED ORAL 3 TIMES DAILY
Qty: 90 TABLET | Refills: 0 | Status: SHIPPED | OUTPATIENT
Start: 2022-01-05 | End: 2022-01-25

## 2022-01-05 NOTE — TELEPHONE ENCOUNTER
LABETALOL 200MG TABLETS          Will file in chart as: labetalol (NORMODYNE) 200 MG tablet    The original prescription was reordered on 1/5/2022 by Placido Ontiveros, RN. Renewing this prescription may not be appropriate.     Rx refused. Duplicate/early request. Pharmacy notified.  Cody Cardona RN  [facility]

## 2022-01-08 ENCOUNTER — HEALTH MAINTENANCE LETTER (OUTPATIENT)
Age: 82
End: 2022-01-08

## 2022-01-25 ENCOUNTER — OFFICE VISIT (OUTPATIENT)
Dept: FAMILY MEDICINE | Facility: CLINIC | Age: 82
End: 2022-01-25
Payer: COMMERCIAL

## 2022-01-25 VITALS
TEMPERATURE: 96.9 F | OXYGEN SATURATION: 97 % | BODY MASS INDEX: 23.6 KG/M2 | SYSTOLIC BLOOD PRESSURE: 137 MMHG | WEIGHT: 125 LBS | DIASTOLIC BLOOD PRESSURE: 54 MMHG | HEIGHT: 61 IN | RESPIRATION RATE: 16 BRPM | HEART RATE: 58 BPM

## 2022-01-25 DIAGNOSIS — Z23 NEED FOR PROPHYLACTIC VACCINATION AND INOCULATION AGAINST INFLUENZA: ICD-10-CM

## 2022-01-25 DIAGNOSIS — F51.01 PRIMARY INSOMNIA: ICD-10-CM

## 2022-01-25 DIAGNOSIS — E78.5 HYPERLIPIDEMIA LDL GOAL <100: ICD-10-CM

## 2022-01-25 DIAGNOSIS — Z00.00 MEDICARE ANNUAL WELLNESS VISIT, SUBSEQUENT: Primary | ICD-10-CM

## 2022-01-25 DIAGNOSIS — F32.0 MILD MAJOR DEPRESSION (H): ICD-10-CM

## 2022-01-25 DIAGNOSIS — G47.33 OSA (OBSTRUCTIVE SLEEP APNEA): ICD-10-CM

## 2022-01-25 DIAGNOSIS — N18.30 STAGE 3 CHRONIC KIDNEY DISEASE, UNSPECIFIED WHETHER STAGE 3A OR 3B CKD (H): ICD-10-CM

## 2022-01-25 DIAGNOSIS — Z78.0 POSTMENOPAUSAL STATUS: ICD-10-CM

## 2022-01-25 DIAGNOSIS — R41.3 MEMORY LOSS: ICD-10-CM

## 2022-01-25 DIAGNOSIS — Z98.890 HX OF REPAIR OF DISSECTING THORACIC AORTIC ANEURYSM, STANFORD TYPE A: ICD-10-CM

## 2022-01-25 DIAGNOSIS — I10 HTN (HYPERTENSION), BENIGN: ICD-10-CM

## 2022-01-25 DIAGNOSIS — Z86.79 HX OF REPAIR OF DISSECTING THORACIC AORTIC ANEURYSM, STANFORD TYPE A: ICD-10-CM

## 2022-01-25 LAB
ERYTHROCYTE [DISTWIDTH] IN BLOOD BY AUTOMATED COUNT: 13.2 % (ref 10–15)
HCT VFR BLD AUTO: 41.2 % (ref 35–47)
HGB BLD-MCNC: 13.5 G/DL (ref 11.7–15.7)
MCH RBC QN AUTO: 31.3 PG (ref 26.5–33)
MCHC RBC AUTO-ENTMCNC: 32.8 G/DL (ref 31.5–36.5)
MCV RBC AUTO: 96 FL (ref 78–100)
PLATELET # BLD AUTO: 234 10E3/UL (ref 150–450)
RBC # BLD AUTO: 4.31 10E6/UL (ref 3.8–5.2)
VIT B12 SERPL-MCNC: 479 PG/ML (ref 193–986)
WBC # BLD AUTO: 6.3 10E3/UL (ref 4–11)

## 2022-01-25 PROCEDURE — G0008 ADMIN INFLUENZA VIRUS VAC: HCPCS | Performed by: PHYSICIAN ASSISTANT

## 2022-01-25 PROCEDURE — 36415 COLL VENOUS BLD VENIPUNCTURE: CPT | Performed by: PHYSICIAN ASSISTANT

## 2022-01-25 PROCEDURE — 90662 IIV NO PRSV INCREASED AG IM: CPT | Performed by: PHYSICIAN ASSISTANT

## 2022-01-25 PROCEDURE — 80061 LIPID PANEL: CPT | Performed by: PHYSICIAN ASSISTANT

## 2022-01-25 PROCEDURE — 84443 ASSAY THYROID STIM HORMONE: CPT | Performed by: PHYSICIAN ASSISTANT

## 2022-01-25 PROCEDURE — 80053 COMPREHEN METABOLIC PANEL: CPT | Performed by: PHYSICIAN ASSISTANT

## 2022-01-25 PROCEDURE — G0439 PPPS, SUBSEQ VISIT: HCPCS | Performed by: PHYSICIAN ASSISTANT

## 2022-01-25 PROCEDURE — 85027 COMPLETE CBC AUTOMATED: CPT | Performed by: PHYSICIAN ASSISTANT

## 2022-01-25 PROCEDURE — 82607 VITAMIN B-12: CPT | Performed by: PHYSICIAN ASSISTANT

## 2022-01-25 RX ORDER — LABETALOL 200 MG/1
200 TABLET, FILM COATED ORAL 3 TIMES DAILY
Qty: 270 TABLET | Refills: 3 | Status: SHIPPED | OUTPATIENT
Start: 2022-01-25 | End: 2022-12-06

## 2022-01-25 RX ORDER — LOSARTAN POTASSIUM 50 MG/1
50 TABLET ORAL DAILY
Qty: 90 TABLET | Refills: 3 | Status: SHIPPED | OUTPATIENT
Start: 2022-01-25 | End: 2022-03-31

## 2022-01-25 RX ORDER — TRAZODONE HYDROCHLORIDE 50 MG/1
TABLET, FILM COATED ORAL
Qty: 90 TABLET | Refills: 1 | Status: SHIPPED | OUTPATIENT
Start: 2022-01-25 | End: 2022-07-12

## 2022-01-25 RX ORDER — AMLODIPINE BESYLATE 5 MG/1
TABLET ORAL
Qty: 90 TABLET | Refills: 3 | Status: SHIPPED | OUTPATIENT
Start: 2022-01-25 | End: 2023-02-23

## 2022-01-25 ASSESSMENT — ENCOUNTER SYMPTOMS
HEMATURIA: 0
MYALGIAS: 0
SORE THROAT: 0
FREQUENCY: 0
COUGH: 0
EYE PAIN: 0
ARTHRALGIAS: 0
JOINT SWELLING: 0
DYSURIA: 0
NAUSEA: 0
CHILLS: 0
DIZZINESS: 1
NERVOUS/ANXIOUS: 0
SHORTNESS OF BREATH: 0
BREAST MASS: 0
HEADACHES: 0
FEVER: 0
PALPITATIONS: 0
HEMATOCHEZIA: 0
CONSTIPATION: 0
HEARTBURN: 0
ABDOMINAL PAIN: 0
DIARRHEA: 0
WEAKNESS: 0
PARESTHESIAS: 0

## 2022-01-25 ASSESSMENT — MIFFLIN-ST. JEOR: SCORE: 971.44

## 2022-01-25 ASSESSMENT — PAIN SCALES - GENERAL: PAINLEVEL: NO PAIN (0)

## 2022-01-25 ASSESSMENT — ACTIVITIES OF DAILY LIVING (ADL): CURRENT_FUNCTION: NO ASSISTANCE NEEDED

## 2022-01-25 NOTE — PROGRESS NOTES
"SUBJECTIVE:   Karina Lopez is a 81 year old female who presents for Preventive Visit.    Pt due for flu shot and Shingrex  Mammogram due after March 8, 2022  Last dexa done 2017    She is more forgetful but continues to drive and manage her own finances  She is more tired than usual but thinks that it is from not being active  [facility] [employer] [facility] since enjoys those.    She is not drinking etoh this month (dry January)  She was drinking up to 3 drinks per day  She is sleeping better without etoh    She ran out of crestor a few weeks ago so worried that her chol will be high.        Patient has been advised of split billing requirements and indicates understanding: Yes  Are you in the first 12 months of your Medicare coverage?  No    Healthy Habits:     In general, how would you rate your overall health?  Good    Frequency of exercise:  1 day/week    Duration of exercise:  Less than 15 minutes    Do you usually eat at least 4 servings of fruit and vegetables a day, include whole grains    & fiber and avoid regularly eating high fat or \"junk\" foods?  Yes    Taking medications regularly:  Yes    Barriers to taking medications:  None    Medication side effects:  None    Ability to successfully perform activities of daily living:  No assistance needed    Home Safety:  No safety concerns identified    Hearing Impairment:  No hearing concerns    In the past 6 months, have you been bothered by leaking of urine?  No    In general, how would you rate your overall mental or emotional health?  Good      PHQ-2 Total Score: 0    Additional concerns today:  No    Do you feel safe in your environment? Yes    Have you ever done Advance Care Planning? (For example, a Health Directive, POLST, or a discussion with a medical provider or your loved ones about your wishes): Yes, patient states has an Advance Care Planning document and will bring a copy to the clinic.       Fall risk  Fallen 2 or more " times in the past year?: No  Any fall with injury in the past year?: No    Cognitive Screening   1) Repeat 3 items (Leader, Season, Table)    2) Clock draw: NORMAL  3) 3 item recall: Recalls 1 object   Results: NORMAL clock, 1-2 items recalled: COGNITIVE IMPAIRMENT LESS LIKELY    Mini-CogTM Copyright STAR Ibrahim. Licensed by the author for use in [facility]; reprinted with permission (shavon@[facility]). All rights reserved.      Do you have sleep apnea, excessive snoring or daytime drowsiness?: yes    Reviewed and updated as needed this visit by clinical staff  Tobacco  Allergies  Meds             Reviewed and updated as needed this visit by Provider               Social History     Tobacco Use     Smoking status: Former Smoker     Quit date: 1974     Years since quittin.0     Smokeless tobacco: Never Used     Tobacco comment: light smoker    Substance Use Topics     Alcohol use: Yes     Alcohol/week: 3.0 standard drinks     Types: 3 Glasses of wine per week     Comment: I glass wine with dinner     If you drink alcohol do you typically have >3 drinks per day or >7 drinks per week? No    Alcohol Use 2022   Prescreen: >3 drinks/day or >7 drinks/week? No   Prescreen: >3 drinks/day or >7 drinks/week? -       Current providers sharing in care for this patient include:   Patient Care Team:  Susan Pena PA-C as PCP - General (Internal Medicine)  Susan Pena PA-C as Assigned PCP  Ricky Puri MD as MD (Cardiology)  Haleigh Will MD as Assigned Heart and Vascular Provider    The following health maintenance items are reviewed in Epic and correct as of today:  Health Maintenance Due   Topic Date Due     ANNUAL REVIEW OF  ORDERS  Never done     HEPATITIS C SCREENING  Never done     ZOSTER IMMUNIZATION (2 of 3) 2014     MICROALBUMIN  2019     INFLUENZA VACCINE (1) 2021     BMP  10/29/2021     LIPID  10/29/2021     FALL RISK ASSESSMENT  10/29/2021      HEMOGLOBIN  10/29/2021             Pertinent mammograms are reviewed under the imaging tab.    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     +fibromuscular dysplasia     Hyperlipidemia LDL goal < 130      Insomnia      ASH (obstructive sleep apnea)(mild AHI=14) 04/12/2015     Osteoporosis, postmenopausal      Past Surgical History:   Procedure Laterality Date     CARDIAC SURGERY       HC OPEN TX METATARSAL FRACTURE       S/p thoracic aortic aneurysm repair  10/21/12     VASCULAR SURGERY       Current Outpatient Medications   Medication Sig Dispense Refill     Acetaminophen (TYLENOL PO) Take 500 mg by mouth every 4 hours as needed       amLODIPine (NORVASC) 5 MG tablet TAKE 1 TABLET(5 MG) BY MOUTH DAILY 90 tablet 3     Ascorbic Acid (VITAMIN C PO) Take 500 mg by mouth daily       calcium carbonate (OS-ALEXI 500 MG [place]. [place]) 500 MG tablet Take 500 mg by mouth daily       cholecalciferol (VITAMIN D3) 1000 UNITS capsule Take 1 capsule by mouth daily.       labetalol (NORMODYNE) 200 MG tablet Take 1 tablet (200 mg) by mouth 3 times daily - overdue for fasting annual exam 90 tablet 0     losartan (COZAAR) 50 MG tablet Take 1 tablet (50 mg) by mouth daily 30 tablet 0     MAGNESIUM OXIDE PO Take 200 mg by mouth daily Reported on 4/19/2017       rosuvastatin (CRESTOR) 5 MG tablet TAKE 1 TABLET(5 MG) BY MOUTH DAILY (Patient taking differently: Take 5 mg by mouth every other day ) 90 tablet 0     traZODone (DESYREL) 50 MG tablet TAKE 1 TABLET(50 MG) BY MOUTH EVERY NIGHT AS NEEDED FOR SLEEP 30 tablet 0         Review of Systems  Constitutional, HEENT, cardiovascular, pulmonary, GI, ,  "musculoskeletal, neuro, skin, endocrine and psych systems are negative, except as otherwise noted.    OBJECTIVE:   /54 (BP Location: Right arm, Patient Position: Sitting, Cuff Size: Adult Regular)   Pulse 58   Temp 96.9  F (36.1  C) (Temporal)   Resp 16   Ht 1.537 m (5' 0.5\")   Wt 56.7 kg (125 lb)   SpO2 97%   BMI 24.01 kg/m   Estimated body mass index is 24.01 kg/m  as calculated from the following:    Height as of this encounter: 1.537 m (5' 0.5\").    Weight as of this encounter: 56.7 kg (125 lb).  Physical Exam  GENERAL APPEARANCE: healthy, alert and no distress  EYES: Eyes grossly normal to inspection, PERRL and conjunctivae and sclerae normal  HENT: ear canals and TM's normal, nose and mouth without ulcers or lesions, oropharynx clear and oral mucous membranes moist  NECK: no adenopathy, no asymmetry, masses, or scars and thyroid normal to palpation  RESP: lungs clear to auscultation - no rales, rhonchi or wheezes  BREAST: normal without masses, tenderness or nipple discharge and no palpable axillary masses or adenopathy  CV: regular rate and rhythm, normal S1 S2, no S3 or S4, no murmur, click or rub, no peripheral edema and peripheral pulses strong  ABDOMEN: soft, nontender, no hepatosplenomegaly, no masses and bowel sounds normal  MS: no musculoskeletal defects are noted and gait is age appropriate without ataxia  SKIN: no suspicious lesions or rashes  NEURO: Normal strength and tone, sensory exam grossly normal, mentation intact and speech normal  PSYCH: mentation appears normal and affect normal/bright        ASSESSMENT / PLAN:   Assessment and Plan:     (Z00.00) Medicare annual wellness visit, subsequent  (primary encounter diagnosis)  Comment: recd she call insurance re: shingrex.  Flu shot given today  Plan: CBC with platelets        Mammogram and dexa due in march; gave her number to dall    (I10) HTN (hypertension), benign  Comment: well controlled,  Plan: losartan (COZAAR) 50 MG tablet, " "Comprehensive         metabolic panel (BMP + Alb, Alk Phos, ALT, AST,        Total. Bili, TP), amLODIPine (NORVASC) 5 MG         tablet, labetalol (NORMODYNE) 200 MG tablet            (F32.0) Mild major depression (H)  Comment:   Plan: stable.    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  Comment:   Plan: has f/u next month for echo and cards    (E78.5) Hyperlipidemia LDL goal <100  Comment:   Plan: Lipid panel reflex to direct LDL Fasting            (G47.33) ASH (obstructive sleep apnea)(mild AHI=14)  Comment:   Plan:     (Z78.0) Postmenopausal status  Comment:   Plan: DX Hip/Pelvis/Spine            (F51.01) Primary insomnia  Comment:   Plan: traZODone (DESYREL) 50 MG tablet        Refilled    (Z23) Need for prophylactic vaccination and inoculation against influenza  Comment:   Plan: INFLUENZA, QUAD, HIGH DOSE, PF, 65YR + (FLUZONE        HD), ADMIN INFLUENZA (For MEDICARE Patients         ONLY) []                Patient has been advised of split billing requirements and indicates understanding: Yes    COUNSELING:  Reviewed preventive health counseling, as reflected in patient instructions    Estimated body mass index is 24.01 kg/m  as calculated from the following:    Height as of this encounter: 1.537 m (5' 0.5\").    Weight as of this encounter: 56.7 kg (125 lb).        She reports that she quit smoking about 48 years ago. She has never used smokeless tobacco.      Appropriate preventive services were discussed with this patient, including applicable screening as appropriate for cardiovascular disease, diabetes, osteopenia/osteoporosis, and glaucoma.  As appropriate for age/gender, discussed screening " for colorectal cancer, prostate cancer, breast cancer, and cervical cancer. Checklist reviewing preventive services available has been given to the patient.    Reviewed patients plan of care and provided an AVS. The Basic Care Plan (routine screening as documented in Health Maintenance) for Karina meets the Care Plan requirement. This Care Plan has been established and reviewed with the Patient.    Counseling Resources:  ATP IV Guidelines  Pooled Cohorts Equation Calculator  Breast Cancer Risk Calculator  Breast Cancer: Medication to Reduce Risk  FRAX Risk Assessment  ICSI Preventive Guidelines  Dietary Guidelines for Americans, 2010  USDA's MyPlate  ASA Prophylaxis  Lung CA Screening    [facility]    Identified Health Risks:

## 2022-01-25 NOTE — PATIENT INSTRUCTIONS
Call insurance regarding Shingrex (shingles vaccine)    Mammogram due after March 8,  Call [phone]

## 2022-01-26 LAB
ALBUMIN SERPL-MCNC: 4 G/DL (ref 3.4–5)
ALP SERPL-CCNC: 75 U/L (ref 40–150)
ALT SERPL W P-5'-P-CCNC: 26 U/L (ref 0–50)
ANION GAP SERPL CALCULATED.3IONS-SCNC: 2 MMOL/L (ref 3–14)
AST SERPL W P-5'-P-CCNC: 15 U/L (ref 0–45)
BILIRUB SERPL-MCNC: 1 MG/DL (ref 0.2–1.3)
BUN SERPL-MCNC: 20 MG/DL (ref 7–30)
CALCIUM SERPL-MCNC: 9.3 MG/DL (ref 8.5–10.1)
CHLORIDE BLD-SCNC: 99 MMOL/L (ref 94–109)
CHOLEST SERPL-MCNC: 207 MG/DL
CO2 SERPL-SCNC: 31 MMOL/L (ref 20–32)
CREAT SERPL-MCNC: 0.98 MG/DL (ref 0.52–1.04)
FASTING STATUS PATIENT QL REPORTED: YES
GFR SERPL CREATININE-BSD FRML MDRD: 58 ML/MIN/1.73M2
GLUCOSE BLD-MCNC: 85 MG/DL (ref 70–99)
HDLC SERPL-MCNC: 84 MG/DL
LDLC SERPL CALC-MCNC: 109 MG/DL
NONHDLC SERPL-MCNC: 123 MG/DL
POTASSIUM BLD-SCNC: 4.9 MMOL/L (ref 3.4–5.3)
PROT SERPL-MCNC: 7.4 G/DL (ref 6.8–8.8)
SODIUM SERPL-SCNC: 132 MMOL/L (ref 133–144)
TRIGL SERPL-MCNC: 69 MG/DL
TSH SERPL DL<=0.005 MIU/L-ACNC: 2 MU/L (ref 0.4–4)

## 2022-01-29 DIAGNOSIS — I10 HTN (HYPERTENSION), BENIGN: ICD-10-CM

## 2022-01-29 DIAGNOSIS — F51.01 PRIMARY INSOMNIA: ICD-10-CM

## 2022-01-30 NOTE — RESULT ENCOUNTER NOTE
It was a pleasure seeing you for your physical examination.  I wanted to get back to you with your test results.  I have enclosed a copy for your review.      I am happy to report that your CBC or complete blood count is normal with no signs of anemia, leukemia or platelet abnormalities. Your chemistry panel shows no signs of diabetes.  Your blood salts, kidney tests, liver tests, and proteins are all fine.  Your sodium was just a little low so we will keep an eye on that.    Your thyroid and vitamin B12 levels were normal.    Your total cholesterol is 207 with the normal range being below 200.  Your HDL or good cholesterol is 84 with the normal range being above 50.  Your LDL or bad cholesterol is 109 with the normal range being below 100. Not too bad for being off of the crestor, but make sure you get back on that.      Susan Pena PA-C

## 2022-02-01 RX ORDER — TRAZODONE HYDROCHLORIDE 50 MG/1
TABLET, FILM COATED ORAL
Qty: 30 TABLET | OUTPATIENT
Start: 2022-02-01

## 2022-02-01 RX ORDER — AMLODIPINE BESYLATE 5 MG/1
TABLET ORAL
Qty: 90 TABLET | Refills: 3 | OUTPATIENT
Start: 2022-02-01

## 2022-02-01 NOTE — TELEPHONE ENCOUNTER
Chief Complaint   Patient presents with     Sinus Problem     began as a cold 4 days ago; pressure around forehead     Throat Problem     began as scratchy throat; feels tight in lower throat     Fatigue     Nasal Congestion     very congested, cannot breath through nose     Cough     Ent Problem     pain, pressure; both ears     SUBJECTIVE:  Nithya Tolentino is a 43 year old female here with concerns about sinus infection.  She states onset of symptoms was 4 days ago with cold symptoms that seemed to be improving but then symptoms significantly worsened yesterday.    Course of illness is worsening.   Severity moderate  She has had maxillary pressure as well as scratchy throat, fatigue, nasal congestion, ear pain, pressure bilaterally and post nasal drip with a cough.  Predisposing factors include environmental allergies, history of sinusitis.   Recent treatment has included: Antihistamine, Decongestants and Steroid nasal spray    Past Medical History:   Diagnosis Date     Dysmenorrhea      Environmental allergies      Current Outpatient Prescriptions   Medication Sig Dispense Refill     Phenyleph-Doxylamine-DM-APAP (MONSERRAT SELTZER PLUS PO)        predniSONE (DELTASONE) 20 MG tablet Take 2 tablets (40 mg) by mouth daily for 5 days 10 tablet 0     [START ON 10/8/2017] doxycycline (VIBRA-TABS) 100 MG tablet Take 1 tablet (100 mg) by mouth 2 times daily for 7 days 14 tablet 0     calcium-magnesium (CALMAG) 500-250 MG TABS per tablet Take 1 tablet by mouth daily       norethindrone-ethinyl estradiol (JUNEL FE 1/20) 1-20 MG-MCG per tablet Take 1 tablet by mouth daily 84 tablet 3     naproxen (NAPROSYN) 500 MG tablet Take 1 tablet (500 mg) by mouth 2 times daily as needed for moderate pain 60 tablet 3     mometasone (NASONEX) 50 MCG/ACT spray Spray 2 sprays into both nostrils daily 1 Box 9     fexofenadine (ALLEGRA) 180 MG tablet Take 1 tablet (180 mg) by mouth daily 30 tablet 1     cyanocobalamin (VITAMIN  B-12) 1000 MCG  Refused to Pharmacy.  This is a duplicate refill request already in epic, or with refills on file at the pharmacy.  Mariel Rios RN   tablet Take 1,000 mcg by mouth daily       acetaminophen (TYLENOL) 500 MG tablet Take 1-2 tablets by mouth every 6 hours as needed. 2 tablets before bedtime       Ferrous Sulfate (IRON SUPPLEMENT PO) Take 65 mg by mouth daily Reported on 3/6/2017       Social History   Substance Use Topics     Smoking status: Never Smoker     Smokeless tobacco: Never Used     Alcohol use Yes      Comment: occ     Allergies   Allergen Reactions     Augmentin Nausea and Vomiting     ROS:  Review of systems negative except as stated above.    OBJECTIVE:  /62 (BP Location: Left arm)  Pulse 64  Temp 98  F (36.7  C) (Oral)  LMP 09/15/2017 (Exact Date)  SpO2 98%  GENERAL APPEARANCE: healthy, alert and no distress  EYES: PERRL, conjunctiva clear  HENT: Pain with palpation over frontal and maxillary sinuses. Ear canals normal TMs with mild serous effusions bilaterally. Nasal turbinates edematous and boggy bilaterally. Posterior pharynx is not erythematous.  NECK: supple, nontender, no lymphadenopathy  RESP: lungs clear to auscultation - no rales, rhonchi or wheezes  CV: regular rates and rhythm, normal S1 S2, no murmur noted    ASSESSMENT:    ICD-10-CM    1. Acute sinusitis with coexisting condition, need prophylactic treatment J01.90 predniSONE (DELTASONE) 20 MG tablet     doxycycline (VIBRA-TABS) 100 MG tablet     PLAN:   Patient Instructions   Your symptoms appear to be viral at this time.  Secondary bacterial infections only happen in about 0.5-2% of cases.  Antibiotics are recommended only if you do not have any relief from your symptoms in 10 days or symptoms worsen after 7 days.  Nasal congestion often starts clear then turns yellow or green towards the end- this is not a sign of a bacterial infection.  Doxycycline twice daily for 7 days- start this if your symptoms worsen in 3 days or do not improve in 6 days.  -stop calcium/magnesium while taking Doxycycline    Prednisone 40 mg daily for 5 days.  Continue Nasonex 2 sprays  in each nostril daily until symptoms resolve, then continue 1 spray in each nostril for at least 5 more days.  Mucinex D every 12 hours.  Allegra in the morning, Benadryl (diaphenhydramine) in the evening.  Afrin (oxymetazoline) nasal spray twice daily for 3 days. Stop after 3 days.  Take Tylenol or an NSAID such as ibuprofen or naproxen as needed for pain.  Sit in the bathroom with the door closed and hot shower running to loosen mucus.  Contact primary care clinic if you do not have any relief from your symptoms after 10 days.  Present to emergency room for significantly increasing pain, persistent high fever >102F, swelling/redness around your eyes, changes in your vision or ability to move your eyes, altered mental status or a severe headache.    Follow up with primary care provider with any problems, questions or concerns or if symptoms worsen or fail to improve. Patient agreed to plan and verbalized understanding.    Chandni Kaur PA-C  [facility]

## 2022-02-03 ENCOUNTER — HOSPITAL ENCOUNTER (OUTPATIENT)
Dept: CARDIOLOGY | Facility: CLINIC | Age: 82
Discharge: HOME | End: 2022-02-03
Attending: PHYSICIAN ASSISTANT | Admitting: PHYSICIAN ASSISTANT
Payer: COMMERCIAL

## 2022-02-03 DIAGNOSIS — Z98.890 S/P AORTIC DISSECTION REPAIR: ICD-10-CM

## 2022-02-03 LAB — LVEF ECHO: NORMAL

## 2022-02-03 PROCEDURE — 93306 TTE W/DOPPLER COMPLETE: CPT

## 2022-02-03 PROCEDURE — 93306 TTE W/DOPPLER COMPLETE: CPT | Mod: 26 | Performed by: INTERNAL MEDICINE

## 2022-02-09 ENCOUNTER — OFFICE VISIT (OUTPATIENT)
Dept: CARDIOLOGY | Facility: CLINIC | Age: 82
End: 2022-02-09
Payer: COMMERCIAL

## 2022-02-09 VITALS
SYSTOLIC BLOOD PRESSURE: 101 MMHG | WEIGHT: 124.6 LBS | HEART RATE: 60 BPM | BODY MASS INDEX: 23.53 KG/M2 | OXYGEN SATURATION: 95 % | HEIGHT: 61 IN | DIASTOLIC BLOOD PRESSURE: 65 MMHG

## 2022-02-09 DIAGNOSIS — Z86.79 H/O AORTIC DISSECTION: Primary | ICD-10-CM

## 2022-02-09 PROCEDURE — 99214 OFFICE O/P EST MOD 30 MIN: CPT | Performed by: INTERNAL MEDICINE

## 2022-02-09 ASSESSMENT — MIFFLIN-ST. JEOR: SCORE: 969.62

## 2022-02-09 NOTE — PROGRESS NOTES
REASON FOR VISIT:  Followup for type A aortic dissection, status post repair.      PRIMARY CARE PROVIDER:  Susan Pena.      HISTORY OF PRESENT ILLNESS:  I again had the pleasure of seeing Karina Lopez at the [facility] in [place] this afternoon.  She is a very pleasant 81-year-old female with history of type A aortic dissection status post repair, hyperlipidemia and hypertension.      In 10/2012 the patient developed acute, tearing chest pain while flying to [place], [place].  At the time, she was working as a .  She was rushed to a local hospital, where she was diagnosed with acute type A aortic dissection.  She underwent emergent repair of the ascending aorta and resuspension of the aortic valve.  Postoperatively, she developed acute renal failure which resolved conservatively.  The aortic dissection extended down to the left external iliac artery.  Luckily, she did not have any blood flow compromised to her visceral organs or spinal cord.  All of the blood vessels to her visceral organs came off the true lumen.  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  Subsequent imaging demonstrated atrophic left kidney with mild perfusion abnormality.  She was seen by Nephrology, and they did not recommend an intervention with regard to the left renal artery stenosis.      Over the years, she has had multiple CTAs.  The most recent CTA performed on November of 2020 showed patent aortic arch vessels as well as interval complete resolution of the false lumen within the thoracic aorta.  The left renal artery appears still pinched by the false lumen.  Overall, the CTA findings remained stable.      She was seen by one of my partners last year. Overall she has done fairly well over the past year.  She has not had any symptoms including back pain or abdominal pain.  She had transthoracic echocardiogram last week which I  personally reviewed.  She also had laboratory studies including lipid profile which I reviewed.  Her LV function remains preserved.  She continues to have stable moderate aortic regurgitation.  Her lipid profile showed an LDL of 109.  She remains on rosuvastatin 5 mg daily    IMPRESSION, REPORT AND PLAN:   1.  History of type A aortic dissection, status post repair with resuspension of the aortic valve.   2.  Moderate aortic valve regurgitation.   3.  Hypertension.   4.  Hyperlipidemia.        Ms. Lopez continues to do well from a cardiovascular standpoint.  Her last CTA showed stable findings.  She currently does not endorse any symptoms.  Her blood pressure is well controlled with the current medical program. We will obtain CTA of the chest, abdomen and pelvis next year and see her for follow up afterwards. In the interim, if she develops any symptoms, she will communicate with us.     I asked to her watch her diet and weight.      I appreciate the opportunity to be part of this patient's care.          RAMAKRISHNA DIAZ MD        Orders Placed This Encounter   Procedures     CTA Chest Abdomen Pelvis w Contrast     Follow-Up with Cardiology       No orders of the defined types were placed in this encounter.      There are no discontinued medications.      Encounter Diagnosis   Name Primary?     H/O aortic dissection Yes       CURRENT MEDICATIONS:  Current Outpatient Medications   Medication Sig Dispense Refill     amLODIPine (NORVASC) 5 MG tablet TAKE 1 TABLET(5 MG) BY MOUTH DAILY 90 tablet 3     Ascorbic Acid (VITAMIN C PO) Take 500 mg by mouth daily       calcium carbonate (OS-ALEXI 500 MG [place]. CA) 500 MG tablet Take 500 mg by mouth daily       cholecalciferol (VITAMIN D3) 1000 UNITS capsule Take 1 capsule by mouth daily.       labetalol (NORMODYNE) 200 MG tablet Take 1 tablet (200 mg) by mouth 3 times daily - overdue for fasting annual exam 270 tablet 3     losartan (COZAAR) 50 MG tablet Take 1 tablet (50 mg) by  mouth daily 90 tablet 3     MAGNESIUM OXIDE PO Take 200 mg by mouth daily Reported on 4/19/2017       rosuvastatin (CRESTOR) 5 MG tablet TAKE 1 TABLET(5 MG) BY MOUTH DAILY (Patient taking differently: Take 5 mg by mouth daily ) 90 tablet 0     traZODone (DESYREL) 50 MG tablet TAKE 1 TABLET(50 MG) BY MOUTH EVERY NIGHT AS NEEDED FOR SLEEP 90 tablet 1     Acetaminophen (TYLENOL PO) Take 500 mg by mouth every 4 hours as needed (Patient not taking: Reported on 2/9/2022)         ALLERGIES     Allergies   Allergen Reactions     Lisinopril      Cough       Simvastatin      myalgias       PAST MEDICAL HISTORY:  Past Medical History:   Diagnosis Date     Anemia      Aortic valve insufficiency, etiology of cardiac valve disease unspecified 04/13/2016     Best vitelliform macular dystrophy      CKD (chronic kidney disease) stage 3, GFR 30-59 ml/min (H) 12/09/2015     Former smoker      HTN (hypertension), benign      Hx of repair of dissecting thoracic aortic aneurysm, [place] type A     +fibromuscular dysplasia     Hyperlipidemia LDL goal < 130      Insomnia      ASH (obstructive sleep apnea)(mild AHI=14) 04/12/2015     Osteoporosis, postmenopausal        PAST SURGICAL HISTORY:  Past Surgical History:   Procedure Laterality Date     CARDIAC SURGERY       HC OPEN TX METATARSAL FRACTURE       S/p thoracic aortic aneurysm repair  10/21/12     VASCULAR SURGERY         FAMILY HISTORY:  Family History   Problem Relation Age of Onset     Hypertension Father         best syndrome     Cancer Father         lung     Hypertension Paternal Grandmother         stroke     Alcohol/Drug Brother         best syndr       SOCIAL HISTORY:  Social History     Socioeconomic History     Marital status: Single     Spouse name: None     Number of children: None     Years of education: None     Highest education level: None   Occupational History     None   Tobacco Use     Smoking status: Former Smoker     Quit date: 1/1/1974     Years since quitting:  "48.1     Smokeless tobacco: Never Used     Tobacco comment: light smoker    Substance and Sexual Activity     Alcohol use: Yes     Alcohol/week: 3.0 standard drinks     Types: 3 Glasses of wine per week     Comment: I glass wine with dinner     Drug use: No     Sexual activity: Yes     Partners: Male   Other Topics Concern     Parent/sibling w/ CABG, MI or angioplasty before 65F 55M? No      Service Not Asked     Blood Transfusions Not Asked     Caffeine Concern Yes     Comment: 2 cups  a day     Occupational Exposure Not Asked     Hobby Hazards Not Asked     Sleep Concern Not Asked     Stress Concern Not Asked     Weight Concern Not Asked     Special Diet Yes     Back Care Not Asked     Exercise Yes     Comment: go to the [employer]     Bike Helmet Not Asked     Seat Belt Not Asked     Self-Exams Not Asked   Social History Narrative    Working as a  PT.     Retired July 2014.     Social Determinants of Health     Financial Resource Strain: Not on file   Food Insecurity: Not on file   Transportation Needs: Not on file   Physical Activity: Not on file   Stress: Not on file   Social Connections: Not on file   Intimate Partner Violence: Not on file   Housing Stability: Not on file       Review of Systems:  Skin:  Negative       Eyes:  Positive for glasses    ENT:  Negative      Respiratory:  Negative cough;shortness of breath     Cardiovascular:    Positive for;dizziness mild dizziness when getting up to fast  Gastroenterology: Negative heartburn;reflux    Genitourinary:  Negative      Musculoskeletal:  Negative      Neurologic:  Negative numbness or tingling of hands;numbness or tingling of feet    Psychiatric:  Negative      Heme/Lymph/Imm:  Negative      Endocrine:  Negative thyroid disorder;diabetes      Physical Exam:  Vitals: /65   Pulse 60   Ht 1.537 m (5' 0.5\")   Wt 56.5 kg (124 lb 9.6 oz)   SpO2 95%   BMI 23.93 kg/m      Constitutional:  in no acute distress        Skin:  warm and dry " to the touch, no apparent skin lesions or masses noted          Head:  normocephalic;no masses or lesions        Eyes:  conjunctivae and lids unremarkable        Lymph:      ENT:  dentition good;no pallor or cyanosis        Neck:  JVP normal;no carotid bruit;carotid pulses are full and equal bilaterally        Respiratory:  clear to auscultation         Cardiac: regular rhythm             Normal S1, S2, 1/6 ELISEO, faint holodiastolic murmur at the LUSB, no rubs or gallops                                      2+ bilateral femoral, dp, radial and carotid    GI:  abdomen soft;no bruits        Extremities and Muscular Skeletal:  no edema;no deformities, clubbing, cyanosis, erythema observed              Neurological:  no gross motor deficits        Psych:  Alert and Oriented x 3        CC  No referring provider defined for this encounter.

## 2022-02-09 NOTE — LETTER
2/9/2022    Susan Pena, SHEILA  [address] [facility] [address]  [facility] [address]    RE: Karina E Jessica       Dear Colleague,     I had the pleasure of seeing Karina Lopez in the [facility].  REASON FOR VISIT:  Followup for type A aortic dissection, status post repair.      PRIMARY CARE PROVIDER:  Susan Pena.      HISTORY OF PRESENT ILLNESS:  I again had the pleasure of seeing Karina Lopez at the [facility] in [place] this afternoon.  She is a very pleasant 81-year-old female with history of type A aortic dissection status post repair, hyperlipidemia and hypertension.      In 10/2012 the patient developed acute, tearing chest pain while flying to [place], [place].  At the time, she was working as a .  She was rushed to a local hospital, where she was diagnosed with acute type A aortic dissection.  She underwent emergent repair of the ascending aorta and resuspension of the aortic valve.  Postoperatively, she developed acute renal failure which resolved conservatively.  The aortic dissection extended down to the left external iliac artery.  Luckily, she did not have any blood flow compromised to her visceral organs or spinal cord.  All of the blood vessels to her visceral organs came off the true lumen.  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  Subsequent imaging demonstrated atrophic left kidney with mild perfusion abnormality.  She was seen by Nephrology, and they did not recommend an intervention with regard to the left renal artery stenosis.      Over the years, she has had multiple CTAs.  The most recent CTA performed on November of 2020 showed patent aortic arch vessels as well as interval complete resolution of the false lumen within the thoracic aorta.  The left renal artery appears still pinched by the false lumen.  Overall, the CTA findings remained stable.      She was seen by  one of my partners last year. Overall she has done fairly well over the past year.  She has not had any symptoms including back pain or abdominal pain.  She had transthoracic echocardiogram last week which I personally reviewed.  She also had laboratory studies including lipid profile which I reviewed.  Her LV function remains preserved.  She continues to have stable moderate aortic regurgitation.  Her lipid profile showed an LDL of 109.  She remains on rosuvastatin 5 mg daily    IMPRESSION, REPORT AND PLAN:   1.  History of type A aortic dissection, status post repair with resuspension of the aortic valve.   2.  Moderate aortic valve regurgitation.   3.  Hypertension.   4.  Hyperlipidemia.        Ms. Lopez continues to do well from a cardiovascular standpoint.  Her last CTA showed stable findings.  She currently does not endorse any symptoms.  Her blood pressure is well controlled with the current medical program. We will obtain CTA of the chest, abdomen and pelvis next year and see her for follow up afterwards. In the interim, if she develops any symptoms, she will communicate with us.     I asked to her watch her diet and weight.      I appreciate the opportunity to be part of this patient's care.          RAMAKRISHNA DIAZ MD        Orders Placed This Encounter   Procedures     CTA Chest Abdomen Pelvis w Contrast     Follow-Up with Cardiology       No orders of the defined types were placed in this encounter.      There are no discontinued medications.      Encounter Diagnosis   Name Primary?     H/O aortic dissection Yes       CURRENT MEDICATIONS:  Current Outpatient Medications   Medication Sig Dispense Refill     amLODIPine (NORVASC) 5 MG tablet TAKE 1 TABLET(5 MG) BY MOUTH DAILY 90 tablet 3     Ascorbic Acid (VITAMIN C PO) Take 500 mg by mouth daily       calcium carbonate (OS-ALEXI 500 MG [place]. CA) 500 MG tablet Take 500 mg by mouth daily       cholecalciferol (VITAMIN D3) 1000 UNITS capsule Take 1 capsule by  mouth daily.       labetalol (NORMODYNE) 200 MG tablet Take 1 tablet (200 mg) by mouth 3 times daily - overdue for fasting annual exam 270 tablet 3     losartan (COZAAR) 50 MG tablet Take 1 tablet (50 mg) by mouth daily 90 tablet 3     MAGNESIUM OXIDE PO Take 200 mg by mouth daily Reported on 4/19/2017       rosuvastatin (CRESTOR) 5 MG tablet TAKE 1 TABLET(5 MG) BY MOUTH DAILY (Patient taking differently: Take 5 mg by mouth daily ) 90 tablet 0     traZODone (DESYREL) 50 MG tablet TAKE 1 TABLET(50 MG) BY MOUTH EVERY NIGHT AS NEEDED FOR SLEEP 90 tablet 1     Acetaminophen (TYLENOL PO) Take 500 mg by mouth every 4 hours as needed (Patient not taking: Reported on 2/9/2022)         ALLERGIES     Allergies   Allergen Reactions     Lisinopril      Cough       Simvastatin      myalgias       PAST MEDICAL HISTORY:  Past Medical History:   Diagnosis Date     Anemia      Aortic valve insufficiency, etiology of cardiac valve disease unspecified 04/13/2016     Best vitelliform macular dystrophy      CKD (chronic kidney disease) stage 3, GFR 30-59 ml/min (H) 12/09/2015     Former smoker      HTN (hypertension), benign      Hx of repair of dissecting thoracic aortic aneurysm, [place] type A     +fibromuscular dysplasia     Hyperlipidemia LDL goal < 130      Insomnia      ASH (obstructive sleep apnea)(mild AHI=14) 04/12/2015     Osteoporosis, postmenopausal        PAST SURGICAL HISTORY:  Past Surgical History:   Procedure Laterality Date     CARDIAC SURGERY       HC OPEN TX METATARSAL FRACTURE       S/p thoracic aortic aneurysm repair  10/21/12     VASCULAR SURGERY         FAMILY HISTORY:  Family History   Problem Relation Age of Onset     Hypertension Father         best syndrome     Cancer Father         lung     Hypertension Paternal Grandmother         stroke     Alcohol/Drug Brother         best syndr       SOCIAL HISTORY:  Social History     Socioeconomic History     Marital status: Single     Spouse name: None     Number of  children: None     Years of education: None     Highest education level: None   Occupational History     None   Tobacco Use     Smoking status: Former Smoker     Quit date: 1974     Years since quittin.1     Smokeless tobacco: Never Used     Tobacco comment: light smoker    Substance and Sexual Activity     Alcohol use: Yes     Alcohol/week: 3.0 standard drinks     Types: 3 Glasses of wine per week     Comment: I glass wine with dinner     Drug use: No     Sexual activity: Yes     Partners: Male   Other Topics Concern     Parent/sibling w/ CABG, MI or angioplasty before 65F 55M? No      Service Not Asked     Blood Transfusions Not Asked     Caffeine Concern Yes     Comment: 2 cups  a day     Occupational Exposure Not Asked     Hobby Hazards Not Asked     Sleep Concern Not Asked     Stress Concern Not Asked     Weight Concern Not Asked     Special Diet Yes     Back Care Not Asked     Exercise Yes     Comment: go to the [employer]     Bike Helmet Not Asked     Seat Belt Not Asked     Self-Exams Not Asked   Social History Narrative    Working as a  PT.     Retired 2014.     Social Determinants of Health     Financial Resource Strain: Not on file   Food Insecurity: Not on file   Transportation Needs: Not on file   Physical Activity: Not on file   Stress: Not on file   Social Connections: Not on file   Intimate Partner Violence: Not on file   Housing Stability: Not on file       Review of Systems:  Skin:  Negative       Eyes:  Positive for glasses    ENT:  Negative      Respiratory:  Negative cough;shortness of breath     Cardiovascular:    Positive for;dizziness mild dizziness when getting up to fast  Gastroenterology: Negative heartburn;reflux    Genitourinary:  Negative      Musculoskeletal:  Negative      Neurologic:  Negative numbness or tingling of hands;numbness or tingling of feet    Psychiatric:  Negative      Heme/Lymph/Imm:  Negative      Endocrine:  Negative thyroid  "disorder;diabetes      Physical Exam:  Vitals: /65   Pulse 60   Ht 1.537 m (5' 0.5\")   Wt 56.5 kg (124 lb 9.6 oz)   SpO2 95%   BMI 23.93 kg/m      Constitutional:  in no acute distress        Skin:  warm and dry to the touch, no apparent skin lesions or masses noted          Head:  normocephalic;no masses or lesions        Eyes:  conjunctivae and lids unremarkable        Lymph:      ENT:  dentition good;no pallor or cyanosis        Neck:  JVP normal;no carotid bruit;carotid pulses are full and equal bilaterally        Respiratory:  clear to auscultation         Cardiac: regular rhythm             Normal S1, S2, 1/6 ELISEO, faint holodiastolic murmur at the LUSB, no rubs or gallops                                      2+ bilateral femoral, dp, radial and carotid    GI:  abdomen soft;no bruits        Extremities and Muscular Skeletal:  no edema;no deformities, clubbing, cyanosis, erythema observed              Neurological:  no gross motor deficits        Psych:  Alert and Oriented x 3          Kenn Jean-Baptiste MD, MD   [facility]  "

## 2022-03-30 DIAGNOSIS — I10 HTN (HYPERTENSION), BENIGN: ICD-10-CM

## 2022-03-31 RX ORDER — LOSARTAN POTASSIUM 50 MG/1
50 TABLET ORAL DAILY
Qty: 90 TABLET | Refills: 3 | Status: SHIPPED | OUTPATIENT
Start: 2022-01-25 | End: 2023-02-23

## 2022-03-31 NOTE — TELEPHONE ENCOUNTER
Per Rx sent previously, resent with prior start date:     losartan (COZAAR) 50 MG tablet 90 tablet 3 1/25/2022  No   Sig - Route: Take 1 tablet (50 mg) by mouth daily - Oral   Sent to pharmacy as: Losartan Potassium 50 MG Oral Tablet (COZAAR)   Class: E-Prescribe   Order: [id number]   E-Prescribing Status: Receipt confirmed by pharmacy (1/25/2022 11:22 AM CST)       Printout Tracking    External Result Report     Pharmacy    [id number] - [place], [place] - [address] AT [facility] OF Cody Cabrera RN  [facility]

## 2022-03-31 NOTE — TELEPHONE ENCOUNTER
Losartan 50 mg tablets    Summary: Take 1 tablet (50 mg) by mouth daily, Disp-90 tablet, R-3, E-Prescribe   Dose, Route, Frequency: 50 mg, Oral, DAILY  Start: 1/25/2022  Ord/Sold: 1/25/2022

## 2022-07-08 DIAGNOSIS — F51.01 PRIMARY INSOMNIA: ICD-10-CM

## 2022-07-11 ENCOUNTER — NURSE TRIAGE (OUTPATIENT)
Dept: NURSING | Facility: CLINIC | Age: 82
End: 2022-07-11

## 2022-07-11 ENCOUNTER — VIRTUAL VISIT (OUTPATIENT)
Dept: FAMILY MEDICINE | Facility: CLINIC | Age: 82
End: 2022-07-11
Payer: COMMERCIAL

## 2022-07-11 DIAGNOSIS — U07.1 INFECTION DUE TO 2019 NOVEL CORONAVIRUS: ICD-10-CM

## 2022-07-11 DIAGNOSIS — Z11.59 NEED FOR HEPATITIS C SCREENING TEST: Primary | ICD-10-CM

## 2022-07-11 PROCEDURE — 99213 OFFICE O/P EST LOW 20 MIN: CPT | Mod: CS | Performed by: NURSE PRACTITIONER

## 2022-07-11 NOTE — TELEPHONE ENCOUNTER
S-(situation): COVID     B-(background):   Symptoms started on 7/6  Tested positive at [facility] on 7/9  High risk pt (see problem list)    Known exposure to a family member    A-(assessment):   Cold symptoms started on 7/6. Pt states cold symptoms resolved quickly.  Pt continues to have hoarseness of voice  Fatigue  Poor appetite    No fever  No SOB  No chest pain    R-(recommendations): virtual visit for Paxlovid.  Sent to .  Pt states she has been given information by [facility] re: luciano Villarreal RN/[facility] Nurse Advisor      Reason for Disposition    HIGH RISK for severe COVID complications (e.g., weak immune system, age > 64 years, obesity with BMI > 25, pregnant, chronic lung disease or other chronic medical condition) (Exception: Already seen by PCP and no new or worsening symptoms.)    Additional Information    Negative: SEVERE difficulty breathing (e.g., struggling for each breath, speaks in single words)    Negative: Difficult to awaken or acting confused (e.g., disoriented, slurred speech)    Negative: Bluish (or gray) lips or face now    Negative: Shock suspected (e.g., cold/pale/clammy skin, too weak to stand, low BP, rapid pulse)    Negative: Sounds like a life-threatening emergency to the triager    Negative: SEVERE or constant chest pain or pressure  (Exception: Mild central chest pain, present only when coughing.)    Negative: MODERATE difficulty breathing (e.g., speaks in phrases, SOB even at rest, pulse 100-120)    Negative: Headache and stiff neck (can't touch chin to chest)    Negative: Oxygen level (e.g., pulse oximetry) 90 percent or lower    Negative: Chest pain or pressure    Negative: Patient sounds very sick or weak to the triager    Negative: MILD difficulty breathing (e.g., minimal/no SOB at rest, SOB with walking, pulse <100)    Negative: Fever > 103 F (39.4 C)    Negative: [1] Fever > 101 F (38.3 C) AND [2] over 60 years of age    Negative: [1] Fever > 100.0 F (37.8 C) AND  [2] bedridden (e.g., nursing home patient, CVA, chronic illness, recovering from surgery)    Protocols used: CORONAVIRUS (COVID-19) DIAGNOSED OR [id number] 1.18.2022

## 2022-07-11 NOTE — PATIENT INSTRUCTIONS
Coping with Life After COVID-19  Being in the hospital because of COVID-19 is scary. Going home can be scary, too. You may face changes to your life, the way you work or what you can eat. It s hard to adjust to change, and it s normal to feel afraid, frustrated or even angry. These feelings usually go away over time. If your feelings don t start to get better, it s called  adjustment disorder.      What signs should I look out for?  Adjustment disorder can happen to anyone in a time of stress. It makes it hard to cope with daily life. You may feel lonely or fight with loved ones, even if you re glad to be home. Watch for these signs:    Fear or worry    Hard time focusing    Sadness or anger    Trouble talking to family or friends    Feeling like you don t fit in or isolating yourself    Problems with sleep     Drinking alcohol or taking drugs to cope    What can I do?  You can help yourself get better. Feeling you have control helps you move forward. You may wonder if you ll be able to do things you did before. Be patient. Do your best to make the most of every day. Try to build relationships, be as active as you can, eat right and keep a sense of humor. Avoid smoking and drinking too much alcohol. Call your family doctor or clinic if you re not sure what to do. They can guide you to care or other services.    When should I get help?  Think about getting counseling if your sadness or frustration gets worse. Together with a trained counselor, you can talk about your problems adjusting to life after your hospital stay. You can come up with new ways to handle changes that give you more control. Your family doctor or care team can help you find a counselor.     Get help if you re thinking about hurting yourself. If you need help right away, call 911 or go to the nearest emergency room. You can also try the Crisis Text Line.    Crisis Text Line: 631-810 (http://www.crisistextline.org)  The Crisis Text Line serves  anyone, in any crisis. It gives free, 24/7 support. Here's how it works:  1. Text HOME to 548487 from anywhere in the [place], anytime, about any type of crisis.  2. A live, trained Crisis Counselor will text you back quickly.  3. The volunteer Crisis Counselor can help you move from a  hot moment  to a  cool moment.  They can also help you work out a safety plan.

## 2022-07-11 NOTE — PROGRESS NOTES
Karina is a 81 year old who is being evaluated via a billable telephone visit.      What phone number would you like to be contacted at? [phone]  How [facility]    Assessment & Plan   Problem List Items Addressed This Visit    None     Visit Diagnoses     Need for hepatitis C screening test    -  Primary    Infection due to 2019 novel coronavirus               Improved mostly, monitor symptoms and call back if worsening. Discussed further quarantine/isolation and mask wearing until 10 days.    No follow-ups on file.    Petra Koch, [facility]   Karina is a 81 year old, presenting for the following health issues:  No chief complaint on file.      HPI       COVID-19 Symptom Review  How many days ago did these symptoms start? Started with symptoms last Wednesday or Thursday - positive result on Saturday    Are any of the following symptoms significant for you?    New or worsening difficulty breathing? No    Worsening cough? No - was bad last week but its getting better    Fever or chills? No    Headache: No    Sore throat: No    Chest pain: No    Diarrhea: No    Body aches? No    What treatments has patient tried? Nothing in the last few days - took cough syrup on Thursday and cough drops   Does patient live in a nursing home, group home, or shelter? No  Does patient have a way to get food/medications during quarantined? Yes, I have a friend or family member who can help me.            Review of Systems   Constitutional, HEENT, cardiovascular, pulmonary, GI, , musculoskeletal, neuro, skin, endocrine and psych systems are negative, except as otherwise noted.      Objective           Vitals:  No vitals were obtained today due to virtual visit.    Physical Exam   healthy, alert and no distress  PSYCH: Alert and oriented times 3; coherent speech, normal   rate and volume, able to articulate logical thoughts, able   to abstract reason, no  tangential thoughts, no hallucinations   or delusions  Her affect is normal  RESP: No cough, no audible wheezing, able to talk in full sentences  Remainder of exam unable to be completed due to telephone visits                Phone call duration: 12 minutes    .  ..

## 2022-07-12 RX ORDER — TRAZODONE HYDROCHLORIDE 50 MG/1
TABLET, FILM COATED ORAL
Qty: 90 TABLET | Refills: 1 | Status: SHIPPED | OUTPATIENT
Start: 2022-07-12 | End: 2023-01-12

## 2022-09-28 ENCOUNTER — NURSE TRIAGE (OUTPATIENT)
Dept: FAMILY MEDICINE | Facility: CLINIC | Age: 82
End: 2022-09-28

## 2022-09-28 NOTE — TELEPHONE ENCOUNTER
SITUATION:   Dizziness has been intermittent for the last 3-6 months.   Patient had an episode on Sunday where she became so dizzy that she was unable to more. Then she developed a prickling sensation in her lips. Patient had to hold on to something until the dizziness stopped, which was about 15-20 min later. Patient has not experienced any other dizziness episodes. However, the patient still has the tingling in her lips. Yesterday she expereinced visual changes, but that lasted for a few minutes. She has not had this again. Denies chest pain, difficult breathing, or other symptoms.     BACKGROUND:   Dizziness  Aortic Valve Insufficiency  Thoracic Aneurysm    HOME TREATMENTS:  Rest    PLAN:   Patient advised to go to  to get an evaluation.      RECOMMENDED DISPOSITION:  See in 4 hoursWill comply with recommendation: yes   If further questions/concerns or if Sx do not improve, worsen or new Sx develop, call your PCP or [facility] as soon as possible.    NOTES:  Disposition was determined by the first positive assessment question, therefore all previous assessment questions were negative.       TRICIA HunterN, RN, PHN  [place]  September 28, 2022    Additional Information    Negative: SEVERE difficulty breathing (e.g., struggling for each breath, speaks in single words)    Negative: Shock suspected (e.g., cold/pale/clammy skin, too weak to stand, low BP, rapid pulse)    Negative: Difficult to awaken or acting confused (e.g., disoriented, slurred speech)    Negative: Fainted, and still feels dizzy afterwards    Negative: Overdose (accidental or intentional) of medications    Negative: New neurologic deficit that is present now: * Weakness of the face, arm, or leg on one side of the body * Numbness of the face, arm, or leg on one side of the body * Loss of speech or garbled speech    Negative: Heart beating < 50 beats per minute OR > 140 beats per minute    Negative: Sounds  like a life-threatening emergency to the triager    Negative: Chest pain    Negative: Rectal bleeding, bloody stool, or tarry-black stool    Negative: Vomiting is main symptom    Negative: Diarrhea is main symptom    Negative: Headache is main symptom    Negative: Heat exhaustion suspected (i.e., dehydration from heat exposure)    Negative: Patient states that they are having an anxiety or panic attack    Negative: Dizziness from low blood sugar (i.e., < 60 mg/dl or 3.5 mmol/l)    Negative: SEVERE dizziness (e.g., unable to stand, requires support to walk, feels like passing out now)    Negative: SEVERE headache or neck pain    Negative: Spinning or tilting sensation (vertigo) present now and one or more stroke risk factors (i.e., hypertension, diabetes mellitus, prior stroke/TIA, heart attack, age over 60) (Exception: prior physician evaluation for this AND no different/worse than usual)    Negative: Neurologic deficit that was brief (now gone), ANY of the following:* Weakness of the face, arm, or leg on one side of the body* Numbness of the face, arm, or leg on one side of the body* Loss of speech or garbled speech    Negative: Loss of vision or double vision  (Exception: Similar to previous migraines.)    Negative: Extra heart beats OR irregular heart beating (i.e., 'palpitations')    Negative: Difficulty breathing    Negative: Drinking very little and has signs of dehydration (e.g., no urine > 12 hours, very dry mouth, very lightheaded)    Negative: Follows bleeding (e.g., stomach, rectum, vagina)  (Exception: Became dizzy from sight of small amount blood.)    Negative: Patient sounds very sick or weak to the triager    Protocols used: DIZZINESS-A-OH

## 2022-09-30 ENCOUNTER — OFFICE VISIT (OUTPATIENT)
Dept: FAMILY MEDICINE | Facility: CLINIC | Age: 82
End: 2022-09-30
Payer: COMMERCIAL

## 2022-09-30 VITALS
HEART RATE: 58 BPM | TEMPERATURE: 97.1 F | DIASTOLIC BLOOD PRESSURE: 57 MMHG | BODY MASS INDEX: 22.05 KG/M2 | OXYGEN SATURATION: 95 % | WEIGHT: 114.8 LBS | SYSTOLIC BLOOD PRESSURE: 127 MMHG

## 2022-09-30 DIAGNOSIS — R42 DIZZINESS: Primary | ICD-10-CM

## 2022-09-30 DIAGNOSIS — Z98.890 HX OF REPAIR OF DISSECTING THORACIC AORTIC ANEURYSM, STANFORD TYPE A: ICD-10-CM

## 2022-09-30 DIAGNOSIS — Z86.79 HX OF REPAIR OF DISSECTING THORACIC AORTIC ANEURYSM, STANFORD TYPE A: ICD-10-CM

## 2022-09-30 DIAGNOSIS — R20.2 TINGLING OF FACE: ICD-10-CM

## 2022-09-30 LAB
ALBUMIN SERPL-MCNC: 4 G/DL (ref 3.4–5)
ALP SERPL-CCNC: 69 U/L (ref 40–150)
ALT SERPL W P-5'-P-CCNC: 40 U/L (ref 0–50)
ANION GAP SERPL CALCULATED.3IONS-SCNC: 6 MMOL/L (ref 3–14)
AST SERPL W P-5'-P-CCNC: 28 U/L (ref 0–45)
BASOPHILS # BLD AUTO: 0 10E3/UL (ref 0–0.2)
BASOPHILS NFR BLD AUTO: 0 %
BILIRUB SERPL-MCNC: 1.1 MG/DL (ref 0.2–1.3)
BUN SERPL-MCNC: 23 MG/DL (ref 7–30)
CALCIUM SERPL-MCNC: 9.4 MG/DL (ref 8.5–10.1)
CHLORIDE BLD-SCNC: 103 MMOL/L (ref 94–109)
CO2 SERPL-SCNC: 26 MMOL/L (ref 20–32)
CREAT SERPL-MCNC: 0.88 MG/DL (ref 0.52–1.04)
EOSINOPHIL # BLD AUTO: 0.4 10E3/UL (ref 0–0.7)
EOSINOPHIL NFR BLD AUTO: 6 %
ERYTHROCYTE [DISTWIDTH] IN BLOOD BY AUTOMATED COUNT: 14.1 % (ref 10–15)
GFR SERPL CREATININE-BSD FRML MDRD: 66 ML/MIN/1.73M2
GLUCOSE BLD-MCNC: 102 MG/DL (ref 70–99)
HCT VFR BLD AUTO: 39.4 % (ref 35–47)
HGB BLD-MCNC: 12.9 G/DL (ref 11.7–15.7)
LYMPHOCYTES # BLD AUTO: 0.9 10E3/UL (ref 0.8–5.3)
LYMPHOCYTES NFR BLD AUTO: 12 %
MCH RBC QN AUTO: 31.2 PG (ref 26.5–33)
MCHC RBC AUTO-ENTMCNC: 32.7 G/DL (ref 31.5–36.5)
MCV RBC AUTO: 95 FL (ref 78–100)
MONOCYTES # BLD AUTO: 0.6 10E3/UL (ref 0–1.3)
MONOCYTES NFR BLD AUTO: 8 %
NEUTROPHILS # BLD AUTO: 5.4 10E3/UL (ref 1.6–8.3)
NEUTROPHILS NFR BLD AUTO: 74 %
PLATELET # BLD AUTO: 213 10E3/UL (ref 150–450)
POTASSIUM BLD-SCNC: 4.7 MMOL/L (ref 3.4–5.3)
PROT SERPL-MCNC: 7.1 G/DL (ref 6.8–8.8)
RBC # BLD AUTO: 4.13 10E6/UL (ref 3.8–5.2)
SODIUM SERPL-SCNC: 135 MMOL/L (ref 133–144)
TSH SERPL DL<=0.005 MIU/L-ACNC: 3.25 MU/L (ref 0.4–4)
WBC # BLD AUTO: 7.3 10E3/UL (ref 4–11)

## 2022-09-30 PROCEDURE — 80053 COMPREHEN METABOLIC PANEL: CPT | Performed by: PHYSICIAN ASSISTANT

## 2022-09-30 PROCEDURE — 85025 COMPLETE CBC W/AUTO DIFF WBC: CPT | Performed by: PHYSICIAN ASSISTANT

## 2022-09-30 PROCEDURE — 99203 OFFICE O/P NEW LOW 30 MIN: CPT | Performed by: PHYSICIAN ASSISTANT

## 2022-09-30 PROCEDURE — 36415 COLL VENOUS BLD VENIPUNCTURE: CPT | Performed by: PHYSICIAN ASSISTANT

## 2022-09-30 PROCEDURE — 84443 ASSAY THYROID STIM HORMONE: CPT | Performed by: PHYSICIAN ASSISTANT

## 2022-09-30 ASSESSMENT — ENCOUNTER SYMPTOMS: DIZZINESS: 1

## 2022-09-30 ASSESSMENT — PATIENT HEALTH QUESTIONNAIRE - PHQ9
10. IF YOU CHECKED OFF ANY PROBLEMS, HOW DIFFICULT HAVE THESE PROBLEMS MADE IT FOR YOU TO DO YOUR WORK, TAKE CARE OF THINGS AT HOME, OR GET ALONG WITH OTHER PEOPLE: NOT DIFFICULT AT ALL
SUM OF ALL RESPONSES TO PHQ QUESTIONS 1-9: 0
SUM OF ALL RESPONSES TO PHQ QUESTIONS 1-9: 0

## 2022-09-30 NOTE — PROGRESS NOTES
Assessment & Plan     Dizziness  Symptoms essentially resolved, very well could have been vertigo flare like in her history.  Will screen labs, also gave her info on at home Epley maneuver.  Discussed stroke symptoms in detail, and if any of them were to appear, emergent ER visit.  She has excellent knowledge, and she understands.  - CBC with platelets and differential; Future  - Comprehensive metabolic panel (BMP + Alb, Alk Phos, ALT, AST, Total. Bili, TP); Future  - TSH with free T4 reflex; Future  - CBC with platelets and differential  - Comprehensive metabolic panel (BMP + Alb, Alk Phos, ALT, AST, Total. Bili, TP)  - TSH with free T4 reflex    Tingling of face    - CBC with platelets and differential; Future  - Comprehensive metabolic panel (BMP + Alb, Alk Phos, ALT, AST, Total. Bili, TP); Future  - TSH with free T4 reflex; Future  - CBC with platelets and differential  - Comprehensive metabolic panel (BMP + Alb, Alk Phos, ALT, AST, Total. Bili, TP)  - TSH with free T4 reflex    Hx of repair of dissecting thoracic aortic aneurysm, Vasile type A  Most likely not playing a role in symptoms.                   No follow-ups on file.    Ilia Ellis PA-C  [facility]    Channing Collins is a 81 year old, presenting for the following health issues:  Dizziness      Dizziness    History of Present Illness       Reason for visit:  Recent Vertigo, daily dizziness, lips withprickling -severe with vertigo on Sunday- still have tingling in lips that is not severe. Recenteye problems unable to read bottom half of iphone    She eats 2-3 servings of fruits and vegetables daily.She consumes 1 sweetened beverage(s) daily.She exercises with enough effort to increase her heart rate 10 to 19 minutes per day.  She exercises with enough effort to increase her heart rate 3 or less days per week.   She is taking medications regularly.    Today's PHQ-9         PHQ-9 Total Score: 0    PHQ-9 Q9 Thoughts  of better off dead/self-harm past 2 weeks :   Not at all    How difficult have these problems made it for you to do your work, take care of things at home, or get along with other people: Not difficult at all       Dizziness  Onset/Duration: 5 days ago   Description:   Do you feel faint: YES  Does it feel like the surroundings (bed, room) are moving: YES  Unsteady/off balance: YES  Have you passed out or fallen: No  Intensity: moderate  Progression of Symptoms: intermittent  Accompanying Signs & Symptoms:  Heart palpitations or chest pain: No  Nausea, vomiting: No  Weakness or lack of coordination in arms or legs: YES  Vision or speech changes: no  Numbness or tingling: YES- Lips   Ringing in ears (Tinnitus): No  Hearing Loss: No  History:   Head trauma/concussion history: No  Previous similar symptoms: YES  Recent bleeding history: No  Any new medications (BP?): No  Precipitating factors:   Worse with activity: No  Worse with head movement: No  Alleviating factors:   Does staying in a fixed position give relief: YES  Therapies tried and outcome: None    82 y/o new to me female here for evaluation of some dizziness.  She had severe episodes of vertigo 7 years ago that took her to ED, negative work up.  She did some vestibular PHYSICAL THERAPY and it resolved.  She has similar but not nearly as severe episode on Sunday.  Did not feel like she was going to pass out, mostly unstable.  Did not all.  She did have a bit of tingling on upper and lower lips, full not partial.  No issues with speech.  Did not notice any facial weakness or drooping.    Most of her symptoms has fully resolved, maybe a bit of tingling.    Hx of thoracic aortic aneurysm repair, has done great since that.    Review of Systems   Neurological: Positive for dizziness.      Constitutional, HEENT, cardiovascular, pulmonary, gi and gu systems are negative, except as otherwise noted.      Objective    /57   Pulse 58   Temp 97.1  F (36.2  C)  (Temporal)   Wt 52.1 kg (114 lb 12.8 oz)   SpO2 95%   BMI 22.05 kg/m    Body mass index is 22.05 kg/m .  Physical Exam   GENERAL: alert and no distress  EYES: Eyes grossly normal to inspection, PERRL and EOMI  HENT: normal cephalic/atraumatic, ear canals and TM's normal, nose and mouth without ulcers or lesions, oropharynx clear and oral mucous membranes moist  NECK: no adenopathy, no asymmetry, masses, or scars, thyroid normal to palpation and no carotid bruits  CV: regular rates and rhythm, normal S1 S2, no S3 or S4, grade 2/6 diastolic murmur, peripheral pulses strong and no peripheral edema  SKIN: no suspicious lesions or rashes  NEURO: Normal strength and tone, sensory exam grossly normal, mentation intact, speech normal, cranial nerves 2-12 intact and gait normal including heel/toe/tandem walking

## 2022-10-03 NOTE — RESULT ENCOUNTER NOTE
Dear Karina    Your test results are attached, feel free to contact me via [employer]     Everything looks good on your labs.  How have you been feeling?    Michael Ellis PA-C

## 2022-11-20 ENCOUNTER — HEALTH MAINTENANCE LETTER (OUTPATIENT)
Age: 82
End: 2022-11-20

## 2022-12-05 DIAGNOSIS — I10 HTN (HYPERTENSION), BENIGN: ICD-10-CM

## 2022-12-06 RX ORDER — LABETALOL 200 MG/1
200 TABLET, FILM COATED ORAL 3 TIMES DAILY
Qty: 270 TABLET | Refills: 2 | Status: SHIPPED | OUTPATIENT
Start: 2022-12-06 | End: 2023-12-28

## 2022-12-06 NOTE — TELEPHONE ENCOUNTER
Prescription approved per [facility] Refill Protocol.  Paz Alvarez RN

## 2022-12-15 DIAGNOSIS — E78.5 HYPERLIPIDEMIA LDL GOAL <100: ICD-10-CM

## 2022-12-16 RX ORDER — ROSUVASTATIN CALCIUM 5 MG/1
5 TABLET, COATED ORAL DAILY
Qty: 90 TABLET | Refills: 1 | Status: SHIPPED | OUTPATIENT
Start: 2022-12-16 | End: 2023-06-05

## 2023-01-12 ENCOUNTER — NURSE TRIAGE (OUTPATIENT)
Dept: NURSING | Facility: CLINIC | Age: 83
End: 2023-01-12

## 2023-01-12 ENCOUNTER — NURSE TRIAGE (OUTPATIENT)
Dept: CARDIOLOGY | Facility: CLINIC | Age: 83
End: 2023-01-12
Payer: COMMERCIAL

## 2023-01-12 DIAGNOSIS — F51.01 PRIMARY INSOMNIA: ICD-10-CM

## 2023-01-12 RX ORDER — TRAZODONE HYDROCHLORIDE 50 MG/1
50 TABLET, FILM COATED ORAL
Qty: 90 TABLET | Refills: 1 | Status: SHIPPED | OUTPATIENT
Start: 2023-01-12 | End: 2023-08-21

## 2023-01-12 NOTE — TELEPHONE ENCOUNTER
Medication Question or Refill        What medication are you calling about (include dose and sig)?: traZODone (DESYREL) 50 MG tablet       Controlled Substance Agreement on file:   CSA -- Patient Level:    CSA: None found at the patient level.       Who prescribed the medication?: PCP    Do you need a refill? Yes:     When did you use the medication last? 2 weeks ago    Patient offered an appointment? No    Do you have any questions or concerns?  No    Preferred Pharmacy:   [id number]  [place] [address] [address] [facility] [address]  [address]  [place] [address]  Phone: [phone] Fax: [phone]      Could we send this information to you in [employer] or would you prefer to receive a phone call?:   Patient would prefer a phone call   Okay to leave a detailed message?: Yes at Cell number on file:    Telephone Information:   Mobile [phone]

## 2023-01-12 NOTE — TELEPHONE ENCOUNTER
"Patient called stating she has been having consistent left-sided severe chest pain since this AM. This is the first it has happened since her aortic dissection repair. She rates at a 7 or 8 when she stands straight and is more relieved with hunching over. She is concerned something may have happened with the graft. No radiation of pain to any other area. No other symptoms to note. Patient's next appointment is 2/20/23. RN recommended patient present to ED for assessment. Patient verbalized understanding to go should symptoms persist or become more severe. Also routing to Dr. Jean-Baptiste's nursing team.    1. LOCATION: \"Where does it hurt?\" Left side of chest.  2. RADIATION: \"Does the pain go anywhere else?\" (e.g., into neck, jaw, arms, back) No.  3. ONSET: \"When did the chest pain begin?\" (Minutes, hours or days) This AM.   4. PATTERN \"Does the pain come and go, or has it been constant since it started?\" \"Does it get worse with exertion?\" Consistent today.    5. DURATION: \"How long does it last\" (e.g., seconds, minutes, hours) Consistent.  6. SEVERITY: \"How bad is the pain?\" (e.g., Scale 1-10; mild, moderate, or severe) 7 or 8 when she stands straight.  - MILD (1-3): doesn't interfere with normal activities   - MODERATE (4-7): interferes with normal activities or awakens from sleep  - SEVERE (8-10): excruciating pain, unable to do any normal activities   7. CARDIAC RISK FACTORS: \"Do you have any history of heart problems or risk factors for heart disease?\" (e.g., angina, prior heart attack; diabetes, high blood pressure, high cholesterol, smoker, or strong family history of heart disease) H/O aortic dissection.  8. PULMONARY RISK FACTORS: \"Do you have any history of lung disease?\" (e.g., blood clots in lung, asthma, emphysema, birth control pills) None.  9. CAUSE: \"What do you think is causing the chest pain?\" Related possibly to aortic dissection repair.  10. OTHER SYMPTOMS: \"Do you have any other symptoms?\" (e.g., " dizziness, nausea, vomiting, sweating, fever, difficulty breathing, cough) None.    Reason for Disposition    SEVERE chest pain    Protocols used: CHEST PAIN-A-OH

## 2023-01-12 NOTE — TELEPHONE ENCOUNTER
Noted pt has not gone into the ER yet. Called and spoke with pt. Per pt the pain has continued. Pt stated she is having a dinner party tonight so she does not want to go to the ER. Reiterated recommendation to go to the ER for evaluation and advised we cannot determine the cause of her chest pain over the phone and with her history of aortic dissection her symptoms are very concerning. Pt verbalized understanding and stated she will consider going to the ER.

## 2023-01-13 ENCOUNTER — PATIENT OUTREACH (OUTPATIENT)
Dept: FAMILY MEDICINE | Facility: CLINIC | Age: 83
End: 2023-01-13

## 2023-01-13 ENCOUNTER — HOSPITAL ENCOUNTER (EMERGENCY)
Facility: CLINIC | Age: 83
Discharge: HOME | End: 2023-01-13
Attending: EMERGENCY MEDICINE | Admitting: EMERGENCY MEDICINE
Payer: COMMERCIAL

## 2023-01-13 ENCOUNTER — APPOINTMENT (OUTPATIENT)
Dept: CT IMAGING | Facility: CLINIC | Age: 83
End: 2023-01-13
Attending: EMERGENCY MEDICINE
Payer: COMMERCIAL

## 2023-01-13 VITALS
SYSTOLIC BLOOD PRESSURE: 147 MMHG | WEIGHT: 117 LBS | BODY MASS INDEX: 22.09 KG/M2 | TEMPERATURE: 97.8 F | RESPIRATION RATE: 17 BRPM | HEART RATE: 65 BPM | DIASTOLIC BLOOD PRESSURE: 62 MMHG | OXYGEN SATURATION: 96 % | HEIGHT: 61 IN

## 2023-01-13 DIAGNOSIS — R07.89 CHEST WALL PAIN: ICD-10-CM

## 2023-01-13 LAB
ALBUMIN SERPL-MCNC: 3.9 G/DL (ref 3.4–5)
ALP SERPL-CCNC: 84 U/L (ref 40–150)
ALT SERPL W P-5'-P-CCNC: 55 U/L (ref 0–50)
ANION GAP SERPL CALCULATED.3IONS-SCNC: 6 MMOL/L (ref 3–14)
AST SERPL W P-5'-P-CCNC: 38 U/L (ref 0–45)
ATRIAL RATE - MUSE: 64 BPM
BASOPHILS # BLD AUTO: 0 10E3/UL (ref 0–0.2)
BASOPHILS NFR BLD AUTO: 1 %
BILIRUB SERPL-MCNC: 1.3 MG/DL (ref 0.2–1.3)
BUN SERPL-MCNC: 21 MG/DL (ref 7–30)
CALCIUM SERPL-MCNC: 9.3 MG/DL (ref 8.5–10.1)
CHLORIDE BLD-SCNC: 103 MMOL/L (ref 94–109)
CO2 SERPL-SCNC: 29 MMOL/L (ref 20–32)
CREAT BLD-MCNC: 1 MG/DL (ref 0.5–1)
CREAT SERPL-MCNC: 0.93 MG/DL (ref 0.52–1.04)
DIASTOLIC BLOOD PRESSURE - MUSE: NORMAL MMHG
EOSINOPHIL # BLD AUTO: 0.7 10E3/UL (ref 0–0.7)
EOSINOPHIL NFR BLD AUTO: 9 %
ERYTHROCYTE [DISTWIDTH] IN BLOOD BY AUTOMATED COUNT: 13.2 % (ref 10–15)
GFR SERPL CREATININE-BSD FRML MDRD: 57 ML/MIN/1.73M2
GFR SERPL CREATININE-BSD FRML MDRD: 62 ML/MIN/1.73M2
GLUCOSE BLD-MCNC: 101 MG/DL (ref 70–99)
HCT VFR BLD AUTO: 39.1 % (ref 35–47)
HGB BLD-MCNC: 12.7 G/DL (ref 11.7–15.7)
HOLD SPECIMEN: NORMAL
HOLD SPECIMEN: NORMAL
IMM GRANULOCYTES # BLD: 0 10E3/UL
IMM GRANULOCYTES NFR BLD: 0 %
INTERPRETATION ECG - MUSE: NORMAL
LYMPHOCYTES # BLD AUTO: 1.7 10E3/UL (ref 0.8–5.3)
LYMPHOCYTES NFR BLD AUTO: 21 %
MCH RBC QN AUTO: 30.6 PG (ref 26.5–33)
MCHC RBC AUTO-ENTMCNC: 32.5 G/DL (ref 31.5–36.5)
MCV RBC AUTO: 94 FL (ref 78–100)
MONOCYTES # BLD AUTO: 1 10E3/UL (ref 0–1.3)
MONOCYTES NFR BLD AUTO: 13 %
NEUTROPHILS # BLD AUTO: 4.4 10E3/UL (ref 1.6–8.3)
NEUTROPHILS NFR BLD AUTO: 56 %
NRBC # BLD AUTO: 0 10E3/UL
NRBC BLD AUTO-RTO: 0 /100
P AXIS - MUSE: 54 DEGREES
PLATELET # BLD AUTO: 235 10E3/UL (ref 150–450)
POTASSIUM BLD-SCNC: 4.1 MMOL/L (ref 3.4–5.3)
PR INTERVAL - MUSE: 154 MS
PROT SERPL-MCNC: 7.3 G/DL (ref 6.8–8.8)
QRS DURATION - MUSE: 90 MS
QT - MUSE: 462 MS
QTC - MUSE: 476 MS
R AXIS - MUSE: -11 DEGREES
RBC # BLD AUTO: 4.15 10E6/UL (ref 3.8–5.2)
SODIUM SERPL-SCNC: 138 MMOL/L (ref 133–144)
SYSTOLIC BLOOD PRESSURE - MUSE: NORMAL MMHG
T AXIS - MUSE: 69 DEGREES
TROPONIN I SERPL HS-MCNC: 10 NG/L
TROPONIN I SERPL HS-MCNC: 10 NG/L
VENTRICULAR RATE- MUSE: 64 BPM
WBC # BLD AUTO: 7.9 10E3/UL (ref 4–11)

## 2023-01-13 PROCEDURE — 85004 AUTOMATED DIFF WBC COUNT: CPT | Performed by: EMERGENCY MEDICINE

## 2023-01-13 PROCEDURE — 99285 EMERGENCY DEPT VISIT HI MDM: CPT | Mod: 25

## 2023-01-13 PROCEDURE — 74177 CT ABD & PELVIS W/CONTRAST: CPT

## 2023-01-13 PROCEDURE — 84484 ASSAY OF TROPONIN QUANT: CPT | Mod: 91 | Performed by: EMERGENCY MEDICINE

## 2023-01-13 PROCEDURE — 250N000009 HC RX 250: Performed by: EMERGENCY MEDICINE

## 2023-01-13 PROCEDURE — 82565 ASSAY OF CREATININE: CPT

## 2023-01-13 PROCEDURE — 93005 ELECTROCARDIOGRAM TRACING: CPT

## 2023-01-13 PROCEDURE — 84484 ASSAY OF TROPONIN QUANT: CPT | Performed by: EMERGENCY MEDICINE

## 2023-01-13 PROCEDURE — 36415 COLL VENOUS BLD VENIPUNCTURE: CPT | Performed by: EMERGENCY MEDICINE

## 2023-01-13 PROCEDURE — 250N000011 HC RX IP 250 OP 636: Performed by: EMERGENCY MEDICINE

## 2023-01-13 PROCEDURE — 80053 COMPREHEN METABOLIC PANEL: CPT | Performed by: EMERGENCY MEDICINE

## 2023-01-13 RX ORDER — IOPAMIDOL 755 MG/ML
80 INJECTION, SOLUTION INTRAVASCULAR ONCE
Status: COMPLETED | OUTPATIENT
Start: 2023-01-13 | End: 2023-01-13

## 2023-01-13 RX ADMIN — IOPAMIDOL 80 ML: 755 INJECTION, SOLUTION INTRAVENOUS at 02:50

## 2023-01-13 RX ADMIN — SODIUM CHLORIDE 80 ML: 900 INJECTION INTRAVENOUS at 02:50

## 2023-01-13 ASSESSMENT — ENCOUNTER SYMPTOMS
NAUSEA: 0
BACK PAIN: 0
DIZZINESS: 0
VOMITING: 0
FEVER: 0
COUGH: 0
ABDOMINAL PAIN: 0
SHORTNESS OF BREATH: 0

## 2023-01-13 ASSESSMENT — ACTIVITIES OF DAILY LIVING (ADL)
ADLS_ACUITY_SCORE: 37
ADLS_ACUITY_SCORE: 37

## 2023-01-13 NOTE — TELEPHONE ENCOUNTER
"Appointments in Next Year    Jan 30, 2023 10:00 AM  (Arrive by 9:40 AM)  Annual Wellness Visit with Sary Campos MD  [facility] ([facility] ) [phone]   Feb 09, 2023 10:20 AM  (Arrive by 10:00 AM)  CTA CHEST ABDOMEN PELVIS W CONTRAST with SHCT2  [facility] ([facility] ) [phone]   Feb 20, 2023 10:45 AM  (Arrive by 10:40 AM)  Return Cardiology with Kenn Jean-Baptiste MD  [facility] ([facility] ) [phone]        ED/Discharge Protocol    \"Hi, my name is Izzy Ledezma RN, a registered nurse, and I am calling on behalf of Susan Pena/Dr Campos's office at [place].  I am calling to follow up and see how things are going for you after your recent visit.\"    \"I see that you were in the (ER/UC/IP) on today.    How are you doing now that you are home?\" so relieved it's not cardiac related     Is patient experiencing symptoms that may require a hospital visit?  No     Discharge Instructions    \"Let's review your discharge instructions.  What is/are the follow-up recommendations?  Pt. Response: follow up with cardiology and Dr Campos as scheduled for est care visit     \"Were you instructed to make a follow-up appointment?\"  Pt. Response: Yes.  Has appointment been made?   Yes      \"When you see the provider, I would recommend that you bring your discharge instructions with you.    Medications    \"How many new medications are you on since your hospitalization/ED visit?\"    0-1  \"How many of your current medicines changed (dose, timing, name, etc.) while you were in the hospital/ED visit?\"   0-1  \"Do you have questions about your medications?\"   No  \"Were you newly diagnosed with heart failure, COPD, diabetes or did you have a heart attack?\"   No  For patients on insulin: \"Did you start on insulin in the hospital or did you have your insulin dose changed?\"   No  Post Discharge " "Medication Reconciliation Status: discharge medications reconciled, continue medications without change.    Was MTM referral placed (*Make sure to put transitions as reason for referral)?   No    Call Summary    \"Do you have any questions or concerns about your condition or care plan at the moment?\"    No    Patient was in ER 1x in the past year (assess appropriateness of ER visits.)      \"If you have questions or things don't continue to improve, we encourage you contact us through the main clinic number,  ([phone]).  Even if the clinic is not open, triage nurses are available 24/7 to help you.     We would like you to know that our clinic has extended hours (provide information).  We also have urgent care (provide details on closest location and hours/contact info)\"      \"Thank you for your time and take care!\"    Izzy RABAGO Triage RN  [facility]       "

## 2023-01-13 NOTE — ED TRIAGE NOTES
Pt states chest pain began morning of 1/12. Pt became worse and woke pt up at 0100 this morning.      Triage Assessment     Row Name 01/13/23 0211       Triage Assessment (Adult)    Airway WDL WDL       Respiratory WDL    Respiratory WDL WDL       Skin Circulation/Temperature WDL    Skin Circulation/Temperature WDL WDL       Cardiac WDL    Cardiac WDL X;chest pain       Chest Pain Assessment    Chest Pain Location anterior chest, left       Peripheral/Neurovascular WDL    Peripheral Neurovascular WDL WDL       Cognitive/Neuro/Behavioral WDL    Cognitive/Neuro/Behavioral WDL WDL

## 2023-01-13 NOTE — TELEPHONE ENCOUNTER
What type of discharge? Emergency Department  Risk of Hospital admission or ED visit: 69%  Is a TCM episode required? No  When should the patient follow up with PCP? Follow up not needed     Izzy Ledezma RN  [facility]

## 2023-01-13 NOTE — ED PROVIDER NOTES
History   Chief Complaint:  Chest Pain     The history is provided by the patient and medical records.      Karina Lopez is a 82 year old female who presents with chest pain. The patient states she had onset of mid-sternal to left-sided chest pain after waking up at 0630 yesterday morning. Pain does not radiate and has been constant. She notes pain worsens when standing up straight or does certain movements. She has no change in pain with deep inhalation or ambulation. She denies fevers, cough, or shortness of breath. She denies back pain. She denies abdominal pain, nausea, or vomting. She denies dizziness. 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 She does not take aspirin. She denies recent medication changes. She denies recent long distance travel. She has a visit with Dr. Jean-Baptiste of cardiology scheduled in the next few weeks.     During recheck, the patient stated she started swimming again 2 days ago. During this, she has been doing backstroke and moving her arms frequently back-and-forth in the water while standing to help get muscle in her arms. When moving her arms as if she is swimming, she is able to reproduce her chest pain.     Review of External Notes: I reviewed the patient's cardiology visit from Dr. Jean-Baptiste on 02/09/2022.  The most recent CTA was in 2020, showing a patent aortic arch vessels and interval complete resolution of the false lumen within the thoracic aorta.      Review of Systems   Constitutional: Negative for fever.   Respiratory: Negative for cough and shortness of breath.    Cardiovascular: Positive for chest pain.   Gastrointestinal: Negative for abdominal pain, nausea and vomiting.   Musculoskeletal: Negative for back pain.   Neurological: Negative for dizziness.   All other systems reviewed and are negative.    Allergies:  Lisinopril  Simvastatin     Medications:    Norvasc  Normodyne   Cozaar  Crestor  Desyrel     Past Medical History:   "  Anemia  Aortic valve insufficiency   Best vitelliform macular dystrophy   Chronic kidney disease, stage 3  Former smoker  Hypertension  Hyperlipidemia  Insomnia  Obstructive sleep apnea   Dissecting thoracic aortic aneurysm, [place] type A  Osteoporosis   Lumbago     Past Surgical History:    Cardiac surgery   Open metatarsal fracture repair  Thoracic aortic aneurysm repair      Family History:    family history includes Alcohol/Drug in her brother; Cancer in her father; Hypertension in her father and paternal grandmother.    Social History:  The patient was accompanied to the emergency department by a friend.   reports that she quit smoking about 49 years ago. She has never used smokeless tobacco. She reports current alcohol use of about 3.0 standard drinks per week. She reports that she does not use drugs.  PCP: Susan Pena     Physical Exam     Patient Vitals for the past 24 hrs:   BP Temp Temp src Pulse Resp SpO2 Height Weight   01/13/23 0301 -- -- -- 65 17 96 % -- --   01/13/23 0220 -- -- -- 62 13 96 % -- --   01/13/23 0218 -- -- -- -- 29 -- -- --   01/13/23 0217 -- -- -- -- -- -- 1.549 m (5' 1\") 53.1 kg (117 lb)   01/13/23 0216 (!) 147/62 -- -- 62 -- 96 % -- --   01/13/23 0215 (!) 177/71 97.8  F (36.6  C) Oral -- -- -- -- --   01/13/23 0211 (!) 177/71 -- -- 68 -- -- -- --      Physical Exam   Constitutional:  Patient is oriented to person, place, and time. They appear well-developed and well-nourished. Mild distress secondary to chest pain.    HENT:   Mouth/Throat:   Oropharynx is clear and moist.   Eyes:    Conjunctivae normal and EOM are normal. Pupils are equal, round, and reactive to light.   Neck:    Normal range of motion.   Cardiovascular: Normal rate, regular rhythm and normal heart sounds.  Exam reveals no gallop and no friction rub.  No murmur heard.  Pulmonary/Chest:  Effort normal and breath sounds normal. Patient has no wheezes. Patient has no rales. She has chest pain that is reproducible " "with bringing her hand up along her ear. No pleuritic pain.   Abdominal:   Soft. Bowel sounds are normal. Patient exhibits no mass. There is no tenderness. There is no rebound and no guarding.   Musculoskeletal:  Normal range of motion. Patient exhibits no edema.   Neurological:   Patient is alert and oriented to person, place, and time. Patient has normal strength. No cranial nerve deficit or sensory deficit. GCS 15  Skin:   Skin is warm and dry. No rash noted. No erythema.   Psychiatric:   Patient has a normal mood and affect. Patient's behavior is normal. Judgment and thought content normal.     Emergency Department Course   ECG  ECG taken at 0214, ECG read at 0218  Sinus rhythm with premature atrial complexes with aberrant conduction   RSR\" or QR pattern in V1 suggests right ventricular conduction delay   Borderline ECG   Rate 64 bpm. WA interval 154 ms. QRS duration 90 ms. QT/QTc 462/476 ms. P-R-T axes 54 -11 69.     Imaging:  CT Aortic Survey w Contrast   Final Result   IMPRESSION:   1.  No definite evidence of acute aortic pathology.   2.  Stable appearance of the graft repair of the ascending thoracic aorta.   3.  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   4.  No other acute process in the chest, abdomen, or pelvis.         Report per radiology    Laboratory:  Labs Ordered and Resulted from Time of ED Arrival to Time of ED Departure   COMPREHENSIVE METABOLIC PANEL - Abnormal       Result Value    Sodium 138      Potassium 4.1      Chloride 103      Carbon Dioxide (CO2) 29      Anion Gap 6      Urea Nitrogen 21      Creatinine 0.93      Calcium 9.3      Glucose 101 (*)     Alkaline Phosphatase 84      AST 38      ALT 55 (*)     Protein Total 7.3      Albumin 3.9      Bilirubin Total 1.3      GFR Estimate 62     ISTAT CREATININE POCT - Abnormal    Creatinine " POCT 1.0      GFR, ESTIMATED POCT 57 (*)    TROPONIN I - Normal    Troponin I High Sensitivity 10     TROPONIN I - Normal    Troponin I High Sensitivity 10     CBC WITH PLATELETS AND DIFFERENTIAL    WBC Count 7.9      RBC Count 4.15      Hemoglobin 12.7      Hematocrit 39.1      MCV 94      MCH 30.6      MCHC 32.5      RDW 13.2      Platelet Count 235      % Neutrophils 56      % Lymphocytes 21      % Monocytes 13      % Eosinophils 9      % Basophils 1      % Immature Granulocytes 0      NRBCs per 100 WBC 0      Absolute Neutrophils 4.4      Absolute Lymphocytes 1.7      Absolute Monocytes 1.0      Absolute Eosinophils 0.7      Absolute Basophils 0.0      Absolute Immature Granulocytes 0.0      Absolute NRBCs 0.0     ISTAT CREATININE POCT      Emergency Department Course & Assessments:     Interventions:  Medications   iopamidol (ISOVUE-370) solution 80 mL (80 mLs Intravenous Given 1/13/23 0250)   Saline Flush (80 mLs Intravenous Given 1/13/23 0250)      Independent Interpretation (X-rays, CTs, rhythm strip):  I reviewed the patient's aortic survey CT.      Consultations/Discussion of Management or Tests:  0223 I obtained history and performed an exam of the patient as documented above.  0346 I rechecked the patient and explained findings. Additional history added to the HPI above.   0512 I rechecked the patient and discussed plan of care.    Disposition:  The patient was discharged to home.     Impression & Plan    Medical Decision Making:  Karina Miranda is an 82-year-old female presenting to the emergency department with chest pain.  It did not radiate.  Given her history of type a dissection we did an i-STAT creatinine which was normal and then sent her to CT immediately for an aortic survey.  There is fortunately no acute abnormalities such as aortic dissection, pneumonia, pericardial effusion, pleural effusion, mass, pneumothorax.  She is not acutely anemic nor does she have any metabolic derangement.  Delta  troponins were done on her and these are unchanged.  EKG does not show STEMI or ischemic pattern or arrhythmia.    I suspect this is probably more chest wall pain given when she mimics doing the backstroke this causes her pain.  Given her work-up here today I feel she is safe for discharge she will follow-up closely with her cardiologist as well as her new primary care doctor.    Diagnosis:    ICD-10-CM    1. Chest wall pain  R07.89            Discharge Medications:  New Prescriptions    No medications on file      Scribe Disclosure:  I, Najma Perales, am serving as a scribe at 2:15 AM on 1/13/2023 to document services personally performed by Sussy Vidal MD based on my observations and the provider's statements to me.     Sussy Vidal MD  01/13/23 0574

## 2023-01-30 ENCOUNTER — HOSPITAL ENCOUNTER (OUTPATIENT)
Dept: MAMMOGRAPHY | Facility: CLINIC | Age: 83
Discharge: HOME | End: 2023-01-30
Admitting: INTERNAL MEDICINE
Payer: COMMERCIAL

## 2023-01-30 ENCOUNTER — OFFICE VISIT (OUTPATIENT)
Dept: FAMILY MEDICINE | Facility: CLINIC | Age: 83
End: 2023-01-30
Payer: COMMERCIAL

## 2023-01-30 VITALS
RESPIRATION RATE: 16 BRPM | DIASTOLIC BLOOD PRESSURE: 52 MMHG | BODY MASS INDEX: 22.84 KG/M2 | SYSTOLIC BLOOD PRESSURE: 124 MMHG | OXYGEN SATURATION: 97 % | WEIGHT: 121 LBS | TEMPERATURE: 96.9 F | HEART RATE: 57 BPM | HEIGHT: 61 IN

## 2023-01-30 DIAGNOSIS — I10 HTN (HYPERTENSION), BENIGN: ICD-10-CM

## 2023-01-30 DIAGNOSIS — L91.8 SKIN TAG: ICD-10-CM

## 2023-01-30 DIAGNOSIS — Z00.00 ANNUAL PHYSICAL EXAM: Primary | ICD-10-CM

## 2023-01-30 DIAGNOSIS — Z12.31 VISIT FOR SCREENING MAMMOGRAM: ICD-10-CM

## 2023-01-30 DIAGNOSIS — G47.33 OSA (OBSTRUCTIVE SLEEP APNEA): ICD-10-CM

## 2023-01-30 DIAGNOSIS — Z90.5 SINGLE KIDNEY: ICD-10-CM

## 2023-01-30 DIAGNOSIS — I71.010 DISSECTION OF ASCENDING AORTA (H): ICD-10-CM

## 2023-01-30 DIAGNOSIS — Z78.0 ASYMPTOMATIC POSTMENOPAUSAL STATUS: ICD-10-CM

## 2023-01-30 DIAGNOSIS — N18.31 STAGE 3A CHRONIC KIDNEY DISEASE (H): ICD-10-CM

## 2023-01-30 DIAGNOSIS — F51.01 PRIMARY INSOMNIA: ICD-10-CM

## 2023-01-30 PROBLEM — F32.0 MILD MAJOR DEPRESSION (H): Status: RESOLVED | Noted: 2022-01-25 | Resolved: 2023-01-30

## 2023-01-30 LAB
ALBUMIN SERPL BCG-MCNC: 4.5 G/DL (ref 3.5–5.2)
ALP SERPL-CCNC: 68 U/L (ref 35–104)
ALT SERPL W P-5'-P-CCNC: 24 U/L (ref 10–35)
ANION GAP SERPL CALCULATED.3IONS-SCNC: 11 MMOL/L (ref 7–15)
AST SERPL W P-5'-P-CCNC: 25 U/L (ref 10–35)
BILIRUB SERPL-MCNC: 1.2 MG/DL
BUN SERPL-MCNC: 16.3 MG/DL (ref 8–23)
CALCIUM SERPL-MCNC: 10 MG/DL (ref 8.8–10.2)
CHLORIDE SERPL-SCNC: 103 MMOL/L (ref 98–107)
CHOLEST SERPL-MCNC: 219 MG/DL
CREAT SERPL-MCNC: 0.82 MG/DL (ref 0.51–0.95)
DEPRECATED HCO3 PLAS-SCNC: 25 MMOL/L (ref 22–29)
GFR SERPL CREATININE-BSD FRML MDRD: 72 ML/MIN/1.73M2
GLUCOSE SERPL-MCNC: 116 MG/DL (ref 70–99)
HDLC SERPL-MCNC: 85 MG/DL
LDLC SERPL CALC-MCNC: 100 MG/DL
NONHDLC SERPL-MCNC: 134 MG/DL
POTASSIUM SERPL-SCNC: 4.7 MMOL/L (ref 3.4–5.3)
PROT SERPL-MCNC: 6.9 G/DL (ref 6.4–8.3)
SODIUM SERPL-SCNC: 139 MMOL/L (ref 136–145)
TRIGL SERPL-MCNC: 170 MG/DL

## 2023-01-30 PROCEDURE — 77067 SCR MAMMO BI INCL CAD: CPT

## 2023-01-30 PROCEDURE — 99214 OFFICE O/P EST MOD 30 MIN: CPT | Mod: 25 | Performed by: INTERNAL MEDICINE

## 2023-01-30 PROCEDURE — 80053 COMPREHEN METABOLIC PANEL: CPT | Performed by: INTERNAL MEDICINE

## 2023-01-30 PROCEDURE — 36415 COLL VENOUS BLD VENIPUNCTURE: CPT | Performed by: INTERNAL MEDICINE

## 2023-01-30 PROCEDURE — 80061 LIPID PANEL: CPT | Performed by: INTERNAL MEDICINE

## 2023-01-30 PROCEDURE — G0439 PPPS, SUBSEQ VISIT: HCPCS | Performed by: INTERNAL MEDICINE

## 2023-01-30 RX ORDER — TRAZODONE HYDROCHLORIDE 50 MG/1
50 TABLET, FILM COATED ORAL
Qty: 90 TABLET | Refills: 1 | Status: CANCELLED | OUTPATIENT
Start: 2023-01-30

## 2023-01-30 ASSESSMENT — ENCOUNTER SYMPTOMS
NAUSEA: 0
JOINT SWELLING: 0
BREAST MASS: 0
FREQUENCY: 0
WEAKNESS: 0
COUGH: 0
HEADACHES: 0
NERVOUS/ANXIOUS: 0
HEMATURIA: 0
ABDOMINAL PAIN: 0
PARESTHESIAS: 0
SORE THROAT: 0
CHILLS: 0
DIARRHEA: 0
EYE PAIN: 0
HEARTBURN: 0
FEVER: 0
PALPITATIONS: 0
HEMATOCHEZIA: 0
SHORTNESS OF BREATH: 0
DYSURIA: 0
MYALGIAS: 0
DIZZINESS: 0
ARTHRALGIAS: 0
CONSTIPATION: 0

## 2023-01-30 ASSESSMENT — ACTIVITIES OF DAILY LIVING (ADL): CURRENT_FUNCTION: NO ASSISTANCE NEEDED

## 2023-01-30 ASSESSMENT — PAIN SCALES - GENERAL: PAINLEVEL: NO PAIN (0)

## 2023-01-30 NOTE — PROGRESS NOTES
"SUBJECTIVE:   Karina is a 82 year old who presents for Preventive Visit.    Patient has been advised of split billing requirements and indicates understanding: Yes  Are you in the first 12 months of your Medicare coverage?  No    Healthy Habits:     In general, how would you rate your overall health?  Good    Frequency of exercise:  1 day/week    Duration of exercise:  Less than 15 minutes    Do you usually eat at least 4 servings of fruit and vegetables a day, include whole grains    & fiber and avoid regularly eating high fat or \"junk\" foods?  Yes    Taking medications regularly:  Yes    Medication side effects:  None    Ability to successfully perform activities of daily living:  No assistance needed    Home Safety:  Lack of grab bars in the bathroom    Hearing Impairment:  No hearing concerns    In the past 6 months, have you been bothered by leaking of urine?  No    In general, how would you rate your overall mental or emotional health?  Excellent      PHQ-2 Total Score: 0    Additional concerns today:  Yes    Karina is a very pleasant 82 year old lady who presented to the clinic for APE.    She has hx of aortic dissection during a flight - she was working as a  - this happened 10 years ago, she was treated surgically at a local hospital.   Last week she went to the ER for chest pain and CTA showed no new aortic lesions.     She has sleep apnea, not on CPAP, would like to get one.     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   Former smoker: quit in 1974, after smoking for 10 years.   ETOH: 5 drinks per week.  She lives by herself, retired from  job 10 years ago, likes to travel.     Have you ever done Advance Care Planning? (For example, a Health Directive, POLST, or a discussion with a medical provider or your loved ones about your wishes): Yes, patient states has an Advance " Care Planning document and will bring a copy to the clinic.       Fall risk  Fallen 2 or more times in the past year?: No  Any fall with injury in the past year?: No    Cognitive Screening   1) Repeat 3 items (Leader, Season, Table)    2) Clock draw: NORMAL  3) 3 item recall: Recalls 3 objects  Results: 3 items recalled: COGNITIVE IMPAIRMENT LESS LIKELY    Mini-CogTM Copyright STAR Ibrahim. Licensed by the author for use in [facility]; reprinted with permission (shavon@[facility]). All rights reserved.      Do you have sleep apnea, excessive snoring or daytime drowsiness?: yes    Social History     Tobacco Use     Smoking status: Former     Types: Cigarettes     Quit date: 1974     Years since quittin.1     Smokeless tobacco: Never     Tobacco comments:     light smoker    Substance Use Topics     Alcohol use: Yes     Alcohol/week: 3.0 standard drinks     Types: 3 Glasses of wine per week     Comment: I glass wine with dinner     If you drink alcohol do you typically have >3 drinks per day or >7 drinks per week? No    Alcohol Use 2023   Prescreen: >3 drinks/day or >7 drinks/week? No   Prescreen: >3 drinks/day or >7 drinks/week? -       Current providers sharing in care for this patient include:   Patient Care Team:  Susan Pena PA-C as PCP - General (Internal Medicine)  Ricky Puri MD as MD (Cardiology)  Kenn Jean-Baptiste MD as Assigned Heart and Vascular Provider  Ilia Ellis PA-C as Assigned PCP    The following health maintenance items are reviewed in Epic and correct as of today:  Health Maintenance   Topic Date Due     ANNUAL REVIEW OF  ORDERS  Never done     ZOSTER IMMUNIZATION (2 of 3) 2014     MICROALBUMIN  2019     LIPID  2023     MEDICARE ANNUAL WELLNESS VISIT  2023     PHQ-9  2023     BMP  2024     HEMOGLOBIN  2024     FALL RISK ASSESSMENT  2024     ADVANCE CARE PLANNING  2027     DTAP/TDAP/TD  "IMMUNIZATION (3 - Td or Tdap) 07/30/2029     DEXA  04/19/2032     DEPRESSION ACTION PLAN  Completed     INFLUENZA VACCINE  Completed     Pneumococcal Vaccine: 65+ Years  Completed     URINALYSIS  Completed     COVID-19 Vaccine  Completed     IPV IMMUNIZATION  Aged Out     MENINGITIS IMMUNIZATION  Aged Out     MAMMO SCREENING  Discontinued     LUNG CANCER SCREENING  Discontinued     Mammogram Screening - Mammography discussed and declined  Pertinent mammograms are reviewed under the imaging tab.    Review of Systems   Constitutional: Negative for chills and fever.   HENT: Negative for congestion, ear pain, hearing loss and sore throat.    Eyes: Negative for pain and visual disturbance.   Respiratory: Negative for cough and shortness of breath.    Cardiovascular: Negative for chest pain, palpitations and peripheral edema.   Gastrointestinal: Negative for abdominal pain, constipation, diarrhea, heartburn, hematochezia and nausea.   Breasts:  Negative for tenderness, breast mass and discharge.   Genitourinary: Negative for dysuria, frequency, genital sores, hematuria, pelvic pain, urgency, vaginal bleeding and vaginal discharge.   Musculoskeletal: Negative for arthralgias, joint swelling and myalgias.   Skin: Negative for rash.   Neurological: Negative for dizziness, weakness, headaches and paresthesias.   Psychiatric/Behavioral: Negative for mood changes. The patient is not nervous/anxious.      OBJECTIVE:   There were no vitals taken for this visit. Estimated body mass index is 22.11 kg/m  as calculated from the following:    Height as of 1/13/23: 1.549 m (5' 1\").    Weight as of 1/13/23: 53.1 kg (117 lb).  Physical Exam  Vitals reviewed.   Constitutional:       Appearance: Normal appearance.   HENT:      Right Ear: Tympanic membrane normal. There is no impacted cerumen.      Left Ear: Tympanic membrane normal. There is no impacted cerumen.      Mouth/Throat:      Mouth: Mucous membranes are moist.      Pharynx: " Oropharynx is clear. No oropharyngeal exudate or posterior oropharyngeal erythema.   Neck:      Vascular: No carotid bruit.   Cardiovascular:      Rate and Rhythm: Normal rate and regular rhythm.      Heart sounds: Normal heart sounds. No murmur heard.    No gallop.   Pulmonary:      Effort: Pulmonary effort is normal. No respiratory distress.      Breath sounds: Normal breath sounds. No stridor. No wheezing, rhonchi or rales.   Abdominal:      General: Abdomen is flat. There is no distension.      Palpations: Abdomen is soft. There is no mass.      Tenderness: There is no abdominal tenderness. There is no guarding.      Hernia: No hernia is present.   Musculoskeletal:         General: Normal range of motion.      Cervical back: Normal range of motion and neck supple. No rigidity or tenderness.      Right lower leg: No edema.      Left lower leg: No edema.   Lymphadenopathy:      Cervical: No cervical adenopathy.   Skin:     General: Skin is warm.   Neurological:      General: No focal deficit present.      Mental Status: She is alert.   Psychiatric:         Mood and Affect: Mood normal.       ASSESSMENT / PLAN:   Karina was seen today for physical and skin tags.    Diagnoses and all orders for this visit:    Annual physical exam  -     REVIEW OF HEALTH MAINTENANCE PROTOCOL ORDERS  -     COMPREHENSIVE METABOLIC PANEL; Future  -     Lipid Profile; Future    Stage 3a chronic kidney disease (H)  Stable.     Primary insomnia  Stable. Continue trazodone.     Dissection of ascending aorta  Stable. Follows with vascular surgery every year. Blood pressure well controlled.  On rosuvastatin 5 mg daily.    Visit for screening mammogram  -     Cancel: *MA Screening Digital Bilateral; Future    ASH (obstructive sleep apnea)(mild AHI=14)  History of sleep apnea, wants to discuss management other than CPAP mask  -     Adult Sleep Eval & Management Referral; Future    Asymptomatic postmenopausal status  -     DX Hip/Pelvis/Spine;  Future    HTN (hypertension), benign  Well-controlled.    Single kidney  Right kidney intact.  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    Skin tag  -     DE SKIN TAGS    Patient has been advised of split billing requirements and indicates understanding: Yes      COUNSELING:  Reviewed preventive health counseling, as reflected in patient instructions       Fall risk prevention        She reports that she quit smoking about 49 years ago. She has never used smokeless tobacco.      Appropriate preventive services were discussed with this patient, including applicable screening as appropriate for cardiovascular disease, diabetes, osteopenia/osteoporosis, and glaucoma.  As appropriate for age/gender, discussed screening for colorectal cancer, prostate cancer, breast cancer, and cervical cancer. Checklist reviewing preventive services available has been given to the patient.    Reviewed patients plan of care and provided an AVS. The Basic Care Plan (routine screening as documented in Health Maintenance) for Karina meets the Care Plan requirement. This Care Plan has been established and reviewed with the Patient.    AGUSTO PATEL MD  [facility]    Identified Health Risks:

## 2023-01-30 NOTE — PATIENT INSTRUCTIONS
There is this is a new shingles vaccine available called shingrex  It is a series of 2 shots 2-6 months apart.  Considered more than 90% effective.  Please go to any pharmacy to get the  vaccine    You are due for mammogram.  Please call the following number to make appointment :  [phone]  It is located in [address]

## 2023-02-10 ENCOUNTER — MYC MEDICAL ADVICE (OUTPATIENT)
Dept: CARDIOLOGY | Facility: CLINIC | Age: 83
End: 2023-02-10
Payer: COMMERCIAL

## 2023-03-31 ENCOUNTER — TRANSFERRED RECORDS (OUTPATIENT)
Dept: HEALTH INFORMATION MANAGEMENT | Facility: CLINIC | Age: 83
End: 2023-03-31
Payer: COMMERCIAL

## 2023-05-18 ENCOUNTER — OFFICE VISIT (OUTPATIENT)
Dept: SLEEP MEDICINE | Facility: CLINIC | Age: 83
End: 2023-05-18
Attending: INTERNAL MEDICINE
Payer: COMMERCIAL

## 2023-05-18 VITALS
HEIGHT: 61 IN | SYSTOLIC BLOOD PRESSURE: 136 MMHG | BODY MASS INDEX: 21.86 KG/M2 | WEIGHT: 115.8 LBS | OXYGEN SATURATION: 96 % | HEART RATE: 60 BPM | DIASTOLIC BLOOD PRESSURE: 67 MMHG

## 2023-05-18 DIAGNOSIS — G47.33 OSA (OBSTRUCTIVE SLEEP APNEA): Primary | ICD-10-CM

## 2023-05-18 DIAGNOSIS — G47.00 INSOMNIA, UNSPECIFIED TYPE: ICD-10-CM

## 2023-05-18 DIAGNOSIS — I10 HTN (HYPERTENSION), BENIGN: ICD-10-CM

## 2023-05-18 PROCEDURE — 99205 OFFICE O/P NEW HI 60 MIN: CPT | Performed by: PHYSICIAN ASSISTANT

## 2023-05-18 ASSESSMENT — SLEEP AND FATIGUE QUESTIONNAIRES
HOW LIKELY ARE YOU TO NOD OFF OR FALL ASLEEP WHEN YOU ARE A PASSENGER IN A CAR FOR AN HOUR WITHOUT A BREAK: SLIGHT CHANCE OF DOZING
HOW LIKELY ARE YOU TO NOD OFF OR FALL ASLEEP WHILE LYING DOWN TO REST IN THE AFTERNOON WHEN CIRCUMSTANCES PERMIT: HIGH CHANCE OF DOZING
HOW LIKELY ARE YOU TO NOD OFF OR FALL ASLEEP WHILE WATCHING TV: MODERATE CHANCE OF DOZING
HOW LIKELY ARE YOU TO NOD OFF OR FALL ASLEEP WHILE SITTING AND TALKING TO SOMEONE: WOULD NEVER DOZE
HOW LIKELY ARE YOU TO NOD OFF OR FALL ASLEEP WHILE SITTING INACTIVE IN A PUBLIC PLACE: SLIGHT CHANCE OF DOZING
HOW LIKELY ARE YOU TO NOD OFF OR FALL ASLEEP WHILE SITTING QUIETLY AFTER LUNCH WITHOUT ALCOHOL: MODERATE CHANCE OF DOZING
HOW LIKELY ARE YOU TO NOD OFF OR FALL ASLEEP WHILE SITTING AND READING: MODERATE CHANCE OF DOZING
HOW LIKELY ARE YOU TO NOD OFF OR FALL ASLEEP IN A CAR, WHILE STOPPED FOR A FEW MINUTES IN TRAFFIC: SLIGHT CHANCE OF DOZING

## 2023-05-18 NOTE — NURSING NOTE
"Chief Complaint   Patient presents with     Snoring     snoring       Initial /67   Pulse 60   Ht 1.549 m (5' 1\")   Wt 52.5 kg (115 lb 12.8 oz)   SpO2 96%   BMI 21.88 kg/m   Estimated body mass index is 21.88 kg/m  as calculated from the following:    Height as of this encounter: 1.549 m (5' 1\").    Weight as of this encounter: 52.5 kg (115 lb 12.8 oz).    Medication Reconciliation: complete  ESS 12  Abe Wong MA    "

## 2023-05-18 NOTE — PATIENT INSTRUCTIONS
"        MY TREATMENT INFORMATION FOR SLEEP APNEA-  Karina Lopez    Am I having a home sleep study?  --->Watch the video for the device you are using:    -/drop off device-   https://[email or web address].com/watch?v=yGGFBdELGhk    Frequently asked questions:  1. What is Obstructive Sleep Apnea (ASH)? ASH is the most common type of sleep apnea. Apnea means, \"without breath.\"  Apnea is most often caused by narrowing or collapse of the upper airway as muscles relax during sleep.   Almost everyone has occasional apneas. Most people with sleep apnea have had brief interruptions at night frequently for many years.  The severity of sleep apnea is related to how frequent and severe the events are.   2. What are the consequences of ASH? Symptoms include: feeling sleepy during the day, snoring loudly, gasping or stopping of breathing, trouble sleeping, and occasionally morning headaches or heartburn at night.  Sleepiness can be serious and even increase the risk of falling asleep while driving. Other health consequences may include development of high blood pressure and other cardiovascular disease in persons who are susceptible. Untreated ASH  can contribute to heart disease, stroke and diabetes.   3. What are the treatment options? In most situations, sleep apnea is a lifelong disease that must be managed with daily therapy. Medications are not effective for sleep apnea and surgery is generally not considered until other therapies have been tried. Your treatment is your choice . Continuous Positive Airway (CPAP) works right away and is the therapy that is effective in nearly everyone. An oral device to hold your jaw forward is usually the next most reliable option. Other options include postioning devices (to keep you off your back), weight loss, and surgery including a tongue pacing device. There is more detail about some of these options below.  4. Are my sleep studies covered by insurance? Although we will request " verification of coverage, we advise you also check in advance of the study to ensure there is coverage.    Important tips for those choosing CPAP and similar devices  For new devices, sign up for device ESTEBAN to monitor your device for your followup visits  We encourage you to utilize the [employer] esteban or website (myAir web (resmed.com) ) to monitor your therapy progress and share the data with your healthcare team when you discuss your sleep apnea.                                                    Know your equipment:  CPAP is continuous positive airway pressure that prevents obstructive sleep apnea by keeping the throat from collapsing while you are sleeping. In most cases, the device is  smart  and can slowly self-adjusts if your throat collapses and keeps a record every day of how well you are treated-this information is available to you and your care team.  BPAP is bilevel positive airway pressure that keeps your throat open and also assists each breath with a pressure boost to maintain adequate breathing.  Special kinds of BPAP are used in patients who have inadequate breathing from lung or heart disease. In most cases, the device is  smart  and can slowly self-adjusts to assist breathing. Like CPAP, the device keeps a record of how well you are treated.  Your mask is your connection to the device. You get to choose what feels most comfortable and the staff will help to make sure if fits. Here: are some examples of the different masks that are available:       Key points to remember on your journey with sleep apnea:  Sleep study.  PAP devices often need to be adjusted during a sleep study to show that they are effective and adjusted right.  Good tips to remember: Try wearing just the mask during a quiet time during the day so your body adapts to wearing it. A humidifier is recommended for comfort in most cases to prevent drying of your nose and throat. Allergy medication from your provider may help you if  you are having nasal congestion.  Getting settled-in. It takes more than one night for most of us to get used to wearing a mask. Try wearing just the mask during a quiet time during the day so your body adapts to wearing it. A humidifier is recommended for comfort in most cases. Our team will work with you carefully on the first day and will be in contact within 4 days and again at 2 and 4 weeks for advice and remote device adjustments. Your therapy is evaluated by the device each day.   Use it every night. The more you are able to sleep naturally for 7-8 hours, the more likely you will have good sleep and to prevent health risks or symptoms from sleep apnea. Even if you use it 4 hours it helps. Occasionally all of us are unable to use a medical therapy, in sleep apnea, it is not dangerous to miss one night.   Communicate. Call our skilled team on the number provided on the first day if your visit for problems that make it difficult to wear the device. Over 2 out of 3 patients can learn to wear the device long-term with help from our team. Remember to call our team or your sleep providers if you are unable to wear the device as we may have other solutions for those who cannot adapt to mask CPAP therapy. It is recommended that you sleep your sleep provider within the first 3 months and yearly after that if you are not having problems.   Use it for your health. We encourage use of CPAP masks during daytime quiet periods to allow your face and brain to adapt to the sensation of CPAP so that it will be a more natural sensation to awaken to at night or during naps. This can be very useful during the first few weeks or months of adapting to CPAP though it does not help medically to wear CPAP during wakefulness and  should not be used as a strategy just to meet guidelines.  Take care of your equipment. Make sure you clean your mask and tubing using directions every day and that your filter and mask are replaced as  recommended or if they are not working.     BESIDES CPAP, WHAT OTHER THERAPIES ARE THERE?    Positioning Device  Positioning devices are generally used when sleep apnea is mild and only occurs on your back.This example shows a pillow that straps around the waist. It may be appropriate for those whose sleep study shows milder sleep apnea that occurs primarily when lying flat on one's back. Preliminary studies have shown benefit but effectiveness at home may need to be verified by a home sleep test. These devices are generally not covered by medical insurance.  Examples of devices that maintain sleeping on the back to prevent snoring and mild sleep apnea.    Belt type body positioner  http://[employer]/    Electronic reminder  http://nightshifttherapy.com/            Oral Appliance  What is oral appliance therapy?  An oral appliance device fits on your teeth at night like a retainer used after having braces. The device is made by a specialized dentist and requires several visits over 1-2 months before a manufactured device is made to fit your teeth and is adjusted to prevent your sleep apnea. Once an oral device is working properly, snoring should be improved. A home sleep test may be recommended at that time if to determine whether the sleep apnea is adequately treated.       Some things to remember:  -Oral devices are often, but not always, covered by your medical insurance. Be sure to check with your insurance provider.   -If you are referred for oral therapy, you will be given a list of specialized dentists to consider or you may choose to visit the Web site of the American Academy of Dental Sleep Medicine  -Oral devices are less likely to work if you have severe sleep apnea or are extremely overweight.     More detailed information  An oral appliance is a small acrylic device that fits over the upper and lower teeth  (similar to a retainer or a mouth guard). This device slightly moves jaw forward, which moves  the base of the tongue forward, opens the airway, improves breathing for effective treat snoring and obstructive sleep apnea in perhaps 7 out of 10 people .  The best working devices are custom-made by a dental device  after a mold is made of the teeth 1, 2, 3.  When is an oral appliance indicated?  Oral appliance therapy is recommended as a first-line treatment for patients with primary snoring, mild sleep apnea, and for patients with moderate sleep apnea who prefer appliance therapy to use of CPAP4, 5. Severity of sleep apnea is determined by sleep testing and is based on the number of respiratory events per hour of sleep.   How successful is oral appliance therapy?  The success rate of oral appliance therapy in patients with mild sleep apnea is 75-80% while in patients with moderate sleep apnea it is 50-70%. The chance of success in patients with severe sleep apnea is 40-50%. The research also shows that oral appliances have a beneficial effect on the cardiovascular health of ASH patients at the same magnitude as CPAP therapy7.  Oral appliances should be a second-line treatment in cases of severe sleep apnea, but if not completely successful then a combination therapy utilizing CPAP plus oral appliance therapy may be effective. Oral appliances tend to be effective in a broad range of patients although studies show that the patients who have the highest success are females, younger patients, those with milder disease, and less severe obesity. 3, 6.   Finding a dentist that practices dental sleep medicine  Specific training is available through the American Academy of Dental Sleep Medicine for dentists interested in working in the field of sleep. To find a dentist who is educated in the field of sleep and the use of oral appliances, near you, visit the Web site of the American Academy of Dental Sleep Medicine.    References  1. jarret Kolb al. Objectively measured vs self-reported compliance during  oral appliance therapy for sleep-disordered breathing. Chest 2013; 144(5): 5767-1195.  2. Lyssa, et al. Objective measurement of compliance during oral appliance therapy for sleep-disordered breathing. Thorax 2013; 68(1): 91-96.  3. Ehsan et al. Mandibular advancement devices in 620 men and women with ASH and snoring: tolerability and predictors of treatment success. Chest 2004; 125: 4849-5473.  4. Venkata, et al. Oral appliances for snoring and ASH: a review. Sleep 2006; 29: 244-262.  5. Cynthia et al. Oral appliance treatment for ASH: an update. J Clin Sleep Med 2014; 10(2): 215-227.  6. Jose et al. Predictors of OSAH treatment outcome. J Dent Res 2007; 86: 8221-2409.      Weight Loss:    Weight loss is a long-term strategy that may improve sleep apnea in some patients.    Weight management is a personal decision and the decision should be based on your interest and the potential benefits.  If you are interested in exploring weight loss strategies, the following discussion covers the impact on weight loss on sleep apnea and the approaches that may be successful.    Being overweight does not necessarily mean you will have health consequences.  Those who have BMI over 35 or over 27 with existing medical conditions carries greater risk.   Weight loss decreases severity of sleep apnea in most people with obesity. For those with mild obesity who have developed snoring with weight gain, even 15-30 pound weight loss can improve and occasionally eliminate sleep apnea.  Structured and life-long dietary and health habits are necessary to lose weight and keep healthier weight levels.     Though there may be significant health benefits from weight loss, long-term weight loss is very difficult to achieve- studies show success with dietary management in less than 10% of people. In addition, substantial weight loss may require years of dietary control and may be difficult if patients have severe obesity. In  these cases, surgical management may be considered.  Finally, older individuals who have tolerated obesity without health complications may be less likely to benefit from weight loss strategies.      [unfilled]    Surgery:    Surgery for obstructive sleep apnea is considered generally only when other therapies fail to work. Surgery may be discussed with you if you are having a difficult time tolerating CPAP and or when there is an abnormal structure that requires surgical correction.  Nose and throat surgeries often enlarge the airway to prevent collapse.  Most of these surgeries create pain for 1-2 weeks and up to half of the most common surgeries are not effective throughout life.  You should carefully discuss the benefits and drawbacks to surgery with your sleep provider and surgeon to determine if it is the best solution for you.   More information  Surgery for ASH is directed at areas that are responsible for narrowing or complete obstruction of the airway during sleep.  There are a wide range of procedures available to enlarge and/or stabilize the airway to prevent blockage of breathing in the three major areas where it can occur: the palate, tongue, and nasal regions.  Successful surgical treatment depends on the accurate identification of the factors responsible for obstructive sleep apnea in each person.  A personalized approach is required because there is no single treatment that works well for everyone.  Because of anatomic variation, consultation with an examination by a sleep surgeon is a critical first step in determining what surgical options are best for each patient.  In some cases, examination during sedation may be recommended in order to guide the selection of procedures.  Patients will be counseled about risks and benefits as well as the typical recovery course after surgery. Surgery is typically not a cure for a person s ASH.  However, surgery will often significantly improve one s ASH  severity (termed  success rate ).  Even in the absence of a cure, surgery will decrease the cardiovascular risk associated with OSA7; improve overall quality of life8 (sleepiness, functionality, sleep quality, etc).      Palate Procedures:  Patients with ASH often have narrowing of their airway in the region of their tonsils and uvula.  The goals of palate procedures are to widen the airway in this region as well as to help the tissues resist collapse.  Modern palate procedure techniques focus on tissue conservation and soft tissue rearrangement, rather than tissue removal.  Often the uvula is preserved in this procedure. Residual sleep apnea is common in patient after pharyngoplasty with an average reduction in sleep apnea events of 33%2.      Tongue Procedures:  ExamWhile patients are awake, the muscles that surround the throat are active and keep this region open for breathing. These muscles relax during sleep, allowing the tongue and other structures to collapse and block breathing.  There are several different tongue procedures available.  Selection of a tongue base procedure depends on characteristics seen on physical exam.  Generally, procedures are aimed at removing bulky tissues in this area or preventing the back of the tongue from falling back during sleep.  Success rates for tongue surgery range from 50-62%3.    Hypoglossal Nerve Stimulation:  Hypoglossal nerve stimulation has recently received approval from the [place] Food and Drug Administration for the treatment of obstructive sleep apnea.  This is based on research showing that the system was safe and effective in treating sleep apnea6.  Results showed that the median AHI score decreased 68%, from 29.3 to 9.0. This therapy uses an implant system that senses breathing patterns and delivers mild stimulation to airway muscles, which keeps the airway open during sleep.  The system consists of three fully implanted components: a small generator  (similar in size to a pacemaker), a breathing sensor, and a stimulation lead.  Using a small handheld remote, a patient turns the therapy on before bed and off upon awakening.    Candidates for this device must be greater than 18 years of age, have moderate to severe SAH (AHI between 15-65), BMI less than 35, have tried CPAP/oral appliance for at least 8 weeks without success, and have appropriate upper airway anatomy (determined by a sleep endoscopy performed by Dr. Deshaun Tellez).    Hypoglossal Nerve Stimulation Pathway:    The sleep surgeon s office will work with the patient through the insurance prior-authorization process (including communications and appeals).    Nasal Procedures:  Nasal obstruction can interfere with nasal breathing during the day and night.  Studies have shown that relief of nasal obstruction can improve the ability of some patients to tolerate positive airway pressure therapy for obstructive sleep apnea1.  Treatment options include medications such as nasal saline, topical corticosteroid and antihistamine sprays, and oral medications such as antihistamines or decongestants. Non-surgical treatments can include external nasal dilators for selected patients. If these are not successful by themselves, surgery can improve the nasal airway either alone or in combination with these other options.      Combination Procedures:  Combination of surgical procedures and other treatments may be recommended, particularly if patients have more than one area of narrowing or persistent positional disease.  The success rate of combination surgery ranges from 66-80%2,3.    References  Nilsa WORKMAN. The Role of the Nose in Snoring and Obstructive Sleep Apnoea: An Update.  Eur Arch Otorhinolaryngol. 2011; 268: 1365-73.   Ramesh SM; Chantell JA; Thais JR; Pallanch JF; Aubrey BELL; Jeromy DELGADO; Domonique FRANKS. Surgical modifications of the upper airway for obstructive sleep apnea in adults: a systematic review and  meta-analysis. SLEEP 2010;33(10):5662-4090. Diego HAMPTON. Hypopharyngeal surgery in obstructive sleep apnea: an evidence-based medicine review.  Arch Otolaryngol Head Neck Surg. 2006 Feb;132(2):206-13.  Florencio YTRENA, Magdi Y, Newton SEBASTIAN. The efficacy of anatomically based multilevel surgery for obstructive sleep apnea. Otolaryngol Head Neck Surg. 2003 Oct;129(4):327-35.  Kezirian E, Goldberg A. Hypopharyngeal Surgery in Obstructive Sleep Apnea: An Evidence-Based Medicine Review. Arch Otolaryngol Head Neck Surg. 2006 Feb;132(2):206-13.  Agustina PJ et al. Upper-Airway Stimulation for Obstructive Sleep Apnea.  N Engl J Med. 2014 Jan 9;370(2):139-49.  Mike Y et al. Increased Incidence of Cardiovascular Disease in Middle-aged Men with Obstructive Sleep Apnea. Am J Respir Crit Care Med; 2002 166: 159-165  Layne EM et al. Studying Life Effects and Effectiveness of Palatopharyngoplasty (SLEEP) study: Subjective Outcomes of Isolated Uvulopalatopharyngoplasty. Otolaryngol Head Neck Surg. 2011; 144: 623-631.        WHAT IF I ONLY HAVE SNORING?    Mandibular advancement devices, lateral sleep positioning, long-term weight loss and treatment of nasal allergies have been shown to improve snoring.  Exercising tongue muscles with a game (https://apps.[employer].eBusinessCards.com/us/esteban/soundly-reduce-snoring/fw8369683294) or stimulating the tongue during the day with a device (https://doi.org/10.3390/bol83733273) have improved snoring in some individuals.    Remember to Drive Safe... Drive Alive     Sleep health profoundly affects your health, mood, and your safety.  Thirty three percent of the population (one in three of us) is not getting enough sleep and many have a sleep disorder. Not getting enough sleep or having an untreated / undertreated sleep condition may make us sleepy without even knowing it. In fact, our driving could be dramatically impaired due to our sleep health. As your provider, here are some things I would like you to know about  driving:     Here are some warning signs for impairment and dangerous drowsy driving:              -Having been awake more than 16 hours               -Looking tired               -Eyelid drooping              -Head nodding (it could be too late at this point)              -Driving for more than 30 minutes     Some things you could do to make the driving safer if you are experiencing some drowsiness:              -Stop driving and rest              -Call for transportation              -Make sure your sleep disorder is adequately treated     Some things that have been shown NOT to work when experiencing drowsiness while driving:              -Turning on the radio              -Opening windows              -Eating any  distracting  /  entertaining  foods (e.g., sunflower seeds, candy, or any other)              -Talking on the phone      Your decision may not only impact your life, but also the life of others. Please, remember to drive safe for yourself and all of us.

## 2023-05-18 NOTE — PROGRESS NOTES
Outpatient Sleep Medicine Consultation:    Name: Karina Lopez MRN# [id number]   Age: 82 year old YOB: 1940     Date of Consultation: May 18, 2023  Consultation is requested by: Sary Campos MD  [address] [place] [address] Sary Campos  Primary care provider: Sary Campos       Reason for Sleep Consult:     Karina Lopez is sent by Sary Campos for a sleep consultation regarding ASH.    Patient s Reason for visit  Karina MEMO Flanagannancie main reason for visit: apnea  Patient states problem(s) started: 5 years  Karina Lopez's goals for this visit: to see if I qualfy for the latest gadget other than my present cpap the [employer] dream station         Assessment and Plan:     1. ASH (obstructive sleep apnea)(mild AHI=14)  2. Insomnia, unspecified type  3. HTN (hypertension), benign    Patient presents to clinic today to reestablish care of her mild supine predominant sleep apnea.  Patient was previously treated with CPAP therapy but admits she was noncompliant and didn't use very long as she felt that it was very cumbersome.  Presents to clinic today to discuss alternatives to CPAP therapy.  She reports continued snoring, daytime sleepiness (mild ESS 12), insomnia as was her presenting symptoms back in 2015 when initially presented.  She is not interested in going back on the CPAP at all.  We discussed alternatives include mandibular advancement device, positional restriction with sleep positioning device, consideration of surgical options with referral to ENT.  She is not interested in surgery.  Given how strong positional component there is to her sleep apnea has elected strict supie avoidance. Discussed ZZoma pillow, Sleep Noodle, Slumber Bump as options.  Prescription written today for lateral positioning device but she understands unlikely to be covered by insurance.  Instructed her to look online or could call [facility] to see if they have devices for purchase.  We have agreed to  "pursue HST while wearing positional device to verify efficacy. Orders placed today for HST but will plan to pursue late summer with follow-up shortly after, giving her time to get used to the positing device before HST. Insomnia appears well treated overall wtih trazodone but we discussed behavioral changes she can make to improve sleep as well. In particular discussed importance of keeping consistent sleep schedule all days of the week with regular bedtime and wake time. Agreed to bedtime 12:00AM with wake time 8:30AM. As above plan to see her back about 2 weeks after HST is completed with positional device for results discussion. Educational materials provided in instructions.  - Sleep Positioning Device  ()  - HST-Home Sleep Apnea Test - Noxturnal Returnable; Future         History of Present Illness:     Karina Lopez is a 82 year old female with HTN, depression, CKD, history of aortic dissection, macular dystrophy, insomnnia on trazodone and mild ASH previously treated tih CPAP who presents to clinic today to re-establish care. Last seen by Bennett Goltz PAC 10/2017.    Reviewed prior PSG completed 4/7/2015 (126#, BMI 23.2) revealing AHI 14.1, RDI 14.1, supine AHI 70.6, non-supine AHI 2.9, REM (lateral) AHI 6.9, O2 april 82.2%, 3.5 minutes spent <89%. PLM index 39.9 with PLM arousal 0.8. Sleep architecture showed all sleep stages present with normal duration, sleep efficiency 90.7%, reduced sleep latency 2.2 min with trazodone.     Following this positive study patient was started on CPAP but didn't use for very long - \"it was so burdensome and cumbersome and I never really got used to it\".    Presents to my clinic today interested in discussing alternatives to CPAP. States \"I don't have a partner to tell me I snore but I do wake myself up now and then with it so I know I do\". Wakes feeling unrefreshed often in AM. On trazodone for insomnia which is helpful when taken. Wakes once or twice a night for " the bathroom, no issues returning to sleep. Reports inadvertent dozing in afternoons if she sits down to rest, no planned daytime napping.     SLEEP-WAKE SCHEDULE:     Work/School Days: Patient goes to school/work: No   Usually gets into bed at varies between 10 and 1am  Takes patient about varies as i am taking medication for sleep  Has trouble falling asleep without medication often , denies issues falling asleep with trazodone   Wakes up in the middle of the night twice   Wakes up due to Snorting self awake;Use the bathroom  She has trouble falling back asleep 0 times a week.   Patient is usually up at varies between 8 and 9  Uses alarm: Yes    Weekends/Non-work Days/All Other Days:  Usually gets into bed at varies between 10 and 1   Takes patient without meds at least 30 to 45 minutes to fall asleep, again with trazodone not long  Patient is usually up at any time depending on schedule varies greatly  Uses alarm: Yes    Sleep Need  Patient gets 7 hours sleep on average   Patient thinks she needs about more than 7 sleep    Karinaletha Flanagannancie prefers to sleep in this position(s): Side;Head Elevated   Patient states they do the following activities in bed: Read;Use phone, computer, or tablet    Naps  Patient takes a purposeful nap varies i am very tired in afternoon and fall asleep sometimes on couch times a week and naps are usually about 30 minutes or so in duration  She feels better after a nap: Yes  She dozes off unintentionally when i allow myself almost every day days per week  Patient has had a driving accident or near-miss due to sleepiness/drowsiness: No      SLEEP DISRUPTIONS:    Breathing/Snoring  Patient snores:Yes  Other people complain about her snoring: Yes  Patient has been told she stops breathing in her sleep:No  She has issues with the following: Morning mouth dryness;Getting up to urinate more than once    Movement:  Patient gets pain, discomfort, with an urge to move:  No  It happens when she is  resting:  No  It happens more at night:  No  Patient has been told she kicks her legs at night:  No (PLMs seen on prior study)     Behaviors in Sleep:  Denies sleepwalking, sleep talking, sleep eating, bruxism, dream enactment, recurring nightmares, night terrors, sleep paralysis, hypnagogic/hypnopompic hallucinations, cataplexy    CAFFEINE AND OTHER SUBSTANCES:    Patient consumes caffeinated beverages per day:  1 cup of strong coffee  Last caffeine use is usually: morning  List of any prescribed or over the counter stimulants that patient takes:  None  List of any prescribed or over the counter sleep medication patient takes: Trazodone   Patient drinks alcohol to help them sleep: No  Patient drinks alcohol near bedtime: No    Family History:  Patient has a family member been diagnosed with a sleep disorder: Yes  my dad     SCALES:    EPWORTH SLEEPINESS SCALE         5/18/2023     9:34 AM    Gaston Sleepiness Scale ( SAVAGE Garcia  [id number]<br>ESS - [place]/English - Final version - 21 Nov 07 - [facility].)   Sitting and reading Moderate chance of dozing   Watching TV Moderate chance of dozing   Sitting, inactive in a public place (e.g. a theatre or a meeting) Slight chance of dozing   As a passenger in a car for an hour without a break Slight chance of dozing   Lying down to rest in the afternoon when circumstances permit High chance of dozing   Sitting and talking to someone Would never doze   Sitting quietly after a lunch without alcohol Moderate chance of dozing   In a car, while stopped for a few minutes in traffic Slight chance of dozing   Gaston Score (MC) 12   Gaston Score (Sleep) 12         INSOMNIA SEVERITY INDEX (BHUMI)          5/18/2023     9:07 AM   Insomnia Severity Index (BHUMI)   Difficulty falling asleep 2   Difficulty staying asleep 2   Problems waking up too early 2   How SATISFIED/DISSATISFIED are you with your CURRENT sleep pattern? 3   How NOTICEABLE to others do you think your sleep  problem is in terms of impairing the quality of your life? 2   How WORRIED/DISTRESSED are you about your current sleep problem? 3   To what extent do you consider your sleep problem to INTERFERE with your daily functioning (e.g. daytime fatigue, mood, ability to function at work/daily chores, concentration, memory, mood, etc.) CURRENTLY? 2   BHUMI Total Score 16       Guidelines for Scoring/Interpretation:  Total score categories:  0-7 = No clinically significant insomnia   8-14 = Subthreshold insomnia   15-21 = Clinical insomnia (moderate severity)  22-28 = Clinical insomnia (severe)  Used via courtesy of www.[facility].va.gov with permission from Dima Hansen PhD., [facility]    Allergies:    Allergies   Allergen Reactions     Lisinopril      Cough       Simvastatin      myalgias       Medications:    Current Outpatient Medications   Medication Sig Dispense Refill     amLODIPine (NORVASC) 5 MG tablet TAKE 1 TABLET(5 MG) BY MOUTH DAILY 90 tablet 2     Ascorbic Acid (VITAMIN C PO) Take 500 mg by mouth daily       calcium carbonate (OS-ALEXI 500 MG [place]. [place]) 500 MG tablet Take 500 mg by mouth daily       cholecalciferol (VITAMIN D3) 25 mcg (1000 units) capsule Take 1 capsule by mouth daily.       labetalol (NORMODYNE) 200 MG tablet Take 1 tablet (200 mg) by mouth 3 times daily - overdue for fasting annual exam 270 tablet 2     losartan (COZAAR) 50 MG tablet TAKE 1 TABLET(50 MG) BY MOUTH DAILY 90 tablet 2     rosuvastatin (CRESTOR) 5 MG tablet Take 1 tablet (5 mg) by mouth daily 90 tablet 1     traZODone (DESYREL) 50 MG tablet Take 1 tablet (50 mg) by mouth every evening as needed for sleep 90 tablet 1       Problem List:  Patient Active Problem List    Diagnosis Date Noted     Annual physical exam 01/30/2023     Priority: Medium     Dissection of ascending aorta (H) 01/30/2023     Priority: Medium     Visit for screening mammogram 01/30/2023     Priority: Medium     Asymptomatic postmenopausal status 01/30/2023      Priority: Medium     Single kidney 01/30/2023     Priority: Medium     Aortic valve insufficiency, etiology of cardiac valve disease unspecified 04/13/2016     Priority: Medium     Hyperlipidemia LDL goal <100 12/16/2015     Priority: Medium     CKD (chronic kidney disease) stage 3, GFR 30-59 ml/min (H) 12/09/2015     Priority: Medium     ASH (obstructive sleep apnea)(mild AHI=14) 04/12/2015     Priority: Medium     Snoring 02/25/2015     Priority: Medium     Vertigo 02/19/2015     Priority: Medium     Bruit      Priority: Medium     Dizziness      Priority: Medium     Anemia      Priority: Medium     Cough due to ACE inhibitor 10/07/2013     Priority: Medium     Left arm pain 09/26/2013     Priority: Medium     Health Care Home 04/10/2013     Priority: Medium     EMERGENCY CARE PLAN  Presenting Problem Signs and Symptoms Treatment Plan    Questions or conerns during clinic hours    I will call the clinic directly     Questions or conerns outside clinic hours    I will call the 24 hour nurse line at [phone]    Patient needs to schedule an appointment    I will call the 24 hour scheduling team at [phone] or clinic directly    Same day treatment     I will call the clinic first, nurse line if after hours, urgent care and express care if needed                        Carmelina Fernández RN, MS  [facility] Care Coordinator  [phone]           [facility] 12/18/2012     Priority: Medium     HTN (hypertension), benign      Priority: Medium     Hx of repair of dissecting thoracic aortic aneurysm, Vasile type A      Priority: Medium     Repair 2012  Hemashield graft and resuspension of the aortic valve         Advance care planning 08/14/2012     Priority: Medium     Discussed advance care planning with patient; information given to patient to review. 8/14/2012     Honoring choices letter mailed.  9-  RT Tomas (R)         Best vitelliform macular dystrophy      Priority: Medium      Osteoporosis, postmenopausal      Priority: Medium     Insomnia      Priority: Medium        Past Medical/Surgical History:  Past Medical History:   Diagnosis Date     Anemia      Aortic valve insufficiency, etiology of cardiac valve disease unspecified 2016     Best vitelliform macular dystrophy      CKD (chronic kidney disease) stage 3, GFR 30-59 ml/min (H) 2015     Former smoker      HTN (hypertension), benign      Hx of repair of dissecting thoracic aortic aneurysm, Vasile type A     +fibromuscular dysplasia     Hyperlipidemia LDL goal < 130      Insomnia      ASH (obstructive sleep apnea)(mild AHI=14) 2015     Osteoporosis, postmenopausal      Past Surgical History:   Procedure Laterality Date     CARDIAC SURGERY       HC OPEN TX METATARSAL FRACTURE       S/p thoracic aortic aneurysm repair  10/21/12     VASCULAR SURGERY         Social History:  Social History     Socioeconomic History     Marital status: Single     Spouse name: Not on file     Number of children: Not on file     Years of education: Not on file     Highest education level: Not on file   Occupational History     Not on file   Tobacco Use     Smoking status: Former     Types: Cigarettes     Quit date: 1974     Years since quittin.4     Smokeless tobacco: Never     Tobacco comments:     light smoker    Vaping Use     Vaping status: Never Used   Substance and Sexual Activity     Alcohol use: Yes     Alcohol/week: 3.0 standard drinks of alcohol     Types: 3 Glasses of wine per week     Comment: I glass wine with dinner     Drug use: No     Sexual activity: Yes     Partners: Male   Other Topics Concern     Parent/sibling w/ CABG, MI or angioplasty before 65F 55M? No      Service Not Asked     Blood Transfusions Not Asked     Caffeine Concern Yes     Comment: 2 cups  a day     Occupational Exposure Not Asked     Hobby Hazards Not Asked     Sleep Concern Not Asked     Stress Concern Not Asked     Weight Concern Not  "Asked     Special Diet Yes     Back Care Not Asked     Exercise Yes     [facility]     Bike Helmet Not Asked     Seat Belt Not Asked     Self-Exams Not Asked   Social History Narrative    Working as a  PT.     Retired July 2014.     Social Determinants of Health     Financial Resource Strain: Not on file   Food Insecurity: Not on file   Transportation Needs: Not on file   Physical Activity: Not on file   Stress: Not on file   Social Connections: Not on file   Intimate Partner Violence: Not on file   Housing Stability: Not on file       Family History:  Family History   Problem Relation Age of Onset     Hypertension Father         best syndrome     Cancer Father         lung     Hypertension Paternal Grandmother         stroke     Alcohol/Drug Brother         best syndr       Review of Systems:  In the last TWO WEEKS have you experienced any of the following symptoms?  Fevers: No  Night Sweats: No  Pain at Night: No  Changes in Vision: No  Difficulty Breathing through Nose: No  Sore Throat in Morning: No  Dry Mouth in the Morning: Yes  Diarrhea at Night: No  Heartburn at Night: No  Urinating More than Once at Night: Yes  Losing Control of Urine at Night: No  Joint Pains at Night: No  Weakness in Arms or Legs: No  Depressed Mood: No  Anxiety: No     Physical Examination:  Vitals: /67   Pulse 60   Ht 1.549 m (5' 1\")   Wt 52.5 kg (115 lb 12.8 oz)   SpO2 96%   BMI 21.88 kg/m    BMI= Body mass index is 21.88 kg/m .  General appearance: Awake, alert, cooperative. Well groomed. Sitting comfortably in chair. In no apparent distress.  HEENT: Head: Normocephalic, atraumatic. Eyes: PERRL. Conjunctiva clear. Sclera normal. Nose: External appearance normal.  Neck: No data recorded  Skin: No rashes or significant lesions.   Neurologic:Alert, oriented x3. No focal neurological deficit. Gait normal.   Psychiatric: Mood euthymic. Affect congruent with full range and intensity.         Data: All " pertinent previous laboratory data reviewed     Recent Labs   Lab Test 01/30/23  1052 01/13/23  0234 01/13/23  0220     --  138   POTASSIUM 4.7  --  4.1   CHLORIDE 103  --  103   CO2 25  --  29   ANIONGAP 11  --  6   *  --  101*   BUN 16.3  --  21   CR 0.82 1.0 0.93   ALEXI 10.0  --  9.3       Recent Labs   Lab Test 01/13/23  0220   WBC 7.9   RBC 4.15   HGB 12.7   HCT 39.1   MCV 94   MCH 30.6   MCHC 32.5   RDW 13.2          Recent Labs   Lab Test 01/30/23  1052   PROTTOTAL 6.9   ALBUMIN 4.5   BILITOTAL 1.2   ALKPHOS 68   AST 25   ALT 24       TSH   Date Value   09/30/2022 3.25 mU/L   01/25/2022 2.00 mU/L   08/30/2013 1.24 mcU/mL       No results found for: UAMP, UBARB, BENZODIAZEUR, UCANN, UCOC, OPIT, UPCP    Ferritin   Date/Time Value Ref Range Status   04/23/2014 11:10 AM 69 10 - 300 ng/mL Final       No results found for: PH, PHARTERIAL, PO2, [id number], SAT, PCO2, HCO3, BASEEXCESS, MARLA, BEB    @LABRCNTIPR(phv:4,pco2v:4,po2v:4,hco3v:4,tal:4,o2per:4)@    Echocardiology: No results found for this or any previous visit (from the past 4320 hour(s)).    Chest x-ray: No results found for this or any previous visit from the past 365 days.      Chest CT: No results found for this or any previous visit from the past 365 days.      PFT: Most Recent [facility] Pulmonary Function Testing    No results found for: 20001  No results found for: 20002  No results found for: 20003  No results found for: 20015  No results found for: 20016  No results found for: 20027  No results found for: 20028  No results found for: 20029  No results found for: 20079  No results found for: 20080  No results found for: 20081  No results found for: 20335  No results found for: 20105  No results found for: 20053  No results found for: 20054  No results found for: 20055      Amy Alonso PA-C 5/18/2023     [facility]  [address] [address]     [facility]  [address] [facility] [address], [place] [address]    Chart documentation was completed, in part, with [email or web address] voice-recognition software. Even though reviewed, some grammatical, spelling, and word errors may remain.    67 minutes spent on day of encounter reviewing medical records, history and physical examination, review of previous testing and interpretation, documentation and further activities as noted above

## 2023-06-05 ENCOUNTER — OFFICE VISIT (OUTPATIENT)
Dept: CARDIOLOGY | Facility: CLINIC | Age: 83
End: 2023-06-05
Payer: COMMERCIAL

## 2023-06-05 VITALS
WEIGHT: 115.7 LBS | HEART RATE: 63 BPM | SYSTOLIC BLOOD PRESSURE: 105 MMHG | BODY MASS INDEX: 21.84 KG/M2 | DIASTOLIC BLOOD PRESSURE: 57 MMHG | OXYGEN SATURATION: 96 % | HEIGHT: 61 IN

## 2023-06-05 DIAGNOSIS — I35.1 AORTIC VALVE INSUFFICIENCY, ETIOLOGY OF CARDIAC VALVE DISEASE UNSPECIFIED: ICD-10-CM

## 2023-06-05 DIAGNOSIS — E78.5 HYPERLIPIDEMIA LDL GOAL <100: ICD-10-CM

## 2023-06-05 DIAGNOSIS — Z86.79 HISTORY OF AORTIC DISSECTION: ICD-10-CM

## 2023-06-05 DIAGNOSIS — N18.2 CKD (CHRONIC KIDNEY DISEASE) STAGE 2, GFR 60-89 ML/MIN: Primary | ICD-10-CM

## 2023-06-05 PROCEDURE — 99215 OFFICE O/P EST HI 40 MIN: CPT | Performed by: INTERNAL MEDICINE

## 2023-06-05 RX ORDER — ROSUVASTATIN CALCIUM 5 MG/1
5 TABLET, COATED ORAL DAILY
Qty: 90 TABLET | Refills: 3 | Status: SHIPPED | OUTPATIENT
Start: 2023-06-05 | End: 2024-01-31

## 2023-06-05 NOTE — PROGRESS NOTES
REASON FOR VISIT:  Followup for type A aortic dissection, status post repair.      PRIMARY CARE PROVIDER:  Susan Pena.      HISTORY OF PRESENT ILLNESS:  I again had the pleasure of seeing Karina Lopez at the [facility] in [place] this morning.  She is a very pleasant 82-year-old female with history of type A aortic dissection status post repair, hyperlipidemia and hypertension.      In 10/2012 the patient developed acute, tearing chest pain while flying to [place], [place].  At the time, she was working as a .  She was rushed to a local hospital, where she was diagnosed with acute type A aortic dissection.  She underwent emergent repair of the ascending aorta and resuspension of the aortic valve.  Postoperatively, she developed acute renal failure which resolved conservatively.  The aortic dissection extended down to the left external iliac artery.  Luckily, she did not have any blood flow compromised to her visceral organs or spinal cord.  All of the blood vessels to her visceral organs came off the true lumen.  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  Subsequent imaging demonstrated atrophic left kidney with mild perfusion abnormality.  She was seen by Nephrology, and they did not recommend an intervention with regard to the left renal artery stenosis.      Over the years, she has had multiple CTAs. CTA performed on November of 2020 showed patent aortic arch vessels as well as interval complete resolution of the false lumen within the thoracic aorta.  The left renal artery appeared still pinched by the false lumen.  Overall, the CTA findings remained stable.      She went to the emergency department earlier this year with episode of chest discomfort.  She ruled out for acute coronary syndrome.  She underwent CTA of the which revealed stable repair of the ascending thoracic aorta.  The infrarenal abdominal  aorta dissection remained stable but the false lumen appeared near totally thrombosed.    She does not endorse any chest pain or shortness of breath.  She has been noticing dizzy spells over the past 10 days.    IMPRESSION, REPORT AND PLAN:   1.  History of type A aortic dissection, status post repair with resuspension of the aortic valve.   2.  Moderate aortic valve regurgitation.   3.  Hypertension.   4.  Hyperlipidemia.        She remains stable from cardiac point of view.  I reviewed her recent CT angiogram which showed stable thoracic aortic repair.  She does not endorse any cardiopulmonary symptoms at this point.  However, over the last 10 days she has been noticing dizzy spells.  Her blood pressure today in the office is on the lower side at systolic of 105.  It is unclear at this point whether her symptoms are related to low blood pressures.  Advised her to hydrate and keep an eye on her symptoms as well as blood pressures.  If symptoms persist and her blood pressure remains low, then we will decrease the losartan dose or discontinue amlodipine.    Her aortic regurgitation murmur is louder during examination today.  As such, will obtain repeat echocardiogram to reassess severely aortic regurgitation.    Finally she is concerned about her chronic kidney disease although function remained stable over the past year.  She requested referral to nephrology for longitudinal follow-up.       I appreciate the opportunity to be part of this patient's care.          RAMAKRISHNA DIAZ MD        Today's clinic visit entailed:  40 minutes spent by me on the date of the encounter doing chart review, history and exam, documentation and further activities per the note  Provider  Link to [facility] Help Grid       Orders Placed This Encounter   Procedures     Adult Nephrology  Referral     Follow-Up with Cardiology     Echocardiogram Complete       Orders Placed This Encounter   Medications     rosuvastatin (CRESTOR) 5 MG  tablet     Sig: Take 1 tablet (5 mg) by mouth daily     Dispense:  90 tablet     Refill:  3       Medications Discontinued During This Encounter   Medication Reason     rosuvastatin (CRESTOR) 5 MG tablet Reorder (No AVS / No eCancel)         Encounter Diagnoses   Name Primary?     Hyperlipidemia LDL goal <100      CKD (chronic kidney disease) stage 2, GFR 60-89 ml/min Yes     Aortic valve insufficiency, etiology of cardiac valve disease unspecified      History of aortic dissection        CURRENT MEDICATIONS:  Current Outpatient Medications   Medication Sig Dispense Refill     amLODIPine (NORVASC) 5 MG tablet TAKE 1 TABLET(5 MG) BY MOUTH DAILY 90 tablet 2     Ascorbic Acid (VITAMIN C PO) Take 500 mg by mouth daily       calcium carbonate (OS-ALEXI 500 MG [place]. CA) 500 MG tablet Take 500 mg by mouth daily       cholecalciferol (VITAMIN D3) 25 mcg (1000 units) capsule Take 1 capsule by mouth daily.       labetalol (NORMODYNE) 200 MG tablet Take 1 tablet (200 mg) by mouth 3 times daily - overdue for fasting annual exam 270 tablet 2     losartan (COZAAR) 50 MG tablet TAKE 1 TABLET(50 MG) BY MOUTH DAILY 90 tablet 2     rosuvastatin (CRESTOR) 5 MG tablet Take 1 tablet (5 mg) by mouth daily 90 tablet 3     traZODone (DESYREL) 50 MG tablet Take 1 tablet (50 mg) by mouth every evening as needed for sleep 90 tablet 1       ALLERGIES     Allergies   Allergen Reactions     Lisinopril      Cough       Simvastatin      myalgias       PAST MEDICAL HISTORY:  Past Medical History:   Diagnosis Date     Anemia      Aortic valve insufficiency, etiology of cardiac valve disease unspecified 04/13/2016     Best vitelliform macular dystrophy      CKD (chronic kidney disease) stage 3, GFR 30-59 ml/min (H) 12/09/2015     Former smoker      HTN (hypertension), benign      Hx of repair of dissecting thoracic aortic aneurysm, [place] type A     +fibromuscular dysplasia     Hyperlipidemia LDL goal < 130      Insomnia      ASH (obstructive sleep  apnea)(mild AHI=14) 2015     Osteoporosis, postmenopausal        PAST SURGICAL HISTORY:  Past Surgical History:   Procedure Laterality Date     CARDIAC SURGERY       HC OPEN TX METATARSAL FRACTURE       S/p thoracic aortic aneurysm repair  10/21/12     VASCULAR SURGERY         FAMILY HISTORY:  Family History   Problem Relation Age of Onset     Hypertension Father         best syndrome     Cancer Father         lung     Hypertension Paternal Grandmother         stroke     Alcohol/Drug Brother         best syndr       SOCIAL HISTORY:  Social History     Socioeconomic History     Marital status: Single     Spouse name: None     Number of children: None     Years of education: None     Highest education level: None   Tobacco Use     Smoking status: Former     Types: Cigarettes     Quit date: 1974     Years since quittin.4     Smokeless tobacco: Never     Tobacco comments:     light smoker    Vaping Use     Vaping status: Never Used   Substance and Sexual Activity     Alcohol use: Yes     Alcohol/week: 3.0 standard drinks of alcohol     Types: 3 Glasses of wine per week     Comment: I-2 drinks with dinner     Drug use: No     Sexual activity: Yes     Partners: Male   Other Topics Concern     Parent/sibling w/ CABG, MI or angioplasty before 65F 55M? No     Caffeine Concern Yes     Comment: 2 cups  a day     Special Diet Yes     Exercise Yes     [facility]   Social History Narrative    Working as a  PT.     Retired 2014.       Review of Systems:  Skin:  not assessed       Eyes:  not assessed      ENT:  not assessed      Respiratory:  Positive for sleep apnea mild   Cardiovascular:  Negative;palpitations;chest pain;fatigue;lightheadedness Positive for;dizziness had for about 10 days  Gastroenterology: not assessed      Genitourinary:  not assessed      Musculoskeletal:  not assessed      Neurologic:  not assessed      Psychiatric:  not assessed      Heme/Lymph/Imm:  not assessed  "     Endocrine:  Negative thyroid disorder;diabetes      Physical Exam:  Vitals: /57   Pulse 63   Ht 1.549 m (5' 1\")   Wt 52.5 kg (115 lb 11.2 oz)   SpO2 96%   BMI 21.86 kg/m      Constitutional:  in no acute distress        Skin:  warm and dry to the touch, no apparent skin lesions or masses noted          Head:  normocephalic;no masses or lesions        Eyes:  conjunctivae and lids unremarkable        Lymph:      ENT:  dentition good;no pallor or cyanosis        Neck:  JVP normal;no carotid bruit;carotid pulses are full and equal bilaterally        Respiratory:  clear to auscultation         Cardiac: regular rhythm           mid diastolic murmur;grade 1 Normal S1, S2, 1/6 ELISEO, faint holodiastolic murmur at the LUSB, no rubs or gallops  pulses full and equal                                   2+ bilateral femoral, dp, radial and carotid    GI:  abdomen soft;no bruits   soft, non-tender, + bruit    Extremities and Muscular Skeletal:  no edema;no deformities, clubbing, cyanosis, erythema observed              Neurological:  no gross motor deficits        Psych:  Alert and Oriented x 3        CC  No referring provider defined for this encounter.              "

## 2023-06-05 NOTE — LETTER
6/5/2023    AGUSTO PATEL MD  [address]  [facility] [id number]    RE: Karina Flanagannancie       Dear Colleague,     I had the pleasure of seeing Karina Lopez in the [facility].  REASON FOR VISIT:  Followup for type A aortic dissection, status post repair.      PRIMARY CARE PROVIDER:  Susan Pena.      HISTORY OF PRESENT ILLNESS:  I again had the pleasure of seeing Karina Lopez at the [facility] in [place] this morning.  She is a very pleasant 82-year-old female with history of type A aortic dissection status post repair, hyperlipidemia and hypertension.      In 10/2012 the patient developed acute, tearing chest pain while flying to [place], [place].  At the time, she was working as a .  She was rushed to a local hospital, where she was diagnosed with acute type A aortic dissection.  She underwent emergent repair of the ascending aorta and resuspension of the aortic valve.  Postoperatively, she developed acute renal failure which resolved conservatively.  The aortic dissection extended down to the left external iliac artery.  Luckily, she did not have any blood flow compromised to her visceral organs or spinal cord.  All of the blood vessels to her visceral organs came off the true lumen.  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  Subsequent imaging demonstrated atrophic left kidney with mild perfusion abnormality.  She was seen by Nephrology, and they did not recommend an intervention with regard to the left renal artery stenosis.      Over the years, she has had multiple CTAs. CTA performed on November of 2020 showed patent aortic arch vessels as well as interval complete resolution of the false lumen within the thoracic aorta.  The left renal artery appeared still pinched by the false lumen.  Overall, the CTA findings remained stable.      She went to the emergency department  earlier this year with episode of chest discomfort.  She ruled out for acute coronary syndrome.  She underwent CTA of the which revealed stable repair of the ascending thoracic aorta.  The infrarenal abdominal aorta dissection remained stable but the false lumen appeared near totally thrombosed.    She does not endorse any chest pain or shortness of breath.  She has been noticing dizzy spells over the past 10 days.    IMPRESSION, REPORT AND PLAN:   1.  History of type A aortic dissection, status post repair with resuspension of the aortic valve.   2.  Moderate aortic valve regurgitation.   3.  Hypertension.   4.  Hyperlipidemia.        She remains stable from cardiac point of view.  I reviewed her recent CT angiogram which showed stable thoracic aortic repair.  She does not endorse any cardiopulmonary symptoms at this point.  However, over the last 10 days she has been noticing dizzy spells.  Her blood pressure today in the office is on the lower side at systolic of 105.  It is unclear at this point whether her symptoms are related to low blood pressures.  Advised her to hydrate and keep an eye on her symptoms as well as blood pressures.  If symptoms persist and her blood pressure remains low, then we will decrease the losartan dose or discontinue amlodipine.    Her aortic regurgitation murmur is louder during examination today.  As such, will obtain repeat echocardiogram to reassess severely aortic regurgitation.    Finally she is concerned about her chronic kidney disease although function remained stable over the past year.  She requested referral to nephrology for longitudinal follow-up.       I appreciate the opportunity to be part of this patient's care.          RAMAKRISHNA DIAZ MD        Today's clinic visit entailed:  40 minutes spent by me on the date of the encounter doing chart review, history and exam, documentation and further activities per the note  Provider  Link to [facility] Help Grid       Orders Placed  This Encounter   Procedures    Adult Nephrology  Referral    Follow-Up with Cardiology    Echocardiogram Complete       Orders Placed This Encounter   Medications    rosuvastatin (CRESTOR) 5 MG tablet     Sig: Take 1 tablet (5 mg) by mouth daily     Dispense:  90 tablet     Refill:  3       Medications Discontinued During This Encounter   Medication Reason    rosuvastatin (CRESTOR) 5 MG tablet Reorder (No AVS / No eCancel)         Encounter Diagnoses   Name Primary?    Hyperlipidemia LDL goal <100     CKD (chronic kidney disease) stage 2, GFR 60-89 ml/min Yes    Aortic valve insufficiency, etiology of cardiac valve disease unspecified     History of aortic dissection        CURRENT MEDICATIONS:  Current Outpatient Medications   Medication Sig Dispense Refill    amLODIPine (NORVASC) 5 MG tablet TAKE 1 TABLET(5 MG) BY MOUTH DAILY 90 tablet 2    Ascorbic Acid (VITAMIN C PO) Take 500 mg by mouth daily      calcium carbonate (OS-ALEXI 500 MG [place]. CA) 500 MG tablet Take 500 mg by mouth daily      cholecalciferol (VITAMIN D3) 25 mcg (1000 units) capsule Take 1 capsule by mouth daily.      labetalol (NORMODYNE) 200 MG tablet Take 1 tablet (200 mg) by mouth 3 times daily - overdue for fasting annual exam 270 tablet 2    losartan (COZAAR) 50 MG tablet TAKE 1 TABLET(50 MG) BY MOUTH DAILY 90 tablet 2    rosuvastatin (CRESTOR) 5 MG tablet Take 1 tablet (5 mg) by mouth daily 90 tablet 3    traZODone (DESYREL) 50 MG tablet Take 1 tablet (50 mg) by mouth every evening as needed for sleep 90 tablet 1       ALLERGIES     Allergies   Allergen Reactions    Lisinopril      Cough      Simvastatin      myalgias       PAST MEDICAL HISTORY:  Past Medical History:   Diagnosis Date    Anemia     Aortic valve insufficiency, etiology of cardiac valve disease unspecified 04/13/2016    Best vitelliform macular dystrophy     CKD (chronic kidney disease) stage 3, GFR 30-59 ml/min (H) 12/09/2015    Former smoker     HTN (hypertension),  benign     Hx of repair of dissecting thoracic aortic aneurysm, [place] type A     +fibromuscular dysplasia    Hyperlipidemia LDL goal < 130     Insomnia     ASH (obstructive sleep apnea)(mild AHI=14) 2015    Osteoporosis, postmenopausal        PAST SURGICAL HISTORY:  Past Surgical History:   Procedure Laterality Date    CARDIAC SURGERY      HC OPEN TX METATARSAL FRACTURE      S/p thoracic aortic aneurysm repair  10/21/12    VASCULAR SURGERY         FAMILY HISTORY:  Family History   Problem Relation Age of Onset    Hypertension Father         best syndrome    Cancer Father         lung    Hypertension Paternal Grandmother         stroke    Alcohol/Drug Brother         best syndr       SOCIAL HISTORY:  Social History     Socioeconomic History    Marital status: Single     Spouse name: None    Number of children: None    Years of education: None    Highest education level: None   Tobacco Use    Smoking status: Former     Types: Cigarettes     Quit date: 1974     Years since quittin.4    Smokeless tobacco: Never    Tobacco comments:     light smoker    Vaping Use    Vaping status: Never Used   Substance and Sexual Activity    Alcohol use: Yes     Alcohol/week: 3.0 standard drinks of alcohol     Types: 3 Glasses of wine per week     Comment: I-2 drinks with dinner    Drug use: No    Sexual activity: Yes     Partners: Male   Other Topics Concern    Parent/sibling w/ CABG, MI or angioplasty before 65F 55M? No    Caffeine Concern Yes     Comment: 2 cups  a day    Special Diet Yes    Exercise Yes     Comment: [facility]   Social History Narrative    Working as a  PT.     Retired 2014.       Review of Systems:  Skin:  not assessed       Eyes:  not assessed      ENT:  not assessed      Respiratory:  Positive for sleep apnea mild   Cardiovascular:  Negative;palpitations;chest pain;fatigue;lightheadedness Positive for;dizziness had for about 10 days  Gastroenterology: not assessed     "  Genitourinary:  not assessed      Musculoskeletal:  not assessed      Neurologic:  not assessed      Psychiatric:  not assessed      Heme/Lymph/Imm:  not assessed      Endocrine:  Negative thyroid disorder;diabetes      Physical Exam:  Vitals: /57   Pulse 63   Ht 1.549 m (5' 1\")   Wt 52.5 kg (115 lb 11.2 oz)   SpO2 96%   BMI 21.86 kg/m      Constitutional:  in no acute distress        Skin:  warm and dry to the touch, no apparent skin lesions or masses noted          Head:  normocephalic;no masses or lesions        Eyes:  conjunctivae and lids unremarkable        Lymph:      ENT:  dentition good;no pallor or cyanosis        Neck:  JVP normal;no carotid bruit;carotid pulses are full and equal bilaterally        Respiratory:  clear to auscultation         Cardiac: regular rhythm           mid diastolic murmur;grade 1 Normal S1, S2, 1/6 ELISEO, faint holodiastolic murmur at the LUSB, no rubs or gallops  pulses full and equal                                   2+ bilateral femoral, dp, radial and carotid    GI:  abdomen soft;no bruits   soft, non-tender, + bruit    Extremities and Muscular Skeletal:  no edema;no deformities, clubbing, cyanosis, erythema observed              Neurological:  no gross motor deficits        Psych:  Alert and Oriented x 3        CC  No referring provider defined for this encounter.      Thank you for allowing me to participate in the care of your patient.      Sincerely,     Kenn Jean-Baptiste MD, MD     [facility]  "

## 2023-06-20 ENCOUNTER — TELEPHONE (OUTPATIENT)
Dept: CARDIOLOGY | Facility: CLINIC | Age: 83
End: 2023-06-20

## 2023-06-20 ENCOUNTER — HOSPITAL ENCOUNTER (OUTPATIENT)
Dept: CARDIOLOGY | Facility: CLINIC | Age: 83
Discharge: HOME | End: 2023-06-20
Attending: INTERNAL MEDICINE | Admitting: INTERNAL MEDICINE
Payer: COMMERCIAL

## 2023-06-20 DIAGNOSIS — I35.1 AORTIC VALVE INSUFFICIENCY, ETIOLOGY OF CARDIAC VALVE DISEASE UNSPECIFIED: ICD-10-CM

## 2023-06-20 DIAGNOSIS — I35.1 AORTIC VALVE INSUFFICIENCY, ETIOLOGY OF CARDIAC VALVE DISEASE UNSPECIFIED: Primary | ICD-10-CM

## 2023-06-20 LAB — LVEF ECHO: NORMAL

## 2023-06-20 PROCEDURE — 93306 TTE W/DOPPLER COMPLETE: CPT

## 2023-06-20 PROCEDURE — 93306 TTE W/DOPPLER COMPLETE: CPT | Mod: 26 | Performed by: INTERNAL MEDICINE

## 2023-06-20 NOTE — TELEPHONE ENCOUNTER
Routing echo results to Dr Jean-Baptiste for review and recommendations.      6/5/23 echo results:  Interpretation Summary   A sigmoid septum is present.  The visual ejection fraction is 60-65%.  Grade I or early diastolic dysfunction.  No regional wall motion abnormalities noted.  There is mild to moderate (1-2+) mitral regurgitation.  There is mild to moderate (1-2+) tricuspid regurgitation.  Right ventricle systolic pressure estimate normal  There is moderate (2+) aortic regurgitation.    Per 6/5/23 OV note from Dr Jean-Baptiste:  Her aortic regurgitation murmur is louder during examination today.  As such, will obtain repeat echocardiogram to reassess severely aortic regurgitation.    Per 2/3/22 echo:  Aortic Valve  There is mild trileaflet aortic sclerosis. There is moderate (2+) aortic regurgitation. No aortic stenosis is present.      ADDENDUM 6/21/23  [employer] message sent to pt to communicate Dr Jean-Baptiste's review of her echo below.  Echo order placed.    Kenn Jean-Baptiste MD    6/21/23 10:06 AM  Echo reviewed. Stable moderate AI. Repeat echo in 1-2 yrs. Thanks

## 2023-08-10 ENCOUNTER — TRANSFERRED RECORDS (OUTPATIENT)
Dept: HEALTH INFORMATION MANAGEMENT | Facility: CLINIC | Age: 83
End: 2023-08-10
Payer: COMMERCIAL

## 2023-08-21 DIAGNOSIS — F51.01 PRIMARY INSOMNIA: ICD-10-CM

## 2023-08-22 RX ORDER — TRAZODONE HYDROCHLORIDE 50 MG/1
50 TABLET, FILM COATED ORAL
Qty: 90 TABLET | Refills: 0 | Status: SHIPPED | OUTPATIENT
Start: 2023-08-22 | End: 2023-11-14

## 2023-08-29 ASSESSMENT — SLEEP AND FATIGUE QUESTIONNAIRES
HOW LIKELY ARE YOU TO NOD OFF OR FALL ASLEEP IN A CAR, WHILE STOPPED FOR A FEW MINUTES IN TRAFFIC: WOULD NEVER DOZE
HOW LIKELY ARE YOU TO NOD OFF OR FALL ASLEEP WHEN YOU ARE A PASSENGER IN A CAR FOR AN HOUR WITHOUT A BREAK: MODERATE CHANCE OF DOZING
HOW LIKELY ARE YOU TO NOD OFF OR FALL ASLEEP WHILE SITTING AND READING: SLIGHT CHANCE OF DOZING
HOW LIKELY ARE YOU TO NOD OFF OR FALL ASLEEP WHILE LYING DOWN TO REST IN THE AFTERNOON WHEN CIRCUMSTANCES PERMIT: MODERATE CHANCE OF DOZING
HOW LIKELY ARE YOU TO NOD OFF OR FALL ASLEEP WHILE WATCHING TV: SLIGHT CHANCE OF DOZING
HOW LIKELY ARE YOU TO NOD OFF OR FALL ASLEEP WHILE SITTING AND TALKING TO SOMEONE: WOULD NEVER DOZE
HOW LIKELY ARE YOU TO NOD OFF OR FALL ASLEEP WHILE SITTING QUIETLY AFTER LUNCH WITHOUT ALCOHOL: SLIGHT CHANCE OF DOZING
HOW LIKELY ARE YOU TO NOD OFF OR FALL ASLEEP WHILE SITTING INACTIVE IN A PUBLIC PLACE: SLIGHT CHANCE OF DOZING

## 2023-08-30 ENCOUNTER — OFFICE VISIT (OUTPATIENT)
Dept: SLEEP MEDICINE | Facility: CLINIC | Age: 83
End: 2023-08-30
Attending: PHYSICIAN ASSISTANT
Payer: COMMERCIAL

## 2023-08-30 DIAGNOSIS — I10 HTN (HYPERTENSION), BENIGN: ICD-10-CM

## 2023-08-30 DIAGNOSIS — G47.00 INSOMNIA, UNSPECIFIED TYPE: ICD-10-CM

## 2023-08-30 DIAGNOSIS — G47.33 OSA (OBSTRUCTIVE SLEEP APNEA): ICD-10-CM

## 2023-08-31 ENCOUNTER — OFFICE VISIT (OUTPATIENT)
Dept: SLEEP MEDICINE | Facility: CLINIC | Age: 83
End: 2023-08-31
Payer: COMMERCIAL

## 2023-08-31 DIAGNOSIS — G47.33 OSA (OBSTRUCTIVE SLEEP APNEA): Primary | ICD-10-CM

## 2023-08-31 PROCEDURE — G0399 HOME SLEEP TEST/TYPE 3 PORTA: HCPCS | Performed by: INTERNAL MEDICINE

## 2023-08-31 ASSESSMENT — SLEEP AND FATIGUE QUESTIONNAIRES
HOW LIKELY ARE YOU TO NOD OFF OR FALL ASLEEP WHILE SITTING AND TALKING TO SOMEONE: WOULD NEVER DOZE
HOW LIKELY ARE YOU TO NOD OFF OR FALL ASLEEP WHILE SITTING QUIETLY AFTER LUNCH WITHOUT ALCOHOL: SLIGHT CHANCE OF DOZING
HOW LIKELY ARE YOU TO NOD OFF OR FALL ASLEEP WHILE SITTING AND READING: SLIGHT CHANCE OF DOZING
HOW LIKELY ARE YOU TO NOD OFF OR FALL ASLEEP WHILE LYING DOWN TO REST IN THE AFTERNOON WHEN CIRCUMSTANCES PERMIT: MODERATE CHANCE OF DOZING
HOW LIKELY ARE YOU TO NOD OFF OR FALL ASLEEP WHILE SITTING INACTIVE IN A PUBLIC PLACE: WOULD NEVER DOZE
HOW LIKELY ARE YOU TO NOD OFF OR FALL ASLEEP IN A CAR, WHILE STOPPED FOR A FEW MINUTES IN TRAFFIC: WOULD NEVER DOZE
HOW LIKELY ARE YOU TO NOD OFF OR FALL ASLEEP WHEN YOU ARE A PASSENGER IN A CAR FOR AN HOUR WITHOUT A BREAK: SLIGHT CHANCE OF DOZING
HOW LIKELY ARE YOU TO NOD OFF OR FALL ASLEEP WHILE WATCHING TV: SLIGHT CHANCE OF DOZING

## 2023-08-31 NOTE — PROGRESS NOTES
Patient was not able to figure out how to put the device on last night. Patient came in today to have the device reprogrammed and to be shown how to put the device on. After a walk through of how to apply the device, patient had no further questions.,  Audra Garcia CMA on 8/31/2023 at 12:36 PM

## 2023-09-01 ENCOUNTER — DOCUMENTATION ONLY (OUTPATIENT)
Dept: SLEEP MEDICINE | Facility: CLINIC | Age: 83
End: 2023-09-01
Payer: COMMERCIAL

## 2023-09-01 NOTE — PROGRESS NOTES
HST POST-STUDY QUESTIONNAIRE    What time did you go to bed?  1030  How long do you think it took to fall asleep?  45 min  What time did you wake up to start the day?  730  Did you get up during the night at all?  yes  If you woke up, do you remember approximately what time(s)? 200 am  Did you have any difficulty with the equipment?  Yes I did the first time. Went back in the next day and Audra went through with me again this time I wore it  Did you us any type of treatment with this study?  None  Was the head of the bed elevated? No  Did you sleep in a recliner?  No  Did you stop using CPAP at least 3 days before this test?  NA  Any other information you'd like us to know? I personally needed to know more about equipment and what to expect. I feel like a slow learner when it comes to hooking up gagets and knowing how they work. Just me- Audra was really good

## 2023-09-05 NOTE — PROCEDURES
"HOME SLEEP STUDY INTERPRETATION      Patient: Karina Lopez  MRN: [id number]  YOB: 1940  Study Date: 2023  PCP/Referring Provider: Sary Campos;   Ordering Provider: Amy Alonso PA-C       Indications for Home Study: Karina Lopez is a 82 year old female who presents with symptoms suggestive of obstructive sleep apnea.    Estimated body mass index is 21.86 kg/m  as calculated from the following:    Height as of 23: 1.549 m (5' 1\").    Weight as of 23: 52.5 kg (115 lb 11.2 oz).  Total score - [place]: 6 (2023 12:34 PM)  STOP-BAN/8        Data: A full night home sleep study was performed recording the standard physiologic parameters including body position, movement, sound, nasal pressure, thermal oral airflow, chest and abdominal movements with respiratory inductance plethysmography, and oxygen saturation by pulse oximetry. Pulse rate was estimated by oximetry recording. This study was considered adequate based on > 4 hours of quality oximetry and respiratory recording. As specified by the AASM Manual for the Scoring of Sleep and Associated events, version 2.3, Rule VIII.D 1B, 4% oxygen desaturation scoring for hypopneas is used as a standard of care on all home sleep apnea testing.        Analysis Time:  510 minutes        Respiration:   Sleep Associated Hypoxemia: sustained hypoxemia was present. Baseline oxygen saturation was 95%.  Time with saturation less than or equal to 88% was 13.9 minutes. The lowest oxygen saturation was 75%.   Snoring: Snoring was present.  Respiratory events: The home study revealed a presence of 75 obstructive apneas and 10 mixed and central apneas. There were 4 hypopneas resulting in a combined apnea/hypopnea index [AHI] of 10.6 events per hour.  AHI was 20.2 per hour supine, - per hour prone, 3 per hour on left side, and 11.4 per hour on right side.   Pattern: Excluding events noted above, respiratory rate and pattern was " Normal.      Position: Percent of time spent: supine - 11%, prone - 4%, on left - 15.7%, on right - 68%.      Heart Rate: By pulse oximetry normal rate was noted.       Assessment:   Mild obstructive sleep apnea.  Sleep associated hypoxemia was present.    Recommendations:  If treatment of mild obstructive sleep apnea is clinically indicated, therapy options can include the following.   Patient can consider oral appliance therapy through referral to dental sleep medicine for treatment of mild obstructive sleep apnea.   If there is excessive daytime sleepiness or other qualifying medical comorbidity, consider auto titrating CPAP therapy for treatment.  Suggest optimizing sleep hygiene and avoiding sleep deprivation.        Diagnosis Code(s): Obstructive Sleep Apnea G47.33, Hypoxemia G47.36    Ermias Pinedo MD, September 5, 2023   Diplomate, American Board of Psychiatry and Neurology, Sleep Medicine

## 2023-09-05 NOTE — PROGRESS NOTES
This HSAT was performed using a Noxturnal T3 device which recorded snore, sound, movement activity, body position, nasal pressure, oronasal thermal airflow, pulse, oximetry and both chest and abdominal respiratory effort. HSAT data was restricted to the time patient states they were in bed.     HSAT was scored using 1B 4% hypopnea rule.     HST AHI (Non-PAT): 10.6  Snoring was reported as mild.  Time with SpO2 below 89% was 13.9 minutes.   Overall signal quality was good     Pt will follow up with sleep provider to determine appropriate therapy.

## 2023-10-05 ENCOUNTER — OFFICE VISIT (OUTPATIENT)
Dept: SLEEP MEDICINE | Facility: CLINIC | Age: 83
End: 2023-10-05
Payer: COMMERCIAL

## 2023-10-05 VITALS
OXYGEN SATURATION: 94 % | DIASTOLIC BLOOD PRESSURE: 57 MMHG | HEART RATE: 58 BPM | BODY MASS INDEX: 22.09 KG/M2 | HEIGHT: 61 IN | WEIGHT: 117 LBS | SYSTOLIC BLOOD PRESSURE: 111 MMHG

## 2023-10-05 DIAGNOSIS — G47.33 OSA (OBSTRUCTIVE SLEEP APNEA): Primary | ICD-10-CM

## 2023-10-05 PROCEDURE — 99213 OFFICE O/P EST LOW 20 MIN: CPT | Performed by: PHYSICIAN ASSISTANT

## 2023-10-05 NOTE — PROGRESS NOTES
[facility]   Outpatient Sleep Medicine  Oct 5, 2023       Name: Karina Lopez MRN# [id number]   Age: 82 year old YOB: 1940            Assessment and Plan:   1. ASH (obstructive sleep apnea)- mild AHI 10.6    During this visit, we reviewed the summary of the study including position, event distribution, oximetry and heart rate. Mild obstructive sleep apnea was seen with AHI 10.6 events per hour. Sleep associated hypoxemia was present with 13.9 minutes <=88% and april 75%.     Of note, we had planned to have patient complete this sleep study with positional therapy device to verify efficacy. She never ended up getting a positional device since we last spoke so this is just updated diagnostic test.     Revisited all sleep apnea treatment options including re-trial CPAP, mandibular advancement device, positional restriction, lastly consideration of surgical options. Also discussed option of no treatment as this degree of ASH not been proved to place patient at risk of increased long term cardiovascular morbidity/mortality.     As planned last visit wanting to try positional therapy. Printed off some examples for her of Slumber Bump, Sleep Noodle, Zzoma pillow. If she deems these intolerable will let me know and will likely elect mandibular advancement device and I can place sleep dentistry referral at that time.     Follow-up prn/pending the above.        Chief Complaint      Chief Complaint   Patient presents with    Study Results     HST results            History of Present Illness:   Karina Lopez is a 82 year old female who presents to the clinic for results of recent sleep study completed on 8/31/23. Study was completed to evaluate for ASH.     Reviewed results of sleep study with patient as follows:  HOME SLEEP STUDY INTERPRETATION    Patient: Karina Lopez  MRN: [id number]  YOB: 1940  Study Date: 8/31/2023  PCP/Referring Provider: Sary Campos;  "  Ordering Provider: Amy Alonso PA-C     Indications for Home Study: Karina Lopez is a 82 year old female who presents with symptoms suggestive of obstructive sleep apnea.     Estimated body mass index is 21.86 kg/m  as calculated from the following:    Height as of 23: 1.549 m (5' 1\").    Weight as of 23: 52.5 kg (115 lb 11.2 oz).  Total score - [place]: 6 (2023 12:34 PM)  STOP-BAN/8     Data: A full night home sleep study was performed recording the standard physiologic parameters including body position, movement, sound, nasal pressure, thermal oral airflow, chest and abdominal movements with respiratory inductance plethysmography, and oxygen saturation by pulse oximetry. Pulse rate was estimated by oximetry recording. This study was considered adequate based on > 4 hours of quality oximetry and respiratory recording. As specified by the AASM Manual for the Scoring of Sleep and Associated events, version 2.3, Rule VIII.D 1B, 4% oxygen desaturation scoring for hypopneas is used as a standard of care on all home sleep apnea testing.     Analysis Time:  510 minutes     Respiration:   Sleep Associated Hypoxemia: sustained hypoxemia was present. Baseline oxygen saturation was 95%.  Time with saturation less than or equal to 88% was 13.9 minutes. The lowest oxygen saturation was 75%.   Snoring: Snoring was present.  Respiratory events: The home study revealed a presence of 75 obstructive apneas and 10 mixed and central apneas. There were 4 hypopneas resulting in a combined apnea/hypopnea index [AHI] of 10.6 events per hour.  AHI was 20.2 per hour supine, - per hour prone, 3 per hour on left side, and 11.4 per hour on right side.   Pattern: Excluding events noted above, respiratory rate and pattern was Normal.     Position: Percent of time spent: supine - 11%, prone - 4%, on left - 15.7%, on right - 68%.     Heart Rate: By pulse oximetry normal rate was noted.     Past medical/surgical history, " "family history, social history, medications and allergies were reviewed.           Physical Examination:   /57   Pulse 58   Ht 1.549 m (5' 0.98\")   Wt 53.1 kg (117 lb)   SpO2 94%   BMI 22.12 kg/m    General appearance: Awake, alert, cooperative. Well groomed. Sitting comfortably in chair. In no apparent distress.  HEENT: Head: Normocephalic, atraumatic. Eyes:Conjunctiva clear. Sclera normal. Nose: External appearance without deformity.   Pulmonary:  Able to speak easily in full sentences. No cough or wheeze.   Skin:  No rashes or significant lesions on visible skin.   Neurologic: Alert, oriented x3.   Psychiatric: Mood euthymic. Affect congruent with full range and intensity.      CC:  Sary Campos PA-C  Oct 5, 2023     [facility] [place]  [address] [place] [address]     [facility] [place]  [address] [address]    Chart documentation was completed, in part, with Grockit voice-recognition software. Even though reviewed, some grammatical, spelling, and word errors may remain.    25 minutes spent on day of encounter doing chart review, history and exam, documentation, and further activities as noted above    "

## 2023-10-05 NOTE — NURSING NOTE
"Chief Complaint   Patient presents with    Study Results     HST results       Initial /57   Pulse 58   Ht 1.549 m (5' 0.98\")   Wt 53.1 kg (117 lb)   SpO2 94%   BMI 22.12 kg/m   Estimated body mass index is 22.12 kg/m  as calculated from the following:    Height as of this encounter: 1.549 m (5' 0.98\").    Weight as of this encounter: 53.1 kg (117 lb).    Medication Reconciliation: complete  Abe Wong MA   "

## 2023-11-13 DIAGNOSIS — F51.01 PRIMARY INSOMNIA: ICD-10-CM

## 2023-11-13 DIAGNOSIS — I10 HTN (HYPERTENSION), BENIGN: ICD-10-CM

## 2023-11-14 RX ORDER — LOSARTAN POTASSIUM 50 MG/1
TABLET ORAL
Qty: 90 TABLET | Refills: 0 | Status: SHIPPED | OUTPATIENT
Start: 2023-11-14 | End: 2024-01-31

## 2023-11-14 RX ORDER — TRAZODONE HYDROCHLORIDE 50 MG/1
TABLET, FILM COATED ORAL
Qty: 90 TABLET | Refills: 1 | Status: SHIPPED | OUTPATIENT
Start: 2023-11-14 | End: 2024-04-01

## 2023-11-14 RX ORDER — AMLODIPINE BESYLATE 5 MG/1
TABLET ORAL
Qty: 90 TABLET | Refills: 0 | Status: SHIPPED | OUTPATIENT
Start: 2023-11-14 | End: 2024-01-31

## 2023-12-28 ENCOUNTER — MYC REFILL (OUTPATIENT)
Dept: FAMILY MEDICINE | Facility: CLINIC | Age: 83
End: 2023-12-28
Payer: COMMERCIAL

## 2023-12-28 DIAGNOSIS — I10 HTN (HYPERTENSION), BENIGN: ICD-10-CM

## 2023-12-28 RX ORDER — LABETALOL 200 MG/1
200 TABLET, FILM COATED ORAL 3 TIMES DAILY
Qty: 270 TABLET | Refills: 0 | Status: SHIPPED | OUTPATIENT
Start: 2023-12-28 | End: 2024-04-01

## 2024-01-31 ENCOUNTER — OFFICE VISIT (OUTPATIENT)
Dept: FAMILY MEDICINE | Facility: CLINIC | Age: 84
End: 2024-01-31
Payer: COMMERCIAL

## 2024-01-31 VITALS
SYSTOLIC BLOOD PRESSURE: 133 MMHG | RESPIRATION RATE: 18 BRPM | BODY MASS INDEX: 21.71 KG/M2 | WEIGHT: 115 LBS | TEMPERATURE: 96.9 F | DIASTOLIC BLOOD PRESSURE: 65 MMHG | HEIGHT: 61 IN | HEART RATE: 60 BPM | OXYGEN SATURATION: 96 %

## 2024-01-31 DIAGNOSIS — Z12.31 ENCOUNTER FOR SCREENING MAMMOGRAM FOR BREAST CANCER: ICD-10-CM

## 2024-01-31 DIAGNOSIS — N18.30 STAGE 3 CHRONIC KIDNEY DISEASE, UNSPECIFIED WHETHER STAGE 3A OR 3B CKD (H): ICD-10-CM

## 2024-01-31 DIAGNOSIS — Z00.00 ENCOUNTER FOR ANNUAL WELLNESS EXAM IN MEDICARE PATIENT: Primary | ICD-10-CM

## 2024-01-31 DIAGNOSIS — G47.00 INSOMNIA, UNSPECIFIED TYPE: ICD-10-CM

## 2024-01-31 DIAGNOSIS — I10 HTN (HYPERTENSION), BENIGN: ICD-10-CM

## 2024-01-31 DIAGNOSIS — D64.9 ANEMIA, UNSPECIFIED TYPE: ICD-10-CM

## 2024-01-31 DIAGNOSIS — I35.1 AORTIC VALVE INSUFFICIENCY, ETIOLOGY OF CARDIAC VALVE DISEASE UNSPECIFIED: ICD-10-CM

## 2024-01-31 DIAGNOSIS — Z86.79 HX OF REPAIR OF DISSECTING THORACIC AORTIC ANEURYSM, STANFORD TYPE A: ICD-10-CM

## 2024-01-31 DIAGNOSIS — Z98.890 HX OF REPAIR OF DISSECTING THORACIC AORTIC ANEURYSM, STANFORD TYPE A: ICD-10-CM

## 2024-01-31 DIAGNOSIS — E78.5 HYPERLIPIDEMIA LDL GOAL <100: ICD-10-CM

## 2024-01-31 LAB — HGB BLD-MCNC: 13.3 G/DL (ref 11.7–15.7)

## 2024-01-31 PROCEDURE — 82043 UR ALBUMIN QUANTITATIVE: CPT

## 2024-01-31 PROCEDURE — 99214 OFFICE O/P EST MOD 30 MIN: CPT | Mod: 25

## 2024-01-31 PROCEDURE — 80048 BASIC METABOLIC PNL TOTAL CA: CPT

## 2024-01-31 PROCEDURE — 82570 ASSAY OF URINE CREATININE: CPT

## 2024-01-31 PROCEDURE — 80061 LIPID PANEL: CPT

## 2024-01-31 PROCEDURE — 85018 HEMOGLOBIN: CPT

## 2024-01-31 PROCEDURE — G0439 PPPS, SUBSEQ VISIT: HCPCS

## 2024-01-31 PROCEDURE — 36415 COLL VENOUS BLD VENIPUNCTURE: CPT

## 2024-01-31 RX ORDER — AMLODIPINE BESYLATE 5 MG/1
TABLET ORAL
Qty: 90 TABLET | Refills: 3 | Status: SHIPPED | OUTPATIENT
Start: 2024-01-31

## 2024-01-31 RX ORDER — AMLODIPINE BESYLATE 5 MG/1
TABLET ORAL
Qty: 90 TABLET | Refills: 0 | Status: SHIPPED | OUTPATIENT
Start: 2024-01-31 | End: 2024-01-31

## 2024-01-31 RX ORDER — LOSARTAN POTASSIUM 50 MG/1
50 TABLET ORAL DAILY
Qty: 90 TABLET | Refills: 3 | Status: SHIPPED | OUTPATIENT
Start: 2024-01-31

## 2024-01-31 RX ORDER — ROSUVASTATIN CALCIUM 5 MG/1
5 TABLET, COATED ORAL DAILY
Qty: 90 TABLET | Refills: 3 | Status: SHIPPED | OUTPATIENT
Start: 2024-01-31

## 2024-01-31 SDOH — HEALTH STABILITY: PHYSICAL HEALTH: ON AVERAGE, HOW MANY DAYS PER WEEK DO YOU ENGAGE IN MODERATE TO STRENUOUS EXERCISE (LIKE A BRISK WALK)?: 3 DAYS

## 2024-01-31 ASSESSMENT — PATIENT HEALTH QUESTIONNAIRE - PHQ9
SUM OF ALL RESPONSES TO PHQ QUESTIONS 1-9: 1
10. IF YOU CHECKED OFF ANY PROBLEMS, HOW DIFFICULT HAVE THESE PROBLEMS MADE IT FOR YOU TO DO YOUR WORK, TAKE CARE OF THINGS AT HOME, OR GET ALONG WITH OTHER PEOPLE: NOT DIFFICULT AT ALL
SUM OF ALL RESPONSES TO PHQ QUESTIONS 1-9: 1

## 2024-01-31 ASSESSMENT — SOCIAL DETERMINANTS OF HEALTH (SDOH): HOW OFTEN DO YOU GET TOGETHER WITH FRIENDS OR RELATIVES?: TWICE A WEEK

## 2024-01-31 ASSESSMENT — PAIN SCALES - GENERAL: PAINLEVEL: NO PAIN (0)

## 2024-01-31 NOTE — PROGRESS NOTES
Preventive Care Visit  [facility] ALINE Bowen DNP, Geriatric Medicine  Jan 31, 2024    Assessment & Plan     Encounter for annual wellness exam in Medicare patient  - See plan below    Stage 3 chronic kidney disease, unspecified whether stage 3a or 3b CKD (H)  - Follows with nephrology. Will get yearly labs. BP within adequate range. Continue to avoid nephrotoxic agents  - Albumin Random Urine Quantitative with Creat Ratio  - BASIC METABOLIC PANEL  - Hemoglobin    HTN (hypertension), benign  - Stable on current regimen, no side effects   - amLODIPine (NORVASC) 5 MG tablet  Dispense: 90 tablet; Refill: 0  - losartan (COZAAR) 50 MG tablet  Dispense: 90 tablet; Refill: 3    Hyperlipidemia LDL goal <100  - Tolerating statin therapy, will recheck labs  - Lipid panel reflex to direct LDL Non-fasting  - rosuvastatin (CRESTOR) 5 MG tablet  Dispense: 90 tablet; Refill: 3    Insomnia, unspecified type  - Stable, takes trazodone on occasion if needed    Anemia, unspecified type  - No acute symptoms  - Hemoglobin    Hx of repair of dissecting thoracic aortic aneurysm, [place]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 BP within adequate control    Aortic valve insufficiency, etiology of cardiac valve disease unspecified  - No cardiac symptoms, follows with cardiology    Encounter for screening mammogram for breast cancer  - Mammogram done last year, wishes to continue with screening  - MA Screening Digital Bilateral        35 minutes spent by me on the date of the encounter doing chart review, history and exam, documentation and further activities per the note      Counseling  Appropriate preventive services were discussed with this patient, including applicable screening as appropriate for fall prevention, nutrition, physical activity, Tobacco-use cessation, weight loss and cognition.  Checklist reviewing preventive services available has been given to the patient.  Reviewed patient's diet,  addressing concerns and/or questions.   She is at risk for lack of exercise and has been provided with information to increase physical activity for the benefit of her well-being.   Information on urinary incontinence and treatment options given to patient.     Patient has been advised of split billing requirements and indicates understanding: Yes    FUTURE APPOINTMENTS:       - Follow-up office or E-visit in 1-2 months to discuss bone health & treatment options       - Follow-up for annual visit or as needed  See Patient Instructions    Channing Collins is a 83 year old, presenting for the following:  Physical          Health Care Directive  Patient does not have a Health Care Directive or Living Will:   HPI  Here for AWV and med refill. No acute concerns. Has been having ongoing phlegm production a couple times per day, pale yellow in color. No infectious s/sx. Wondering if this is due to lack of humidity so she will try to see if increasing humidity in her apartment will improve symptoms. Has a close friend who was recently diagnosed with breast cancer and is still interested in screenings. Takes calcium and vitamin d supplement, would consider further osteoporosis treatment. No CP, SOB, dizziness/lightheadedness, edema, or abdominal complaints.              1/31/2024   General Health   How would you rate your overall physical health? Good   Feel stress (tense, anxious, or unable to sleep) Not at all         1/31/2024   Nutrition   Diet: Low salt         1/31/2024   Exercise   Days per week of moderate/strenous exercise 3 days         1/31/2024   Social Factors   Frequency of gathering with friends or relatives Twice a week   Worry food won't last until get money to buy more No   Food not last or not have enough money for food? No   Do you have housing?  No   Are you worried about losing your housing? No   Lack of transportation? No   Unable to get utilities (heat,electricity)? No   Want help with housing or  utility concern? No   (!) HOUSING CONCERN PRESENT      2024   Fall Risk   Fallen 2 or more times in the past year? No    No   Trouble with walking or balance? No    No          2024   Activities of Daily Living- Home Safety   Needs help with the following daily activites None of the above   Safety concerns in the home None of the above         2024   Dental   Dentist two times every year? Yes         2024   Hearing Screening   Hearing concerns? None of the above         2024   Driving Risk Screening   Patient/family members have concerns about driving No         2024   General Alertness/Fatigue Screening   Have you been more tired than usual lately? No         2024   Urinary Incontinence Screening   Bothered by leaking urine in past 6 months Yes          No data to display                Today's PHQ-9 Score:       2024     9:59 AM   PHQ-9 SCORE   PHQ-9 Total Score MyChart 1 (Minimal depression)   PHQ-9 Total Score 1         2024   Substance Use   Alcohol more than 3/day or more than 7/wk No   Do you have a current opioid prescription? No   How severe/bad is pain from 1 to 10? 0/10 (No Pain)   Do you use any other substances recreationally? No     Social History     Tobacco Use    Smoking status: Former     Types: Cigarettes     Quit date: 1974     Years since quittin.1    Smokeless tobacco: Never    Tobacco comments:     light smoker    Vaping Use    Vaping Use: Never used   Substance Use Topics    Alcohol use: Yes     Alcohol/week: 3.0 standard drinks of alcohol     Types: 3 Glasses of wine per week     Comment: I-2 drinks with dinner    Drug use: No            No data to display                 After age 74- determine frequency with patient based on health status, life expectancy and patient goals  History of abnormal Pap smear: NO - age 65 - see link Cervical Cytology Screening Guidelines                 Reviewed and updated as needed this visit by Provider    Tobacco   Meds  Problems  Med Hx  Surg Hx  Fam Hx            Past Medical History:   Diagnosis Date    Anemia     Aortic valve insufficiency, etiology of cardiac valve disease unspecified 04/13/2016    Best vitelliform macular dystrophy     CKD (chronic kidney disease) stage 3, GFR 30-59 ml/min (H) 12/09/2015    Former smoker     HTN (hypertension), benign     Hx of repair of dissecting thoracic aortic aneurysm, [place]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     +fibromuscular dysplasia    Hyperlipidemia LDL goal < 130     Insomnia     ASH (obstructive sleep apnea)(mild AHI=14) 04/12/2015    Osteoporosis, postmenopausal      Past Surgical History:   Procedure Laterality Date    CARDIAC SURGERY      HC OPEN TX METATARSAL FRACTURE      S/p thoracic aortic aneurysm repair  10/21/12    VASCULAR SURGERY       Lab work is in process  Current providers sharing in care for this patient include:  Patient Care Team:  Smitha Bowen DNP as PCP - General (Geriatric Medicine)  Ricky Puri MD as MD (Cardiology)  Kenn Jean-Baptiste MD as Assigned Heart and Vascular Provider  Sary Campos MD as Assigned PCP  Amy Alonso PA-C as Assigned Sleep Provider    The following health maintenance items are reviewed in Epic and correct as of today:  Health Maintenance   Topic Date Due    RSV VACCINE (Pregnancy & 60+) (1 - 1-dose 60+ series) Never done    ZOSTER IMMUNIZATION (2 of 3) 11/27/2014    MICROALBUMIN  05/24/2019    DTAP/TDAP/TD IMMUNIZATION (1 - Tdap) 07/31/2019    BMP  01/30/2024    LIPID  01/30/2024    HEMOGLOBIN  01/13/2024    PHQ-9  07/31/2024    MEDICARE ANNUAL WELLNESS VISIT  01/31/2025    ANNUAL REVIEW OF HM ORDERS  01/31/2025    FALL RISK ASSESSMENT  01/31/2025    ADVANCE CARE PLANNING  01/30/2028    DEXA  04/19/2032    DEPRESSION ACTION PLAN  Completed    INFLUENZA VACCINE  Completed    Pneumococcal Vaccine: 65+ Years  Completed    URINALYSIS  Completed    COVID-19 Vaccine  Completed    IPV IMMUNIZATION  Aged Out    HPV  "IMMUNIZATION  Aged Out    MENINGITIS IMMUNIZATION  Aged Out    RSV MONOCLONAL ANTIBODY  Aged Out    MAMMO SCREENING  Discontinued    LUNG CANCER SCREENING  Discontinued     Review of Systems    Review of Systems  Constitutional, HEENT, cardiovascular, pulmonary, gi and gu systems are negative, except as otherwise noted.     Objective    Exam  /65 (BP Location: Right arm, Patient Position: Sitting, Cuff Size: Adult Regular)   Pulse 60   Temp 96.9  F (36.1  C) (Temporal)   Resp 18   Ht 1.549 m (5' 0.98\")   Wt 52.2 kg (115 lb)   SpO2 96%   BMI 21.74 kg/m     Estimated body mass index is 21.74 kg/m  as calculated from the following:    Height as of this encounter: 1.549 m (5' 0.98\").    Weight as of this encounter: 52.2 kg (115 lb).  Physical Exam  GENERAL: alert and no distress  EYES: Eyes grossly normal to inspection, PERRL and conjunctivae and sclerae normal  HENT: ear canals and TM's normal, mouth without ulcers or lesions  NECK: no adenopathy, no asymmetry, masses, or scars  RESP: lungs clear to auscultation - no rales, rhonchi or wheezes  BREAST: normal without masses, tenderness or nipple discharge and no palpable axillary masses or adenopathy. Scattered dense breast tissue bilaterally  CV: regular rate and rhythm, normal S1 S2, no S3 or S4, no click or rub, no peripheral edema. Murmur present  ABDOMEN: soft, nontender, no hepatosplenomegaly, no masses and bowel sounds normal  MS: no gross musculoskeletal defects noted, no edema  SKIN: no suspicious lesions or rashes  NEURO: Normal strength and tone, mentation intact and speech normal  PSYCH: mentation appears normal, affect normal/bright  LYMPH: no cervical, supraclavicular, or axillary adenopathy         1/31/2024   Mini Cog   Clock Draw Score 2 Normal   3 Item Recall 3 objects recalled   Mini Cog Total Score 5            Signed Electronically by: Smitha Bowen, DNP, APRN, CNP      Answers submitted by the patient for this visit:  Patient Health " Questionnaire (Submitted on 1/31/2024)  If you checked off any problems, how difficult have these problems made it for you to do your work, take care of things at home, or get along with other people?: Not difficult at all  PHQ9 TOTAL SCORE: 1

## 2024-01-31 NOTE — PATIENT INSTRUCTIONS
Flonase nasal spray  Zyrtec, Claritin, Allegra, or other (allergy medicine) to help with symptoms  Tetanus (TDAP), RSV, and Shingles at pharmacy

## 2024-02-01 LAB
ANION GAP SERPL CALCULATED.3IONS-SCNC: 9 MMOL/L (ref 7–15)
BUN SERPL-MCNC: 19 MG/DL (ref 8–23)
CALCIUM SERPL-MCNC: 9.7 MG/DL (ref 8.8–10.2)
CHLORIDE SERPL-SCNC: 100 MMOL/L (ref 98–107)
CHOLEST SERPL-MCNC: 188 MG/DL
CREAT SERPL-MCNC: 0.96 MG/DL (ref 0.51–0.95)
CREAT UR-MCNC: 61.9 MG/DL
DEPRECATED HCO3 PLAS-SCNC: 27 MMOL/L (ref 22–29)
EGFRCR SERPLBLD CKD-EPI 2021: 59 ML/MIN/1.73M2
FASTING STATUS PATIENT QL REPORTED: YES
GLUCOSE SERPL-MCNC: 102 MG/DL (ref 70–99)
HDLC SERPL-MCNC: 95 MG/DL
LDLC SERPL CALC-MCNC: 80 MG/DL
MICROALBUMIN UR-MCNC: <12 MG/L
MICROALBUMIN/CREAT UR: NORMAL MG/G{CREAT}
NONHDLC SERPL-MCNC: 93 MG/DL
POTASSIUM SERPL-SCNC: 5 MMOL/L (ref 3.4–5.3)
SODIUM SERPL-SCNC: 136 MMOL/L (ref 135–145)
TRIGL SERPL-MCNC: 64 MG/DL

## 2024-02-13 ENCOUNTER — VIRTUAL VISIT (OUTPATIENT)
Dept: FAMILY MEDICINE | Facility: CLINIC | Age: 84
End: 2024-02-13
Payer: COMMERCIAL

## 2024-02-13 DIAGNOSIS — M81.0 OSTEOPOROSIS, POSTMENOPAUSAL: Primary | ICD-10-CM

## 2024-02-13 PROCEDURE — 99442 PR PHYSICIAN TELEPHONE EVALUATION 11-20 MIN: CPT | Mod: 93

## 2024-02-13 NOTE — PROGRESS NOTES
Karina is a 83 year old who is being evaluated via a billable telephone visit.      What phone number would you like to be contacted at? [phone]  How would you like to obtain your AVS? [place]    Distant Location (provider location):  On-site    Assessment & Plan     Osteoporosis, postmenopausal  - Has a history of osteopenia and osteoporosis, does not recall being treated but there is record in her chart where she was prescribed raloxifene for a short period of time. Appears it was stopped due to cost. Takes calcium/vitamin d supplement and goes to the  to help maintain strength and bone health. She undergoes another DEXA scan tomorrow and will send in her results. She is open to treatment. She will schedule follow-up appointment once results are back to discuss ongoing management, as her fracture risk in the past was high.                FUTURE APPOINTMENTS:       - Make follow-up visit after next DEXA scan    Subjective   Karina is a 83 year old, presenting for the following health issues:  Arthritis (Discuss osteoporosis)    Virtual visit today to discuss bone health and osteoporosis treatment. Had a DEXA scan a couple years ago at an outside facility and has a repeat scan tomorrow. She notes the scan from 2022 showed mild to moderate osteopenia. The scan from 2017 also showed osteopenia and osteoporosis.                 Review of Systems  Constitutional, HEENT, cardiovascular, pulmonary, gi and gu systems are negative, except as otherwise noted.      Objective           Vitals:  No vitals were obtained today due to virtual visit.    Physical Exam   General: Alert and no distress //Respiratory: No audible wheeze, cough, or shortness of breath // Psychiatric:  Appropriate affect, tone, and pace of words            Phone call duration: 13 minutes  Signed Electronically by: Smitha Bowen, DNP, APRN, CNP

## 2024-02-16 ENCOUNTER — HOSPITAL ENCOUNTER (OUTPATIENT)
Dept: MAMMOGRAPHY | Facility: CLINIC | Age: 84
Discharge: HOME | End: 2024-02-16
Payer: COMMERCIAL

## 2024-02-16 DIAGNOSIS — Z12.31 ENCOUNTER FOR SCREENING MAMMOGRAM FOR BREAST CANCER: ICD-10-CM

## 2024-02-16 PROCEDURE — 77067 SCR MAMMO BI INCL CAD: CPT

## 2024-04-01 ENCOUNTER — VIRTUAL VISIT (OUTPATIENT)
Dept: FAMILY MEDICINE | Facility: CLINIC | Age: 84
End: 2024-04-01
Payer: COMMERCIAL

## 2024-04-01 DIAGNOSIS — R14.3 FLATULENCE, ERUCTATION AND GAS PAIN: ICD-10-CM

## 2024-04-01 DIAGNOSIS — I10 HTN (HYPERTENSION), BENIGN: ICD-10-CM

## 2024-04-01 DIAGNOSIS — F51.01 PRIMARY INSOMNIA: ICD-10-CM

## 2024-04-01 DIAGNOSIS — R14.1 FLATULENCE, ERUCTATION AND GAS PAIN: ICD-10-CM

## 2024-04-01 DIAGNOSIS — G47.00 INSOMNIA, UNSPECIFIED TYPE: Primary | ICD-10-CM

## 2024-04-01 DIAGNOSIS — R14.2 FLATULENCE, ERUCTATION AND GAS PAIN: ICD-10-CM

## 2024-04-01 PROCEDURE — 99443 PR PHYSICIAN TELEPHONE EVALUATION 21-30 MIN: CPT | Mod: 93

## 2024-04-01 RX ORDER — RESPIRATORY SYNCYTIAL VIRUS VACCINE 120MCG/0.5
0.5 KIT INTRAMUSCULAR ONCE
Qty: 1 EACH | Refills: 0 | Status: CANCELLED | OUTPATIENT
Start: 2024-04-01 | End: 2024-04-01

## 2024-04-01 RX ORDER — ALPRAZOLAM 0.25 MG
0.25 TABLET ORAL 3 TIMES DAILY PRN
Qty: 20 TABLET | Refills: 0 | Status: SHIPPED | OUTPATIENT
Start: 2024-04-01 | End: 2024-04-01

## 2024-04-01 RX ORDER — ALPRAZOLAM 0.25 MG
0.25 TABLET ORAL 3 TIMES DAILY PRN
Qty: 30 TABLET | Refills: 0 | Status: SHIPPED | OUTPATIENT
Start: 2024-04-01 | End: 2024-04-01

## 2024-04-01 RX ORDER — SIMETHICONE 125 MG
125 TABLET,CHEWABLE ORAL 2 TIMES DAILY
Qty: 90 TABLET | Refills: 1 | Status: SHIPPED | OUTPATIENT
Start: 2024-04-01 | End: 2024-04-12

## 2024-04-01 RX ORDER — TRAZODONE HYDROCHLORIDE 50 MG/1
TABLET, FILM COATED ORAL
Qty: 90 TABLET | Refills: 1 | Status: SHIPPED | OUTPATIENT
Start: 2024-04-01

## 2024-04-01 RX ORDER — ALPRAZOLAM 0.25 MG
0.25 TABLET ORAL
Qty: 20 TABLET | Refills: 0 | Status: ON HOLD | OUTPATIENT
Start: 2024-04-01 | End: 2024-04-14

## 2024-04-01 RX ORDER — LABETALOL 200 MG/1
200 TABLET, FILM COATED ORAL 3 TIMES DAILY
Qty: 270 TABLET | Refills: 1 | Status: SHIPPED | OUTPATIENT
Start: 2024-04-01 | End: 2024-07-17

## 2024-04-01 NOTE — PROGRESS NOTES
"Karina is a 83 year old who is being evaluated via a billable telephone visit.    What phone number would you like to be contacted at? [phone]   How would you like to obtain your AVS? Mail a copy  Originating Location (pt. Location): Home  Distant Location (provider location):  On-site    Assessment & Plan       Primary insomnia  Feels trazodone is somewhat effective in managing insomnia, has some upcoming travel plans and worries about sleep and jet lag. Discussed indications and use of xanax and to use sparingly and with caution as they can cause sedation.  - traZODone (DESYREL) 50 MG tablet  Dispense: 90 tablet; Refill: 1  - ALPRAZolam (XANAX) 0.25 MG tablet  Dispense: 20 tablet; Refill: 0    HTN (hypertension), benign  Continue losartan, amlodipine, and labetalol; will refill  - labetalol (NORMODYNE) 200 MG tablet  Dispense: 270 tablet; Refill: 1    Flatulence, eructation and gas pain  Symptoms per HPI, no red flag symptoms or acute symptoms. Will try OTC gas relief. Provided information on foods that contribute to increased gas discomfort. Patient interested in starting prebiotic/probiotic.  - simethicone (MYLICON) 125 MG chewable tablet  Dispense: 90 tablet; Refill: 1        30 minutes spent by me on the date of the encounter doing chart review, history and exam, documentation and further activities per the note        FUTURE APPOINTMENTS:       - Follow-up for annual visit or as needed    Subjective   Karina is a 83 year old, presenting for the following health issues:  Abdominal Pain and Sleep Problem    Reports feeling \"extremely gassy.\" Feels this has been ongoing for quite some time. Has wondered about trying a probiotic and a prebiotic.  Going to [place] April 26 for a week. Wonders about some options to help with sleep and jet lag  On 3/22, she had an episode of diarrhea after eating at a new Asian restaurant. Did not have any raw food. That has since resolved. Then had another instance where she " went to an  restaurant and had some additional gut discomfort. No abdominal pain. No blood in stool. No N/V. No fever.                 Review of Systems  Constitutional, HEENT, cardiovascular, pulmonary, gi and gu systems are negative, except as otherwise noted.      Objective           Vitals:  No vitals were obtained today due to virtual visit.    Physical Exam   General: Alert and no distress //Respiratory: No audible wheeze, cough, or shortness of breath // Psychiatric:  Appropriate affect, tone, and pace of words            Phone call duration: 29 minutes  Signed Electronically by: Smitha Bowen, DNP, APRN, CNP

## 2024-04-01 NOTE — PATIENT INSTRUCTIONS
Milk and dairy products Cheese (may/may not relate to lactose), ice cream, milk   Vegetables Asparagus, broccoli, brussel sprouts, cabbage, cauliflower, celery, corn, cucumber, kohlrabi, leeks, onions, parsnips, potatoes, radishes, rutabaga, turnips   Fruits Apples, apricots, bananas, peaches, pears, prunes, raisins   Whole grains Bagels, bran/bran cereal, pretzels, wheat and oats, wheat germ   Legumes Baked beans, beans, lima beans, peas, soybeans   Fatty foods Fried foods, pork*   Liquids Beer, carbonated beverages, carbonated medications   Miscellaneous Artificial sweeteners, chewing gum     Foods associated with increased gas

## 2024-04-12 ENCOUNTER — NURSE TRIAGE (OUTPATIENT)
Dept: NURSING | Facility: CLINIC | Age: 84
End: 2024-04-12
Payer: COMMERCIAL

## 2024-04-12 ENCOUNTER — APPOINTMENT (OUTPATIENT)
Dept: CT IMAGING | Facility: CLINIC | Age: 84
End: 2024-04-12
Attending: EMERGENCY MEDICINE
Payer: COMMERCIAL

## 2024-04-12 ENCOUNTER — HOSPITAL ENCOUNTER (OUTPATIENT)
Facility: CLINIC | Age: 84
Setting detail: OBSERVATION
Discharge: HOME | End: 2024-04-14
Attending: EMERGENCY MEDICINE | Admitting: INTERNAL MEDICINE
Payer: COMMERCIAL

## 2024-04-12 ENCOUNTER — APPOINTMENT (OUTPATIENT)
Dept: CT IMAGING | Facility: CLINIC | Age: 84
End: 2024-04-12
Attending: INTERNAL MEDICINE
Payer: COMMERCIAL

## 2024-04-12 DIAGNOSIS — S22.42XS CLOSED FRACTURE OF MULTIPLE RIBS OF LEFT SIDE, SEQUELA: Primary | ICD-10-CM

## 2024-04-12 DIAGNOSIS — T88.7XXA MEDICATION SIDE EFFECTS: ICD-10-CM

## 2024-04-12 DIAGNOSIS — S27.0XXA TRAUMATIC PNEUMOTHORAX, INITIAL ENCOUNTER: ICD-10-CM

## 2024-04-12 DIAGNOSIS — S22.42XA CLOSED FRACTURE OF MULTIPLE RIBS OF LEFT SIDE, INITIAL ENCOUNTER: ICD-10-CM

## 2024-04-12 LAB
ALBUMIN SERPL BCG-MCNC: 4.3 G/DL (ref 3.5–5.2)
ALP SERPL-CCNC: 67 U/L (ref 40–150)
ALT SERPL W P-5'-P-CCNC: 19 U/L (ref 0–50)
ANION GAP SERPL CALCULATED.3IONS-SCNC: 12 MMOL/L (ref 7–15)
AST SERPL W P-5'-P-CCNC: 18 U/L (ref 0–45)
ATRIAL RATE - MUSE: 61 BPM
BASOPHILS # BLD AUTO: 0 10E3/UL (ref 0–0.2)
BASOPHILS NFR BLD AUTO: 0 %
BILIRUB SERPL-MCNC: 1.6 MG/DL
BUN SERPL-MCNC: 20.2 MG/DL (ref 8–23)
CALCIUM SERPL-MCNC: 9 MG/DL (ref 8.8–10.2)
CHLORIDE SERPL-SCNC: 93 MMOL/L (ref 98–107)
CREAT SERPL-MCNC: 0.95 MG/DL (ref 0.51–0.95)
DEPRECATED HCO3 PLAS-SCNC: 26 MMOL/L (ref 22–29)
DIASTOLIC BLOOD PRESSURE - MUSE: NORMAL MMHG
EGFRCR SERPLBLD CKD-EPI 2021: 60 ML/MIN/1.73M2
EOSINOPHIL # BLD AUTO: 0.3 10E3/UL (ref 0–0.7)
EOSINOPHIL NFR BLD AUTO: 5 %
ERYTHROCYTE [DISTWIDTH] IN BLOOD BY AUTOMATED COUNT: 13 % (ref 10–15)
GLUCOSE SERPL-MCNC: 109 MG/DL (ref 70–99)
HCT VFR BLD AUTO: 37.4 % (ref 35–47)
HGB BLD-MCNC: 12.7 G/DL (ref 11.7–15.7)
HOLD SPECIMEN: NORMAL
IMM GRANULOCYTES # BLD: 0 10E3/UL
IMM GRANULOCYTES NFR BLD: 0 %
INTERPRETATION ECG - MUSE: NORMAL
LYMPHOCYTES # BLD AUTO: 0.9 10E3/UL (ref 0.8–5.3)
LYMPHOCYTES NFR BLD AUTO: 12 %
MCH RBC QN AUTO: 31.1 PG (ref 26.5–33)
MCHC RBC AUTO-ENTMCNC: 34 G/DL (ref 31.5–36.5)
MCV RBC AUTO: 91 FL (ref 78–100)
MONOCYTES # BLD AUTO: 0.7 10E3/UL (ref 0–1.3)
MONOCYTES NFR BLD AUTO: 9 %
NEUTROPHILS # BLD AUTO: 5.4 10E3/UL (ref 1.6–8.3)
NEUTROPHILS NFR BLD AUTO: 74 %
NRBC # BLD AUTO: 0 10E3/UL
NRBC BLD AUTO-RTO: 0 /100
P AXIS - MUSE: 24 DEGREES
PLATELET # BLD AUTO: 196 10E3/UL (ref 150–450)
POTASSIUM SERPL-SCNC: 4.4 MMOL/L (ref 3.4–5.3)
PR INTERVAL - MUSE: 140 MS
PROT SERPL-MCNC: 6.8 G/DL (ref 6.4–8.3)
QRS DURATION - MUSE: 84 MS
QT - MUSE: 462 MS
QTC - MUSE: 465 MS
R AXIS - MUSE: -9 DEGREES
RBC # BLD AUTO: 4.09 10E6/UL (ref 3.8–5.2)
SODIUM SERPL-SCNC: 131 MMOL/L (ref 135–145)
SYSTOLIC BLOOD PRESSURE - MUSE: NORMAL MMHG
T AXIS - MUSE: 62 DEGREES
TROPONIN T SERPL HS-MCNC: 14 NG/L
TROPONIN T SERPL HS-MCNC: 17 NG/L
VENTRICULAR RATE- MUSE: 61 BPM
WBC # BLD AUTO: 7.3 10E3/UL (ref 4–11)

## 2024-04-12 PROCEDURE — 85041 AUTOMATED RBC COUNT: CPT | Performed by: EMERGENCY MEDICINE

## 2024-04-12 PROCEDURE — 250N000013 HC RX MED GY IP 250 OP 250 PS 637: Performed by: INTERNAL MEDICINE

## 2024-04-12 PROCEDURE — 84484 ASSAY OF TROPONIN QUANT: CPT | Performed by: EMERGENCY MEDICINE

## 2024-04-12 PROCEDURE — 99223 1ST HOSP IP/OBS HIGH 75: CPT | Performed by: INTERNAL MEDICINE

## 2024-04-12 PROCEDURE — G0378 HOSPITAL OBSERVATION PER HR: HCPCS

## 2024-04-12 PROCEDURE — 96374 THER/PROPH/DIAG INJ IV PUSH: CPT

## 2024-04-12 PROCEDURE — 258N000003 HC RX IP 258 OP 636: Performed by: EMERGENCY MEDICINE

## 2024-04-12 PROCEDURE — 96376 TX/PRO/DX INJ SAME DRUG ADON: CPT | Mod: 59

## 2024-04-12 PROCEDURE — 70450 CT HEAD/BRAIN W/O DYE: CPT

## 2024-04-12 PROCEDURE — 258N000003 HC RX IP 258 OP 636: Performed by: INTERNAL MEDICINE

## 2024-04-12 PROCEDURE — 99285 EMERGENCY DEPT VISIT HI MDM: CPT | Mod: 25

## 2024-04-12 PROCEDURE — 250N000011 HC RX IP 250 OP 636: Performed by: EMERGENCY MEDICINE

## 2024-04-12 PROCEDURE — 36415 COLL VENOUS BLD VENIPUNCTURE: CPT | Performed by: EMERGENCY MEDICINE

## 2024-04-12 PROCEDURE — 250N000009 HC RX 250: Performed by: EMERGENCY MEDICINE

## 2024-04-12 PROCEDURE — 96361 HYDRATE IV INFUSION ADD-ON: CPT

## 2024-04-12 PROCEDURE — 93005 ELECTROCARDIOGRAM TRACING: CPT

## 2024-04-12 PROCEDURE — 80053 COMPREHEN METABOLIC PANEL: CPT | Performed by: EMERGENCY MEDICINE

## 2024-04-12 PROCEDURE — 71260 CT THORAX DX C+: CPT

## 2024-04-12 RX ORDER — IOPAMIDOL 755 MG/ML
72 INJECTION, SOLUTION INTRAVASCULAR ONCE
Status: COMPLETED | OUTPATIENT
Start: 2024-04-12 | End: 2024-04-12

## 2024-04-12 RX ORDER — NALOXONE HYDROCHLORIDE 0.4 MG/ML
0.4 INJECTION, SOLUTION INTRAMUSCULAR; INTRAVENOUS; SUBCUTANEOUS
Status: DISCONTINUED | OUTPATIENT
Start: 2024-04-12 | End: 2024-04-14 | Stop reason: HOSPADM

## 2024-04-12 RX ORDER — NALOXONE HYDROCHLORIDE 0.4 MG/ML
0.2 INJECTION, SOLUTION INTRAMUSCULAR; INTRAVENOUS; SUBCUTANEOUS
Status: DISCONTINUED | OUTPATIENT
Start: 2024-04-12 | End: 2024-04-14 | Stop reason: HOSPADM

## 2024-04-12 RX ORDER — AMOXICILLIN 250 MG
2 CAPSULE ORAL 2 TIMES DAILY PRN
Status: DISCONTINUED | OUTPATIENT
Start: 2024-04-12 | End: 2024-04-14 | Stop reason: HOSPADM

## 2024-04-12 RX ORDER — HYDROMORPHONE HYDROCHLORIDE 1 MG/ML
0.3 INJECTION, SOLUTION INTRAMUSCULAR; INTRAVENOUS; SUBCUTANEOUS ONCE
Status: COMPLETED | OUTPATIENT
Start: 2024-04-12 | End: 2024-04-12

## 2024-04-12 RX ORDER — LIDOCAINE 4 G/G
2 PATCH TOPICAL
Status: DISCONTINUED | OUTPATIENT
Start: 2024-04-12 | End: 2024-04-14 | Stop reason: HOSPADM

## 2024-04-12 RX ORDER — ACETAMINOPHEN 500 MG
1000 TABLET ORAL EVERY 8 HOURS
Status: DISCONTINUED | OUTPATIENT
Start: 2024-04-12 | End: 2024-04-14 | Stop reason: HOSPADM

## 2024-04-12 RX ORDER — LABETALOL 200 MG/1
200 TABLET, FILM COATED ORAL 3 TIMES DAILY
Status: DISCONTINUED | OUTPATIENT
Start: 2024-04-12 | End: 2024-04-14 | Stop reason: HOSPADM

## 2024-04-12 RX ORDER — HYDROMORPHONE HYDROCHLORIDE 1 MG/ML
0.3 INJECTION, SOLUTION INTRAMUSCULAR; INTRAVENOUS; SUBCUTANEOUS
Status: DISCONTINUED | OUTPATIENT
Start: 2024-04-12 | End: 2024-04-14 | Stop reason: HOSPADM

## 2024-04-12 RX ORDER — ONDANSETRON 2 MG/ML
4 INJECTION INTRAMUSCULAR; INTRAVENOUS EVERY 6 HOURS PRN
Status: DISCONTINUED | OUTPATIENT
Start: 2024-04-12 | End: 2024-04-14 | Stop reason: HOSPADM

## 2024-04-12 RX ORDER — LOSARTAN POTASSIUM 50 MG/1
50 TABLET ORAL DAILY
Status: DISCONTINUED | OUTPATIENT
Start: 2024-04-13 | End: 2024-04-14 | Stop reason: HOSPADM

## 2024-04-12 RX ORDER — HYDROMORPHONE HYDROCHLORIDE 2 MG/1
2 TABLET ORAL
Status: DISCONTINUED | OUTPATIENT
Start: 2024-04-12 | End: 2024-04-14 | Stop reason: HOSPADM

## 2024-04-12 RX ORDER — MULTIPLE VITAMINS W/ MINERALS TAB 9MG-400MCG
1 TAB ORAL DAILY
COMMUNITY

## 2024-04-12 RX ORDER — PROCHLORPERAZINE 25 MG
12.5 SUPPOSITORY, RECTAL RECTAL EVERY 12 HOURS PRN
Status: DISCONTINUED | OUTPATIENT
Start: 2024-04-12 | End: 2024-04-14 | Stop reason: HOSPADM

## 2024-04-12 RX ORDER — ONDANSETRON 4 MG/1
4 TABLET, ORALLY DISINTEGRATING ORAL EVERY 6 HOURS PRN
Status: DISCONTINUED | OUTPATIENT
Start: 2024-04-12 | End: 2024-04-14 | Stop reason: HOSPADM

## 2024-04-12 RX ORDER — SODIUM CHLORIDE 9 MG/ML
INJECTION, SOLUTION INTRAVENOUS CONTINUOUS
Status: DISCONTINUED | OUTPATIENT
Start: 2024-04-12 | End: 2024-04-13

## 2024-04-12 RX ORDER — AMLODIPINE BESYLATE 5 MG/1
5 TABLET ORAL DAILY
Status: DISCONTINUED | OUTPATIENT
Start: 2024-04-13 | End: 2024-04-14 | Stop reason: HOSPADM

## 2024-04-12 RX ORDER — PROCHLORPERAZINE MALEATE 5 MG
5 TABLET ORAL EVERY 6 HOURS PRN
Status: DISCONTINUED | OUTPATIENT
Start: 2024-04-12 | End: 2024-04-14 | Stop reason: HOSPADM

## 2024-04-12 RX ORDER — AMOXICILLIN 250 MG
1 CAPSULE ORAL 2 TIMES DAILY PRN
Status: DISCONTINUED | OUTPATIENT
Start: 2024-04-12 | End: 2024-04-14 | Stop reason: HOSPADM

## 2024-04-12 RX ADMIN — HYDROMORPHONE HYDROCHLORIDE 0.3 MG: 1 INJECTION, SOLUTION INTRAMUSCULAR; INTRAVENOUS; SUBCUTANEOUS at 10:17

## 2024-04-12 RX ADMIN — LIDOCAINE 2 PATCH: 4 PATCH TOPICAL at 11:57

## 2024-04-12 RX ADMIN — IOPAMIDOL 72 ML: 755 INJECTION, SOLUTION INTRAVENOUS at 09:22

## 2024-04-12 RX ADMIN — HYDROMORPHONE HYDROCHLORIDE 0.3 MG: 1 INJECTION, SOLUTION INTRAMUSCULAR; INTRAVENOUS; SUBCUTANEOUS at 12:29

## 2024-04-12 RX ADMIN — SODIUM CHLORIDE 80 ML: 9 INJECTION, SOLUTION INTRAVENOUS at 09:22

## 2024-04-12 RX ADMIN — SODIUM CHLORIDE 1000 ML: 9 INJECTION, SOLUTION INTRAVENOUS at 08:18

## 2024-04-12 RX ADMIN — LABETALOL HYDROCHLORIDE 200 MG: 200 TABLET, FILM COATED ORAL at 20:13

## 2024-04-12 RX ADMIN — ACETAMINOPHEN 1000 MG: 500 TABLET, FILM COATED ORAL at 11:56

## 2024-04-12 RX ADMIN — SODIUM CHLORIDE: 9 INJECTION, SOLUTION INTRAVENOUS at 20:43

## 2024-04-12 RX ADMIN — ACETAMINOPHEN 1000 MG: 500 TABLET, FILM COATED ORAL at 20:13

## 2024-04-12 ASSESSMENT — ACTIVITIES OF DAILY LIVING (ADL)
ADLS_ACUITY_SCORE: 37
ADLS_ACUITY_SCORE: 37
ADLS_ACUITY_SCORE: 36
ADLS_ACUITY_SCORE: 37
ADLS_ACUITY_SCORE: 36
ADLS_ACUITY_SCORE: 37
ADLS_ACUITY_SCORE: 36
ADLS_ACUITY_SCORE: 37
ADLS_ACUITY_SCORE: 37

## 2024-04-12 NOTE — CONSULTS
THORACIC SURGERY CONSULTATION    Karina Lopez  1940   [id number]    Date of Admission: 4/12/2024  7:34 AM  Date of Consult: 4/12/2024     Referring Provider: Felicita Mata MD  Consulting Thoracic Surgeon: Dr. 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 She was prescribed a new sleep medication for her upcoming flight to [place] by her PCP and tried the medication last night. She is unsure that she fell, but woke up on her cough with left sided chest pain. She presented to the [facility] ED and CT chest demonstrated left anterolateral 6th - 8th rib fractures with a small left pneumothorax. Thoracic surgery consulted.    This evening, she is alert and breathing feels normal at rest. She is able to take deep breaths, but notes pain with exhalation. She does not take blood thinners.       ROS: A 12-point review of systems was performed and negative except as noted in the HPI above.     Past Medical History:  Past Medical History:   Diagnosis Date    Anemia     Aortic valve insufficiency, etiology of cardiac valve disease unspecified 04/13/2016    Best vitelliform macular dystrophy     CKD (chronic kidney disease) stage 3, GFR 30-59 ml/min (H) 12/09/2015    Former smoker     HTN (hypertension), benign     Hx of repair of dissecting thoracic aortic aneurysm, [place] type A     +fibromuscular dysplasia    Hyperlipidemia LDL goal < 130     Insomnia     ASH (obstructive sleep apnea)(mild AHI=14) 04/12/2015    Osteoporosis, postmenopausal         Past Surgical History:  Past Surgical History:   Procedure Laterality Date    CARDIAC SURGERY      HC OPEN TX METATARSAL FRACTURE      S/p  "thoracic aortic aneurysm repair  10/21/12    VASCULAR SURGERY         Family History:  Family History   Problem Relation Age of Onset    Hypertension Father         best syndrome    Cancer Father         lung    Hypertension Paternal Grandmother         stroke    Alcohol/Drug Brother         best syndr       Medications:  Current Outpatient Medications   Medication Sig Dispense Refill    ALPRAZolam (XANAX) 0.25 MG tablet Take 1 tablet (0.25 mg) by mouth nightly as needed for anxiety or sleep 20 tablet 0    amLODIPine (NORVASC) 5 MG tablet TAKE 1 TABLET(5 MG) BY MOUTH DAILY 90 tablet 3    Ascorbic Acid (VITAMIN C PO) Take 500 mg by mouth daily      calcium carbonate (OS-ALEXI 500 MG [place]. [place]) 500 MG tablet Take 500 mg by mouth daily      cholecalciferol (VITAMIN D3) 25 mcg (1000 units) capsule Take 1 capsule by mouth daily.      labetalol (NORMODYNE) 200 MG tablet Take 1 tablet (200 mg) by mouth 3 times daily 270 tablet 1    losartan (COZAAR) 50 MG tablet Take 1 tablet (50 mg) by mouth daily 90 tablet 3    multivitamin w/minerals (THERA-VIT-M) tablet Take 1 tablet by mouth daily      rosuvastatin (CRESTOR) 5 MG tablet Take 1 tablet (5 mg) by mouth daily 90 tablet 3    traZODone (DESYREL) 50 MG tablet TAKE 1 TABLET(50 MG) BY MOUTH EVERY EVENING AS NEEDED FOR SLEEP 90 tablet 1       Physical Exam:   General:  Karina is alert, sitting up in chair, answering questions appropriately.  BP (!) 154/66   Pulse 71   Temp 99.7  F (37.6  C) (Oral)   Resp 16   Ht 1.549 m (5' 1\")   Wt 52.2 kg (115 lb)   SpO2 97%   BMI 21.73 kg/m     HEENT: Grossly normal, trachea midline, EOM's intact  Respiratory: Regular respirations, on room air. No flail chest on exam. No ecchymosis. Two lidocaine patches in place. Clear to auscultation bilaterally  Cardiac: Regular rate and rhythm, no murmurs  Abdomen: Soft, non-tender  Extremities: Warm, well perfused  Neuro: No focal deficits    Imaging:  Recent Results (from the past 48 hour(s))   CT " Aortic Survey w Contrast    Narrative    CT AORTIC SURVEY WITH CONTRAST 4/12/2024 9:44 AM    CLINICAL HISTORY: History of Vasile type A dissection and repair,  left chest and back pain. Evaluate the surgical repair for any  leaking.    TECHNIQUE: Noncontrast chest CT followed by CT angiogram chest abdomen  pelvis with IV contrast. Arterial phase through the chest, abdomen and  pelvis. 2D and 3D MIP reconstructions were preformed by the CT  technologist. Dose reduction techniques were used.     CONTRAST: 72mL ISOVUE-370    COMPARISON: 1/13/2023    FINDINGS:  ANGIOGRAM: Postop graft repair of the ascending aorta. Stable  thrombosed dissection involving the aortic arch, descending thoracic  aorta, and abdominal aorta. No new dissection. No leak. Great vessels  in the mediastinum are widely patent. Mild stenosis at the origin of  the celiac artery. There is a severe stenosis at the origin of the  left renal artery, similar to previous.    LUNGS AND PLEURA: Small left pneumothorax. Atelectasis left lung base.    MEDIASTINUM/AXILLAE: Postop changes as above. No hematoma or  lymphadenopathy. Mild coronary artery calcification.    HEPATOBILIARY: Normal.    PANCREAS: Normal.    SPLEEN: Normal.    ADRENAL GLANDS: Normal.    KIDNEYS/BLADDER: Stable left renal atrophy.    BOWEL: Colonic diverticulosis without signs of diverticulitis.    LYMPH NODES: Normal.    PELVIC ORGANS: Normal.    OTHER: None.    MUSCULOSKELETAL: Acute minimally displaced fractures of the  anterolateral left sixth, seventh, and eighth ribs.      Impression    IMPRESSION:  1.  Small left pneumothorax.  2.  Acute minimally displaced fractures of the left sixth through  eighth ribs.  3.  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    KERON BEASLEY MD         SYSTEM ID:  [id number]   CT Head  w/o Contrast    Narrative    CT OF THE HEAD WITHOUT CONTRAST 4/12/2024 1:00 PM     COMPARISON: Brain MR 2/18/2015.    HISTORY: Fall. Trauma. Pain.    TECHNIQUE: 5 mm thick axial CT images of the head were acquired  without IV contrast material.    FINDINGS: There is mild diffuse cerebral volume loss. There are subtle  patchy areas of decreased density in the cerebral white matter  bilaterally that are consistent with sequela of chronic small vessel  ischemic disease.    The ventricles and basal cisterns are within normal limits in  configuration given the degree of cerebral volume loss.  There is no  midline shift. There are no extra-axial fluid collections.    No intracranial hemorrhage, mass or recent infarct.    The visualized paranasal sinuses are well-aerated. There is no  mastoiditis. There are no fractures of the visualized bones.      Impression    IMPRESSION: Diffuse cerebral volume loss and cerebral white matter  changes consistent with chronic small vessel ischemic disease. No  evidence for acute intracranial pathology.    Radiation dose for this scan was reduced using automated exposure  control, adjustment of the mA and/or kV according to patient size, or  iterative reconstruction technique.     MIROSLAVA DEAL MD         SYSTEM ID:  [id number]       ASSESSMENT/PLAN:   This is an 83-year-old woman presenting after a presumed mechanical fall. CT demonstrates left anterolateral 6th-8th rib fractures with small left pneumothorax.    - No chest tube needed at this time  - CXR tomorrow AM  - Resp cares/ IS  - Multi-modal analgesia, limit opioids as able  - PT consult    We will continue to follow with you.  Thank you for allowing us to participate in the care of this patient and please call if there are further questions or concerns.    Time dedicated to Consultation: 25 minutes were spent at bedside, floor and unit with greater than 50% of the time spent on patient counseling and education, radiology review  and coordination of care.      Jayde Patino PA-C with Dr. Chris [facility]  Office: [phone]  Cell: [phone]

## 2024-04-12 NOTE — PHARMACY-ADMISSION MEDICATION HISTORY
Pharmacist Admission Medication History    Admission medication history is complete. The information provided in this note is only as accurate as the sources available at the time of the update.    Information Source(s): Patient, Hospital records, and CareEverywhere/SureScripts via in-person    Pertinent Information: None    Changes made to PTA medication list:  Added: Multivitamin  Deleted: Simethicone  Changed: None    Medication History Completed By: Zackary Tariq [facility] 4/12/2024 11:59 AM    PTA Med List   Medication Sig Last Dose    ALPRAZolam (XANAX) 0.25 MG tablet Take 1 tablet (0.25 mg) by mouth nightly as needed for anxiety or sleep 4/11/2024 at HS    amLODIPine (NORVASC) 5 MG tablet TAKE 1 TABLET(5 MG) BY MOUTH DAILY 4/12/2024 at AM    Ascorbic Acid (VITAMIN C PO) Take 500 mg by mouth daily 4/12/2024 at AM    calcium carbonate (OS-ALEXI 500 MG [place]. [place]) 500 MG tablet Take 500 mg by mouth daily 4/12/2024 at AM    cholecalciferol (VITAMIN D3) 25 mcg (1000 units) capsule Take 1 capsule by mouth daily. 4/12/2024 at AM    labetalol (NORMODYNE) 200 MG tablet Take 1 tablet (200 mg) by mouth 3 times daily 4/12/2024 at AM    losartan (COZAAR) 50 MG tablet Take 1 tablet (50 mg) by mouth daily 4/12/2024 at AM    multivitamin w/minerals (THERA-VIT-M) tablet Take 1 tablet by mouth daily 4/12/2024 at AM    rosuvastatin (CRESTOR) 5 MG tablet Take 1 tablet (5 mg) by mouth daily 4/11/2024 at PM    traZODone (DESYREL) 50 MG tablet TAKE 1 TABLET(50 MG) BY MOUTH EVERY EVENING AS NEEDED FOR SLEEP Past Week at HS PRN

## 2024-04-12 NOTE — ED NOTES
Bed: ED10  Expected date:   Expected time:   Means of arrival:   Comments:  Zaria 1-84F Chest Wall Pain

## 2024-04-12 NOTE — H&P
[facility]    History and Physical - Hospitalist Service       Date of Admission:  4/12/2024    Assessment & Plan      Karina Lopez is a 83 year old female admitted on 4/12/2024. 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  Postprocedure she went home.  She started having pain in her right eye from the procedure.  She took Tylenol and had a drink of lynda last night and then took 0.25 mg of alprazolam and fell asleep on her couch.  This morning woke up with significant pain in her left side of the chest and tenderness.  In the emergency room her CT scan of the chest showed  Left 6th-8th acute minimally displaced fractures.  Small left-sided pneumothorax      Possible fall prior to admission  Traumatic left-sided acute minimally displaced 6th-8th ribs  Small left-sided pneumothorax  Admitted to the hospital under observation status  Pain control with scheduled Tylenol 1000 mg every 8 hours, lidocaine patches 2 patches to apply to the left chest of the area of pain, Dilaudid 2 mg every 3 hours as needed for pain  And Dilaudid IV for breakthrough pain  Stop alprazolam as this might have contributed to her sleepwalking and resulting in fall which she did not remember the night prior to presentation    Thoracic surgery, trauma surgery consultations will be requested.  Appreciate their help.  Repeat chest x-ray in a.m.  Encourage incentive spirometry  Ambulate as tolerated  PT consultation will be requested prior to discharge    History of traumatic ascending aortic aneurysm status postrepair  Hypertension  Hyperlipidemia;  Continue me PTA multiple blood pressure meds with hold parameters  Hold statin for now while being  admitted as absorbs status    History of atrophic right kidney;  Mild hyponatremia  Sodium at 131   Gentle hydration normal saline at 100 mL/h stop after 1 L  Follow BMP    History of insomnia;  Stop alprazolam  Low-dose melatonin as needed might be better for her        Diet:  Regular diet  DVT Prophylaxis: Ambulate every shift  Alarcon Catheter: Not present  Lines: None     Cardiac Monitoring: None  Code Status:  Discussed with the patient she wants to be full code.  Want to be on long-term life support if she is in chronic vegetative state    Clinically Significant Risk Factors Present on Admission                  # Hypertension: Noted on problem list                 Disposition Plan     Medically Ready for Discharge: Anticipated Tomorrow       Discussed with ED physician and patient    Esperanza Mims MD  Hospitalist Service  [facility]  Securely message with [employer] (more info)  Text page via [facility] Paging/Directory     ______________________________________________________________________    Chief Complaint   Left-sided chest pain and chest wall tenderness with acute minimally displaced fractures of the left sixth through eighth ribs.  Small left-sided pneumothorax    History is obtained from the patient, medical record review in epic and discussion with ED physician Dr. Bobby Pascal    History of Present Illness   Karina Lopez is a 83 year old female with history of dissecting thoracic aortic aneurysm status post surgery, history of fibromuscular dysplasia, CKD stage III, hypertension, hyperlipidemia, insomnia, vitelliform macular dystrophy with retinal bleeding issues underwent an injection into her right eye which is sixth treatment for this issue yesterday.  Postprocedure she went home.  She started having pain in her right eye from the procedure.  When she took Tylenol around 3:30 PM which did not seem to help with her pain.  Then last night she had a 1 and half ounce of lynda  drink to help with the pain and did not seem to help with the pain.  She is planning on a trip to [place] in couple of weeks and was seen by her primary care provider in the clinic and talked about jet lag and insomnia and to help with the jet lag she was prescribed oral trazodone and alprazolam 0.25 mg p.o. 3 times daily as needed to take anxiety or sleep.  Because she was having significant pain last night in her right eye she took 1 dose of alprazolam.  Then she slept on her couch.  She woke up around 5:30 AM and started having left-sided chest pain under her left breast and was tender to palpation.  She called EMS and was brought into the emergency room and was evaluated by Dr. Bobby Pascal  Her vital signs showed temperature of 99.76  F afebrile pulse rate 65-58, blood pressure 161/58 most recent one 144/62 respiratory rate of 20 she was satting 97% on room air.    Her lab work showed sodium mildly low at 131 potassium normal at 4.4 chloride low at 93 bicarb normal at 26 BUN 20.2 creatinine 0.95.  All her LFTs are normal except for total bilirubin at 1.6 troponin is mildly elevated at 17.  CBC showed WBC count of 7.3 hemoglobin normal at 12.7 platelet count normal at 196.    A CT aortic survey with contrast was done and it showed acute minimally displaced fractures of the anterolateral left sixth seventh and eighth ribs.  Small left-sided pneumothorax.  Stable postoperative appearance following graft repair of the ascending thoracic aorta.  Stable appearance of the thrombosed chronic dissection involving the descending thoracic aorta and abdominal aorta.    Patient was requested to be hospitalized due to the left sixth through eighth rib fractures and a small left-sided pneumothorax    Patient was given IV Dilaudid in the emergency room which seemed to help with the pain, 1 L normal saline bolus given in the ED    ED physician discussed patient's presentation with thoracic surgeon Dr. Pena who recommended chest  x-ray repeating in am      Past Medical History    Past Medical History:   Diagnosis Date    Anemia     Aortic valve insufficiency, etiology of cardiac valve disease unspecified 04/13/2016    Best vitelliform macular dystrophy     CKD (chronic kidney disease) stage 3, GFR 30-59 ml/min (H) 12/09/2015    Former smoker     HTN (hypertension), benign     Hx of repair of dissecting thoracic aortic aneurysm, Vasile type A     +fibromuscular dysplasia    Hyperlipidemia LDL goal < 130     Insomnia     ASH (obstructive sleep apnea)(mild AHI=14) 04/12/2015    Osteoporosis, postmenopausal        Past Surgical History   Past Surgical History:   Procedure Laterality Date    CARDIAC SURGERY      HC OPEN TX METATARSAL FRACTURE      S/p thoracic aortic aneurysm repair  10/21/12    VASCULAR SURGERY         Prior to Admission Medications   Prior to Admission Medications   Prescriptions Last Dose Informant Patient Reported? Taking?   ALPRAZolam (XANAX) 0.25 MG tablet   No No   Sig: Take 1 tablet (0.25 mg) by mouth nightly as needed for anxiety or sleep   Ascorbic Acid (VITAMIN C PO)   Yes No   Sig: Take 500 mg by mouth daily   amLODIPine (NORVASC) 5 MG tablet   No No   Sig: TAKE 1 TABLET(5 MG) BY MOUTH DAILY   calcium carbonate (OS-ALEXI 500 MG [place]. [place]) 500 MG tablet   Yes No   Sig: Take 500 mg by mouth daily   cholecalciferol (VITAMIN D3) 25 mcg (1000 units) capsule  Self Yes No   Sig: Take 1 capsule by mouth daily.   labetalol (NORMODYNE) 200 MG tablet   No No   Sig: Take 1 tablet (200 mg) by mouth 3 times daily   losartan (COZAAR) 50 MG tablet   No No   Sig: Take 1 tablet (50 mg) by mouth daily   rosuvastatin (CRESTOR) 5 MG tablet   No No   Sig: Take 1 tablet (5 mg) by mouth daily   simethicone (MYLICON) 125 MG chewable tablet   No No   Sig: Take 1 tablet (125 mg) by mouth 2 times daily   traZODone (DESYREL) 50 MG tablet   No No   Sig: TAKE 1 TABLET(50 MG) BY MOUTH EVERY EVENING AS NEEDED FOR SLEEP      Facility-Administered  Medications: None        Review of Systems    The 10 point Review of Systems is negative other than noted in the HPI     Social History   I have reviewed this patient's social history and updated it with pertinent information if needed.  Social History     Tobacco Use    Smoking status: Former     Current packs/day: 0.00     Types: Cigarettes     Quit date: 1974     Years since quittin.3    Smokeless tobacco: Never    Tobacco comments:     light smoker    Vaping Use    Vaping status: Never Used   Substance Use Topics    Alcohol use: Yes     Alcohol/week: 3.0 standard drinks of alcohol     Types: 3 Glasses of wine per week     Comment: I-2 drinks with dinner    Drug use: No         Family History   I have reviewed this patient's family history and updated it with pertinent information if needed.  Family History   Problem Relation Age of Onset    Hypertension Father         best syndrome    Cancer Father         lung    Hypertension Paternal Grandmother         stroke    Alcohol/Drug Brother         best syndr         Allergies   Allergies   Allergen Reactions    Lisinopril      Cough      Simvastatin      myalgias        Physical Exam   Vital Signs: Temp: 99.7  F (37.6  C) Temp src: Oral BP: (!) 144/62 Pulse: 65   Resp: 27 SpO2: 97 %      Weight: 115 lbs 0 oz    General Appearance: Pleasant female lying comfortably in bed, not in acute distress,  complains of pain with any movement in her left side of chest  Eyes: Right-sided eye subconjunctival hemorrhage noted, no scleral icterus  HEENT: Head is atraumatic normocephalic neck is supple  Respiratory: Clear to auscultation bilaterally  Cardiovascular: Normal rate rhythm regular, no murmurs  GI: Soft, nontender nondistended bowel sounds positive  Skin: Warm and dry  Musculoskeletal: No clubbing cyanosis or edema  Neurologic: No focal weakness able to move all extremities  Psychiatric: Mood and affect are normal    Medical Decision Making       75 MINUTES SPENT  BY ME on the date of service doing chart review, history, exam, documentation & further activities per the note.      Data     I have personally reviewed the following data over the past 24 hrs:    7.3  \   12.7   / 196     131 (L) 93 (L) 20.2 /  109 (H)   4.4 26 0.95 \     ALT: 19 AST: 18 AP: 67 TBILI: 1.6 (H)   ALB: 4.3 TOT PROTEIN: 6.8 LIPASE: N/A     Trop: 14 BNP: N/A       Imaging results reviewed over the past 24 hrs:   Recent Results (from the past 24 hour(s))   CT Aortic Survey w Contrast    Narrative    CT AORTIC SURVEY WITH CONTRAST 4/12/2024 9:44 AM    CLINICAL HISTORY: History of Vasile type A dissection and repair,  left chest and back pain. Evaluate the surgical repair for any  leaking.    TECHNIQUE: Noncontrast chest CT followed by CT angiogram chest abdomen  pelvis with IV contrast. Arterial phase through the chest, abdomen and  pelvis. 2D and 3D MIP reconstructions were preformed by the CT  technologist. Dose reduction techniques were used.     CONTRAST: 72mL ISOVUE-370    COMPARISON: 1/13/2023    FINDINGS:  ANGIOGRAM: Postop graft repair of the ascending aorta. Stable  thrombosed dissection involving the aortic arch, descending thoracic  aorta, and abdominal aorta. No new dissection. No leak. Great vessels  in the mediastinum are widely patent. Mild stenosis at the origin of  the celiac artery. There is a severe stenosis at the origin of the  left renal artery, similar to previous.    LUNGS AND PLEURA: Small left pneumothorax. Atelectasis left lung base.    MEDIASTINUM/AXILLAE: Postop changes as above. No hematoma or  lymphadenopathy. Mild coronary artery calcification.    HEPATOBILIARY: Normal.    PANCREAS: Normal.    SPLEEN: Normal.    ADRENAL GLANDS: Normal.    KIDNEYS/BLADDER: Stable left renal atrophy.    BOWEL: Colonic diverticulosis without signs of diverticulitis.    LYMPH NODES: Normal.    PELVIC ORGANS: Normal.    OTHER: None.    MUSCULOSKELETAL: Acute minimally displaced fractures of  the  anterolateral left sixth, seventh, and eighth ribs.      Impression    IMPRESSION:  1.  Small left pneumothorax.  2.  Acute minimally displaced fractures of the left sixth through  eighth ribs.  3.  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    KERON BEASLEY MD         SYSTEM ID:  [id number]

## 2024-04-12 NOTE — TELEPHONE ENCOUNTER
Nurse Triage SBAR    Is this a 2nd Level Triage? NO    Situation: Left sided chest pain with pain radiating into her back and numbness in left arm- severe   Consent: not needed    Background: valve issues- aorta, history of aortic dissection  Yesterday she had a procedure on her eye- also causes her pain but this pain is different    Assessment:   10/10 pain - left side of her chest wrapping from under her breast to her armpit and into her back- numbness noted in the left arm  Having trouble bending due to pain-is not able to reach the floor  Indicates that worse with movement   Unable to take any deep breaths- needs to breathe very shallow  She also notes that she is having numbness in her left arm  Indicates that this sensation started last night but was not as bad  Pain is constant today    Protocol Recommended Disposition:       Recommendation: Advised to call for an ambulance. Patient agreeable to doing so. Declines additional questions.      911 recommended    Does the patient meet one of the following criteria for ADS visit consideration? 16+ years old, with an [facility] PCP     TIP  Providers, please consider if this condition is appropriate for management at one of our Acute and Diagnostic Services sites.     If patient is a good candidate, please use dotphrase <dot>triageresponse and select Refer to ADS to document.      Albania Ernandez RN 6:38 AM 4/12/2024  Reason for Disposition   Chest pain lasting longer than 5 minutes and ANY of the following:    history of heart disease  (i.e., heart attack, bypass surgery, angina, angioplasty, CHF; not just a heart murmur)    described as crushing, pressure-like, or heavy    age > 50    age > 30 AND at least one cardiac risk factor (i.e., hypertension, diabetes, obesity, smoker or strong family history of heart disease)    not relieved with nitroglycerin    Additional Information   Negative: SEVERE difficulty breathing (e.g., struggling for each breath, speaks in  single words)   Negative: Difficult to awaken or acting confused (e.g., disoriented, slurred speech)   Negative: Shock suspected (e.g., cold/pale/clammy skin, too weak to stand, low BP, rapid pulse)   Negative: Passed out (i.e., lost consciousness, collapsed and was not responding)    Protocols used: Chest Pain-A-AH

## 2024-04-12 NOTE — ED PROVIDER NOTES
History     Chief Complaint:  Chest Pain (L mid axillary into L breast, tender to touch/)    The history is provided by the patient.     Karina Lopez is a 83 year old female who presents to the ED with chest pain. Karina reports left-sided chest pain onset last night that became severe at 0530, noting that she was unable to bend down secondary to pain. She states that the pain wraps around her left side and radiates into her back. She reports use of Tylenol at 1530 yesterday after a right bleeding retina treatment that she undergoes every six weeks. 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 She denies a history of diabetes.    Additional history came after the CT results.  The patient was advised that she must have fallen.  She notes that maybe she did overnight.  It turns out that the patient took alprazolam 0.25 mg as a trial last night as a new sleep aid in anticipation of an upcoming trip to [facility] hoping that she could take a pill to help with jet lag.  The patient took 1 last night.  She clearly does not remember being up during the night walking around and falling at some point.  Nothing else is painful.  I advised the patient that these medications are very powerful and they can lead to amnesia as well as sleepwalking.  She should probably not take them.    Independent Historian:   None - Patient Only.    Review of External Notes:      Allergies:  Lisinopril  Simvastatin     Listed Medications:    Xanax  Norvasc  Normodyne  Cozaar  Crestor  Mylicon  Desyrel  Zoloft  Lisinopril  Zocor  Trandate    Past Medical and Surgical History:    Anemia  Aortic valve insufficiency, etiology of cardiac valve disease unspecified  Best vitelliform macular dystrophy  CKD  Former smoker  Hypertension  Hyperlipidemia  Insomnia  ASH  Osteoporosis, postmenopausal  Dissection  "of ascending aorta  Single kidney  Asymptomatic postmenopausal status  [facility]    Cardiac surgery  HC open TX metatarsal fracture  S/p thoracic aortic aneurysm repair  Vascular surgery    Family History:    family history includes Alcohol/Drug in her brother; Cancer in her father; Hypertension in her father and paternal grandmother.    Social History:   reports that she quit smoking about 50 years ago. Her smoking use included cigarettes. She has never used smokeless tobacco. She reports current alcohol use of about 3.0 standard drinks of alcohol per week. She reports that she does not use drugs.    Physical Exam   Patient Vitals for the past 24 hrs:   BP Temp Temp src Pulse Resp SpO2 Height Weight   04/12/24 1130 (!) 144/62 -- -- 65 27 97 % -- --   04/12/24 1100 (!) 163/98 -- -- 58 22 97 % -- --   04/12/24 1030 (!) 172/66 -- -- 63 27 92 % -- --   04/12/24 1000 139/73 -- -- 60 19 93 % -- --   04/12/24 0900 (!) 166/63 -- -- 58 12 95 % -- --   04/12/24 0830 (!) 170/63 -- -- 59 25 95 % -- --   04/12/24 0745 (!) 161/58 99.7  F (37.6  C) Oral 61 20 94 % 1.549 m (5' 1\") 52.2 kg (115 lb)     General: Resting comfortably on the gurney  Head:  The scalp, face, and head appear normal  Eyes:  The pupils are equal, round, and reactive to light    There is no nystagmus    Extraocular muscles are intact    Conjunctivae and sclerae are normal  ENT:    The nose is normal    Pinnae are normal    The oropharynx is normal  Neck:  Normal range of motion    There is no rigidity noted  CV:  Regular rate  Normal underlying rhythm     Normal S1/S2    No pathological murmur detected    Well-healed sternotomy scar  Resp:  Lungs are clear    There is no tachypnea    Non-labored    No rales    No wheezing   GI:  Abdomen is soft, there is no rigidity    No distension/tympani    No rebound tenderness     Non-surgical without peritoneal features at this time  MS:  Normal muscular tone    Symmetric motor strength    No major joint effusions    No " asymmetric leg swelling, no calf tenderness    The patient has multiple tender ribs in the left upper chest and these subaxillary region.  There is no obvious bruising here but this is reproducible and quite tender.  Skin:  No rash or acute skin lesions noted  Neuro:  Speech is normal and fluent, there is no aphasia    No motor deficits    Cranial nerves are intact  Psych: Awake. Alert.      Normal affect.  Appropriate interactions.    Emergency Department Course   ECG  ECG taken at 0731, ECG read at 0741  Normal sinus rhythm with sinus arrhythmia  Cannot rule out Anterior infarct, age undetermined  Abnormal ECG   No significant change as compared to prior, dated 1/13/23.  Rate 61 bpm. WA interval 140 ms. QRS duration 84 ms. QT/QTc 462/465 ms. P-R-T axes 24 -9 62.     Imaging:  CT Aortic Survey w Contrast   Final Result   IMPRESSION:   1.  Small left pneumothorax.   2.  Acute minimally displaced fractures of the left sixth through   eighth ribs.   3.  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      KERON BEASLEY MD            SYSTEM ID:  [id number]         Reports in this section have been read by the radiologist.    Laboratory:  Labs Ordered and Resulted from Time of ED Arrival to Time of ED Departure   COMPREHENSIVE METABOLIC PANEL - Abnormal       Result Value    Sodium 131 (*)     Potassium 4.4      Carbon Dioxide (CO2) 26      Anion Gap 12      Urea Nitrogen 20.2      Creatinine 0.95      GFR Estimate 60 (*)     Calcium 9.0      Chloride 93 (*)     Glucose 109 (*)     Alkaline Phosphatase 67      AST 18      ALT 19      Protein Total 6.8      Albumin 4.3      Bilirubin Total 1.6 (*)    TROPONIN T, HIGH SENSITIVITY - Abnormal    Troponin T, High Sensitivity 17 (*)    TROPONIN T, HIGH SENSITIVITY - Normal    Troponin T, High Sensitivity 14      CBC WITH PLATELETS AND DIFFERENTIAL    WBC Count 7.3      RBC Count 4.09      Hemoglobin 12.7      Hematocrit 37.4      MCV 91      MCH 31.1      MCHC 34.0      RDW 13.0      Platelet Count 196      % Neutrophils 74      % Lymphocytes 12      % Monocytes 9      % Eosinophils 5      % Basophils 0      % Immature Granulocytes 0      NRBCs per 100 WBC 0      Absolute Neutrophils 5.4      Absolute Lymphocytes 0.9      Absolute Monocytes 0.7      Absolute Eosinophils 0.3      Absolute Basophils 0.0      Absolute Immature Granulocytes 0.0      Absolute NRBCs 0.0       Emergency Department Course & Assessments:       Interventions/ED Medications:  Medications   sodium chloride 0.9% BOLUS 1,000 mL (0 mLs Intravenous Stopped 4/12/24 1026)   iopamidol (ISOVUE-370) solution 72 mL (72 mLs Intravenous $Given 4/12/24 0922)   Saline flush (80 mLs Intravenous $Given 4/12/24 0922)   HYDROmorphone (PF) (DILAUDID) injection 0.3 mg (0.3 mg Intravenous $Given 4/12/24 1017)     Independent Interpretation of Radiology Studies by Dr. Pascal  None    Assessments/Consultations/Discussion of Management:  ED Course as of 04/12/24 1153   Fri Apr 12, 2024   0759 I obtained history and examined the patient as noted above.   0836 I rechecked the patient.   1048 I spoke with Dr. Rowe, thoracis surgery, regarding the patient's history and presentation in the emergency department today.    1051 I rechecked the patient. She tells me that she took 0.25mg alprazolam for the first time last night in preparation for a trip to [facility] and could have fallen while sleep walking. I discussed findings and admission with the patient. All questions answered.   1110 I spoke with Dr. Mims of the hospitalist team regarding the patient, who accepted the patient for admission.     Disposition:  The patient was admitted to the hospital under the care of Dr. Mims.      Impression & Plan    Medical Decision Making:  This patient presents to the emergency department  complaining of left upper chest wall pain.  She does have a history of a dissection involving the ascending aorta ([place] A).  This was repaired with a graft/shield and has not given her any difficulties.  The patient underwent CT scan of the chest to ensure no difficulties with her prior aortic repair and incidentally we found 3 rib fractures and a pneumothorax on the left.  With further history it turns out that the patient took her first dose of alprazolam last evening as a sleep aid.  She must of been sleepwalking and developed amnesia during the night as she does not recall falling and injuring herself.  I advised her not to take this medication in the future.  Patient will be admitted to the hospital for oxygen therapy, pain control, and general surgery consultation for a trauma evaluation.  Thoracic surgery, Dr. Rowe has been consulted, he is not recommending an acute tube thoracostomy at this time given the small size of the pneumothorax.    Diagnosis:    ICD-10-CM    1. Closed fracture of multiple ribs of left side, initial encounter  S22.42XA       2. Traumatic pneumothorax, initial encounter  S27.0XXA       3. Medication side effects  T88.7XXA         Scribe Disclosure:  Rolando CLEVELAND, am serving as a scribe at 7:37 AM on 4/12/2024 to document services personally performed by Bobby Pascal MD based on my observations and the provider's statements to me.   4/12/2024   Bobby Pascal MD Rock, Michael P, MD  04/12/24 [id number]

## 2024-04-12 NOTE — ED NOTES
"[facility]  ED Nurse Handoff Report    ED Chief complaint: Chest Pain (L mid axillary into L breast, tender to touch/)      ED Diagnosis:   Final diagnoses:   None       Code Status: Full Code    Allergies:   Allergies   Allergen Reactions    Lisinopril      Cough      Simvastatin      myalgias       Patient Story: Pain started last night around 2130, sharp pain in L midaxillary radiating into L breast. Pain is sharp with inhale and tender to the touch. No relief with Tylenol. Pain still present this AM with no relief from tylenol again. Pt started new \"sleeping medication\" and took one last night before bed.  Focused Assessment:  Complains of 10/10 pain in L side.     Treatments and/or interventions provided:   Labs Ordered and Resulted from Time of ED Arrival to Time of ED Departure   COMPREHENSIVE METABOLIC PANEL - Abnormal       Result Value    Sodium 131 (*)     Potassium 4.4      Carbon Dioxide (CO2) 26      Anion Gap 12      Urea Nitrogen 20.2      Creatinine 0.95      GFR Estimate 60 (*)     Calcium 9.0      Chloride 93 (*)     Glucose 109 (*)     Alkaline Phosphatase 67      AST 18      ALT 19      Protein Total 6.8      Albumin 4.3      Bilirubin Total 1.6 (*)    TROPONIN T, HIGH SENSITIVITY - Abnormal    Troponin T, High Sensitivity 17 (*)    TROPONIN T, HIGH SENSITIVITY - Normal    Troponin T, High Sensitivity 14     CBC WITH PLATELETS AND DIFFERENTIAL    WBC Count 7.3      RBC Count 4.09      Hemoglobin 12.7      Hematocrit 37.4      MCV 91      MCH 31.1      MCHC 34.0      RDW 13.0      Platelet Count 196      % Neutrophils 74      % Lymphocytes 12      % Monocytes 9      % Eosinophils 5      % Basophils 0      % Immature Granulocytes 0      NRBCs per 100 WBC 0      Absolute Neutrophils 5.4      Absolute Lymphocytes 0.9      Absolute Monocytes 0.7      Absolute Eosinophils 0.3      Absolute Basophils 0.0      Absolute Immature Granulocytes 0.0      Absolute NRBCs 0.0        CT Aortic " "Survey w Contrast   Final Result   IMPRESSION:   1.  Small left pneumothorax.   2.  Acute minimally displaced fractures of the left sixth through   eighth ribs.   3.  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      KERON BEASLEY MD            SYSTEM ID:  [id number]         Patient's response to treatments and/or interventions: Pain medication and O2 effective in decreasing pain.    To be done/followed up on inpatient unit:  See orders.    Does this patient have any cognitive concerns?:  forgetful    Activity level - Baseline/Home:independent  Activity Level - Current:   Stand with Assist    Patient's Preferred language: English   Needed?: No    Isolation: None  Infection: Not Applicable  Patient tested for COVID 19 prior to admission: NO  Bariatric?: No    Vital Signs:   Vitals:    04/12/24 0745 04/12/24 0830 04/12/24 0900 04/12/24 1000   BP: (!) 161/58 (!) 170/63 (!) 166/63 139/73   Pulse: 61 59 58 60   Resp: 20 25 12 19   Temp: 99.7  F (37.6  C)      TempSrc: Oral      SpO2: 94% 95% 95% 93%   Weight: 52.2 kg (115 lb)      Height: 1.549 m (5' 1\")          Cardiac Rhythm:     Was the PSS-3 completed:   No -NA  What interventions are required if any?               Family Comments: NA  OBS brochure/video discussed/provided to patient/family: N/A              Name of person given brochure if not patient: NA              Relationship to patient: NA    For the majority of the shift this patient's behavior was Green.   Behavioral interventions performed were NA.    ED NURSE PHONE NUMBER: [phone]        "

## 2024-04-12 NOTE — ED TRIAGE NOTES
Pain started last night around 2130, sharp pain in L midaxillary radiating into L breast. Pain is sharp with inhale and tender to the touch. No relief with Tylenol. Pain still present this AM with no relief from tylenol again.

## 2024-04-13 ENCOUNTER — APPOINTMENT (OUTPATIENT)
Dept: PHYSICAL THERAPY | Facility: CLINIC | Age: 84
End: 2024-04-13
Attending: INTERNAL MEDICINE
Payer: COMMERCIAL

## 2024-04-13 ENCOUNTER — APPOINTMENT (OUTPATIENT)
Dept: GENERAL RADIOLOGY | Facility: CLINIC | Age: 84
End: 2024-04-13
Attending: INTERNAL MEDICINE
Payer: COMMERCIAL

## 2024-04-13 LAB
ANION GAP SERPL CALCULATED.3IONS-SCNC: 11 MMOL/L (ref 7–15)
BUN SERPL-MCNC: 15.5 MG/DL (ref 8–23)
CALCIUM SERPL-MCNC: 8.7 MG/DL (ref 8.8–10.2)
CHLORIDE SERPL-SCNC: 104 MMOL/L (ref 98–107)
CREAT SERPL-MCNC: 0.79 MG/DL (ref 0.51–0.95)
DEPRECATED HCO3 PLAS-SCNC: 23 MMOL/L (ref 22–29)
EGFRCR SERPLBLD CKD-EPI 2021: 75 ML/MIN/1.73M2
ERYTHROCYTE [DISTWIDTH] IN BLOOD BY AUTOMATED COUNT: 13.4 % (ref 10–15)
GLUCOSE SERPL-MCNC: 91 MG/DL (ref 70–99)
HCT VFR BLD AUTO: 34.6 % (ref 35–47)
HGB BLD-MCNC: 11.4 G/DL (ref 11.7–15.7)
MCH RBC QN AUTO: 30.6 PG (ref 26.5–33)
MCHC RBC AUTO-ENTMCNC: 32.9 G/DL (ref 31.5–36.5)
MCV RBC AUTO: 93 FL (ref 78–100)
PLATELET # BLD AUTO: 173 10E3/UL (ref 150–450)
POTASSIUM SERPL-SCNC: 4 MMOL/L (ref 3.4–5.3)
RBC # BLD AUTO: 3.73 10E6/UL (ref 3.8–5.2)
SODIUM SERPL-SCNC: 138 MMOL/L (ref 135–145)
WBC # BLD AUTO: 5.3 10E3/UL (ref 4–11)

## 2024-04-13 PROCEDURE — 71045 X-RAY EXAM CHEST 1 VIEW: CPT

## 2024-04-13 PROCEDURE — 97530 THERAPEUTIC ACTIVITIES: CPT | Mod: GP

## 2024-04-13 PROCEDURE — G0378 HOSPITAL OBSERVATION PER HR: HCPCS

## 2024-04-13 PROCEDURE — 97116 GAIT TRAINING THERAPY: CPT | Mod: GP

## 2024-04-13 PROCEDURE — 258N000003 HC RX IP 258 OP 636: Performed by: INTERNAL MEDICINE

## 2024-04-13 PROCEDURE — 99232 SBSQ HOSP IP/OBS MODERATE 35: CPT | Performed by: INTERNAL MEDICINE

## 2024-04-13 PROCEDURE — 80048 BASIC METABOLIC PNL TOTAL CA: CPT | Performed by: INTERNAL MEDICINE

## 2024-04-13 PROCEDURE — 97161 PT EVAL LOW COMPLEX 20 MIN: CPT | Mod: GP

## 2024-04-13 PROCEDURE — 36415 COLL VENOUS BLD VENIPUNCTURE: CPT | Performed by: INTERNAL MEDICINE

## 2024-04-13 PROCEDURE — 85027 COMPLETE CBC AUTOMATED: CPT | Performed by: INTERNAL MEDICINE

## 2024-04-13 PROCEDURE — 250N000013 HC RX MED GY IP 250 OP 250 PS 637: Performed by: INTERNAL MEDICINE

## 2024-04-13 RX ADMIN — HYDROMORPHONE HYDROCHLORIDE 2 MG: 2 TABLET ORAL at 08:28

## 2024-04-13 RX ADMIN — LABETALOL HYDROCHLORIDE 200 MG: 200 TABLET, FILM COATED ORAL at 14:08

## 2024-04-13 RX ADMIN — LABETALOL HYDROCHLORIDE 200 MG: 200 TABLET, FILM COATED ORAL at 08:28

## 2024-04-13 RX ADMIN — LOSARTAN POTASSIUM 50 MG: 50 TABLET, FILM COATED ORAL at 08:28

## 2024-04-13 RX ADMIN — ACETAMINOPHEN 1000 MG: 500 TABLET, FILM COATED ORAL at 11:39

## 2024-04-13 RX ADMIN — AMLODIPINE BESYLATE 5 MG: 5 TABLET ORAL at 08:28

## 2024-04-13 RX ADMIN — SODIUM CHLORIDE: 9 INJECTION, SOLUTION INTRAVENOUS at 06:24

## 2024-04-13 RX ADMIN — HYDROMORPHONE HYDROCHLORIDE 2 MG: 2 TABLET ORAL at 15:54

## 2024-04-13 RX ADMIN — LIDOCAINE 2 PATCH: 4 PATCH TOPICAL at 08:29

## 2024-04-13 RX ADMIN — ACETAMINOPHEN 1000 MG: 500 TABLET, FILM COATED ORAL at 03:54

## 2024-04-13 RX ADMIN — HYDROMORPHONE HYDROCHLORIDE 2 MG: 2 TABLET ORAL at 23:35

## 2024-04-13 RX ADMIN — ACETAMINOPHEN 1000 MG: 500 TABLET, FILM COATED ORAL at 20:04

## 2024-04-13 RX ADMIN — HYDROMORPHONE HYDROCHLORIDE 2 MG: 2 TABLET ORAL at 00:10

## 2024-04-13 RX ADMIN — HYDROMORPHONE HYDROCHLORIDE 2 MG: 2 TABLET ORAL at 20:05

## 2024-04-13 RX ADMIN — LABETALOL HYDROCHLORIDE 200 MG: 200 TABLET, FILM COATED ORAL at 20:05

## 2024-04-13 ASSESSMENT — ACTIVITIES OF DAILY LIVING (ADL)
ADLS_ACUITY_SCORE: 44
ADLS_ACUITY_SCORE: 42
ADLS_ACUITY_SCORE: 44
DEPENDENT_IADLS:: INDEPENDENT
ADLS_ACUITY_SCORE: 42
ADLS_ACUITY_SCORE: 36
ADLS_ACUITY_SCORE: 42
ADLS_ACUITY_SCORE: 36
ADLS_ACUITY_SCORE: 42
ADLS_ACUITY_SCORE: 36
ADLS_ACUITY_SCORE: 42
ADLS_ACUITY_SCORE: 42
ADLS_ACUITY_SCORE: 44
ADLS_ACUITY_SCORE: 44
ADLS_ACUITY_SCORE: 42
ADLS_ACUITY_SCORE: 44
ADLS_ACUITY_SCORE: 42

## 2024-04-13 NOTE — PROGRESS NOTES
Observation goals  PRIOR TO DISCHARGE        Comments: -diagnostic tests and consults completed and resulted:Not met, needs a AM xray, and PT consult  -vital signs normal or at patient baseline:Met  Nurse to notify provider when observation goals have been met and patient is ready for discharge.

## 2024-04-13 NOTE — PROGRESS NOTES
[facility]    Medicine Progress Note - Hospitalist Service    Date of Admission:  4/12/2024    Assessment & Plan       Karina Lopez is a 83 year old female admitted on 4/12/2024. 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  Postprocedure she went home.  She started having pain in her right eye from the procedure.  She took Tylenol and had a drink of lynda last night and then took 0.25 mg of alprazolam and fell asleep on her couch.  This morning woke up with significant pain in her left side of the chest and tenderness.  In the emergency room her CT scan of the chest showed  Left 6th-8th acute minimally displaced fractures.  Small left-sided pneumothorax        Possible fall prior to admission  Traumatic left-sided acute minimally displaced 6th-8th ribs  Small left-sided pneumothorax  Admitted to the hospital under observation status  Pain control with scheduled Tylenol 1000 mg every 8 hours, lidocaine patches 2 patches to apply to the left chest of the area of pain, Dilaudid 2 mg every 3 hours as needed for pain  And Dilaudid IV for breakthrough pain  Stop alprazolam as this might have contributed to her sleepwalking and resulting in fall which she did not remember the night prior to presentation     Thoracic surgery consulted  Repeat chest x-ray showed stable pneumothorax  Outpatient follow-up with PCP in 7 to 10 days with repeat chest x-ray recommended per thoracic surgery    Patient is still having significant pain with ambulation.  Continue current pain regimen     History of traumatic ascending aortic aneurysm status postrepair  Hypertension  Hyperlipidemia;  Continue me PTA multiple blood pressure meds with  hold parameters  Hold statin for now while being admitted as absorbs status     History of atrophic right kidney;  Mild hyponatremia  Sodium at 131   Gentle hydration normal saline at 100 mL/h stop after 1 L  Follow BMP     History of insomnia;  Stop alprazolam  Low-dose melatonin as needed might be better for her        Diet:  Regular diet  DVT Prophylaxis: Ambulate every shift  Alarcon Catheter: Not present  Lines: None     Cardiac Monitoring: None  Code Status:  Discussed with the patient she wants to be full code.  Want to be on long-term life support if she is in chronic vegetative state        Clinically Significant Risk Factors Present on Admission                  # Hypertension: Noted on problem list                        Disposition Plan     Medically Ready for Discharge: Anticipated Tomorrow with home health care services           Discussed with bedside RN and patient     Esperanza Mims MD  Hospitalist Service  [facility]  Securely message with [employer] (more info)  Text page via [email or web address] [email or web address]         Observation Goals: -diagnostic tests and consults completed and resulted, -vital signs normal or at patient baseline, Nurse to notify provider when observation goals have been met and patient is ready for discharge.  Diet: Regular Diet Adult        Clinically Significant Risk Factors Present on Admission                  # Hypertension: Noted on problem list          # Financial/Environmental Concerns: none         Disposition Plan     Medically Ready for Discharge: Anticipated Tomorrow             Esperanza Mims MD  Hospitalist Service  [facility]  Securely message with [employer] (more info)  Text page via [email or web address]   ______________________________________________________________________    Interval History   Patient is sitting comfortably in chair.  Denies any pain at rest.  With ambulation she is her pain is intolerable.  Discussed with  rib fractures it is going to be painful for a few weeks.  Repeat chest x-ray showed stable pneumothorax.  PT evaluation pending currently.  Discussed with patient about going to TCU versus going home with home health care services.  She wants to go home with home health care services.  Social work consultation requested.  No other acute issues    Physical Exam   Vital Signs: Temp: 97.9  F (36.6  C) Temp src: Oral BP: (!) 140/61 Pulse: 63   Resp: 20 SpO2: 92 % O2 Device: None (Room air)    Weight: 114 lbs 11.2 oz    General Appearance: Alert awake, not in acute distress  Respiratory: Clear to auscultation bilaterally  Cardiovascular: Normal rate rhythm regular  GI: Soft, nontender nondistended bowel sounds positive  Skin: Warm and dry  Other:      Medical Decision Making       49 MINUTES SPENT BY ME on the date of service doing chart review, history, exam, documentation & further activities per the note.      Data     I have personally reviewed the following data over the past 24 hrs:    5.3  \   11.4 (L)   / 173     138 104 15.5 /  91   4.0 23 0.79 \       Imaging results reviewed over the past 24 hrs:   Recent Results (from the past 24 hour(s))   XR Chest Port 1 View    Narrative    EXAM: XR CHEST PORT 1 VIEW  LOCATION: [facility]  DATE: 4/13/2024    INDICATION: pneumothorax  COMPARISON: [facility] CAP 04/12/2024      Impression    IMPRESSION: Small left apical pneumothorax again noted measuring 1.1 cm. Left lower lobe atelectasis and trace effusion is unchanged.    Poststernotomy changes with right infraclavicular clips. Left lung is clear. Heart and pulmonary vascularity are normal. S-shaped thoracolumbar scoliosis.

## 2024-04-13 NOTE — PLAN OF CARE
Neuro: A&O x4  Tele/cardiac: N/A  Respiration: RA  Activity: SBA  Pain: L chest, dilaudid given this shift  Drips: PIV SL  Drains/tubes: none  Skin: intact  GI/: continent, regular diet  Aggression color: green  Isolation: none  Plan: will discharge home with home care    -diagnostic tests and consults completed and resulted:  Met  -vital signs normal or at patient baseline:Met  Nurse to notify provider when observation goals have been met and patient is ready for discharge.

## 2024-04-13 NOTE — PROGRESS NOTES
RECEIVING UNIT ED HANDOFF REVIEW    ED Nurse Handoff Report was reviewed by: Lorenza Newsome RN on April 12, 2024 at 8:01 PM

## 2024-04-13 NOTE — PLAN OF CARE
Goal Outcome Evaluation:      Plan of Care Reviewed With: patient    Overall Patient Progress: improvingOverall Patient Progress: improving    Outcome Evaluation: D/C tomorrow w/PT/OT    PRIMARY Concern: Fall w/L 6th-8th rib fx & traumatic small pneumothorax  SAFETY RISK Concerns (fall risk, behaviors, etc.): Fall risk       Isolation/Type: NA  Tests/Procedures for NEXT shift: NA  Consults? (Pending/following, signed-off?) all signed off  Where is patient from? (Home, TCU, etc.): Home alone in condo  Other Important info for NEXT shift: Pain increases w/activity,using IS  Anticipated DC date & active delays: Sunday   _____________________________________________________________________________  SUMMARY NOTE:        Orientation/Cognitive: A&Ox4   Observation Goals (Met/ Not Met): Met  Mobility Level/Assist Equipment: SBA  Antibiotics & Plan (IV/po, length of tx left): NA  Pain Management: PRN po dilaudid & scheduled tylenol   Tele/VS/O2: VSS on RA ex HTN  ABNL Lab/BG: see results  Diet: Regular  Bowel/Bladder: Continent  Skin Concerns: WDL  Drains/Devices: SL  Patient Stated Goal for Today: Pain control   Observation goals  PRIOR TO DISCHARGE        Comments: -diagnostic tests and consults completed and resulted:  Met  -vital signs normal or at patient baseline:Met  Nurse to notify provider when observation goals have been met and patient is ready for discharge.

## 2024-04-13 NOTE — CONSULTS
"Care Management Follow Up  --previous consult done at 11:53 am----  Length of Stay (days): 0    Expected Discharge Date: 04/14/2024     Concerns to be Addressed: discharge planning     Patient plan of care discussed at interdisciplinary rounds: Yes    Anticipated Discharge Disposition: Home, Home Care     Anticipated Discharge Services: None  Anticipated Discharge DME: None    Patient/family educated on Medicare website which has current facility and service quality ratings:    Education Provided on the Discharge Plan:    Patient/Family in Agreement with the Plan: yes    Referrals Placed by CM/SW:    Private pay costs discussed: Not applicable    Additional Information:  Patient has been accepted by [facility] for home PT. Writer also gave patient multiple resources for delivery and available resources in her community.  PCP follow up made:   May 01, 2024  7:30 AM  (Arrive by 7:15 AM)  ED/Hospital Follow Up with Sunita Siddiqui MD  [facility] ([facility] ) [address] [place] [address], [address]  [facility] [address]  [phone]   Please plan to arrive at the clinic at your \"Arrive By\" time for your appointment. Our late policy will be enforced based on this time.     This appointment is the soonest available in her clinic, writer advised patient to call on Monday and ask to speak to the clinic directly to get in sooner.    Genny Wong RN      "

## 2024-04-13 NOTE — PLAN OF CARE
Physical Therapy Discharge Summary    Reason for therapy discharge:    All goals and outcomes met, no further needs identified.    Progress towards therapy goal(s). See goals on Care Plan in Epic electronic health record for goal details.  Goals met    Therapy recommendation(s):    Continued therapy is recommended.  Rationale/Recommendations:  Patient is below baseline mobility levels, but able to ambulate and transfer with use of FWW and mod I at this time. Recommend discharge home with use of FWW and use of ambulation program in order to improve independence with functional mobility. Recommend discharge with HHPT to continue increasing safety and independence with functional mobility to return to PLOF without use of assistive device. Pt noting that she has supportive friends who live in her building who are willing and able to provide rides and assist with higher level IADLs such as laundry and groceries.

## 2024-04-13 NOTE — PLAN OF CARE
Goal Outcome Evaluation:      Plan of Care Reviewed With: patient    Overall Patient Progress: no changeOverall Patient Progress: no change      PRIMARY Concern: Fall with L sided closed fracture 6th-8th rib and traumatic small pneumothorax  SAFETY RISK Concerns (fall risk, behaviors, etc.): Fall risk       Isolation/Type: NA  Tests/Procedures for NEXT shift: chest xray  Consults? (Pending/following, signed-off?) Trauma surgery and PT to consult  Where is patient from? (Home, TCU, etc.): Home  Other Important info for NEXT shift: Pain increases with ambulation, pt has been using IS  Anticipated DC date & active delays: 1-2 days   _____________________________________________________________________________  SUMMARY NOTE:        Orientation/Cognitive: A&Ox4   Observation Goals (Met/ Not Met): No tmet  Mobility Level/Assist Equipment: SBA w/ IV pole  Antibiotics & Plan (IV/po, length of tx left): NA  Pain Management: PRN p.o dilaudid and scheduled tylenol   Tele/VS/O2: VSS on RA ex HTN  ABNL Lab/BG: NA:131 Bili total:1.6 Trop:17,14   Diet: Regular  Bowel/Bladder: Continent  Skin Concerns: WDL  Drains/Devices: PIV infusing NS@100ml/hr  Patient Stated Goal for Today: Pain control   Observation goals  PRIOR TO DISCHARGE        Comments: -diagnostic tests and consults completed and resulted:Not met, needs a AM xray, and PT consult  -vital signs normal or at patient baseline:Met  Nurse to notify provider when observation goals have been met and patient is ready for discharge.

## 2024-04-13 NOTE — PROGRESS NOTES
"   04/13/24 1035   Appointment Info   Signing Clinician's Name / Credentials (PT) Johann Rocha, PT, DPT   Quick Adds   Quick Adds Certification       Present no   Living Environment   People in Home alone   Current Living Arrangements condominium   Home Accessibility no concerns   Transportation Anticipated car, drives self;family or friend will provide   Living Environment Comments Pt lives in a condo with elevator access. Typically drives, but has a close friend who lives in her building who will provide rides after d/c.   Self-Care   Usual Activity Tolerance good   Current Activity Tolerance moderate   Regular Exercise Yes   Activity/Exercise Type walking   Exercise Amount/Frequency daily   Equipment Currently Used at Home none   Fall history within last six months yes   Number of times patient has fallen within last six months 1   Activity/Exercise/Self-Care Comment Pt reporting IND at baseline with mobility and ADLs without use of AD. Owns a cane, but does not use it.   General Information   Onset of Illness/Injury or Date of Surgery 04/12/24   Referring Physician Esperanza Mims MD   Patient/Family Therapy Goals Statement (PT) Per chart review, \"Karina Lopez is a 83 year old female admitted on 4/12/2024. 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  Postprocedure she went home.  She started having pain in her right eye from the procedure.  She took Tylenol and had a drink of lynda last night and then took 0.25 mg of alprazolam and fell asleep on her couch.  This morning woke up with significant pain in her left side of the chest and tenderness.  In the emergency room her CT scan of the chest showed  Left 6th-8th " "acute minimally displaced fractures.  Small left-sided pneumothorax.\"   Cognition   Affect/Mental Status (Cognition) WNL   Orientation Status (Cognition) oriented x 4   Follows Commands (Cognition) WNL   Integumentary/Edema   Integumentary/Edema no deficits were identifed   Posture    Posture Protracted shoulders   Range of Motion (ROM)   Range of Motion ROM is WFL   ROM Comment mild limitation in thoracic AROM 2/2 rib fractures   Strength (Manual Muscle Testing)   Strength (Manual Muscle Testing) Deficits observed during functional mobility   Strength Comments mild functional endurance deficit   Bed Mobility   Comment, (Bed Mobility) IND supine<>sit   Transfers   Comment, (Transfers) SBA sit>stand   Gait/Stairs (Locomotion)   Distance in Feet (Gait) 20' eval   Comment, (Gait/Stairs) pt ambulated 20' w/ no AD and SBA for eval   Balance   Balance Comments mild dynamic balance impairment   Sensory Examination   Sensory Perception patient reports no sensory changes   Clinical Impression   Criteria for Skilled Therapeutic Intervention Yes, treatment indicated   PT Diagnosis (PT) impaired functional mobility   Influenced by the following impairments impaired functional endurance and mild dynamic balance impairment   Functional limitations due to impairments limited IND with mobility   Clinical Presentation (PT Evaluation Complexity) stable   Clinical Presentation Rationale clinical judgement   Clinical Decision Making (Complexity) low complexity   Planned Therapy Interventions (PT) balance training;bed mobility training;home exercise program;gait training;patient/family education;ROM (range of motion);strengthening;transfer training;progressive activity/exercise;home program guidelines   Risk & Benefits of therapy have been explained care plan/treatment goals reviewed;evaluation/treatment results reviewed;risks/benefits reviewed;current/potential barriers reviewed;participants voiced agreement with care plan;participants " included;patient   PT Total Evaluation Time   PT Eval, Low Complexity Minutes (97881) 6   Therapy Certification   Start of care date 04/13/24   Certification date from 04/13/24   Certification date to 04/13/24   Medical Diagnosis L rib fractures 6-8   Physical Therapy Goals   PT Frequency One time eval and treatment only   PT Predicted Duration/Target Date for Goal Attainment 04/13/24   PT Goals Bed Mobility;Transfers;Gait   PT: Bed Mobility Independent;Supine to/from sit;Goal Met;Completed   PT: Transfers Modified independent;Sit to/from stand;Bed to/from chair;Assistive device;Goal Met;Completed   PT: Gait Modified independent;Rolling walker;Greater than 200 feet;Goal Met;Completed   Interventions   Interventions Quick Adds Gait Training;Therapeutic Procedure;Therapeutic Activity   Therapeutic Activity   Therapeutic Activities: dynamic activities to improve functional performance Minutes (34496) 8   Symptoms Noted During/After Treatment Increased pain   Treatment Detail/Skilled Intervention Greeted pt upon arrival to room. Pt agreeable to working with PT. Sit>stand w/ SBA. Cueing for safe and efficient sit<>stand procedure including scooting to the front edge of the chair, to lean forward with nose over toes, and hand and foot placement. Education provided regarding use of ambulation program upon returning home with use of FWW for mobility. Pt demonstrating good understanding of education and recommendations. Following ambulation, sit<>stand w/ mod I from bed with use of FWW. Demonstrating good independence with transfers with use of FWW. Pt left with all needs met and call light within reach.   Gait Training   Gait Training Minutes (88161) 16   Symptoms Noted During/After Treatment (Gait Training) fatigue;increased pain   Treatment Detail/Skilled Intervention Pt ambulated 125' w/ no AD and SBA. Demonstrating slow gait speed with mild forward flexed posture with no overt LOB throughout. Cueing to keep head, chest,  and eyes upward with moderate improvement in upright posture. Pt ambulated 300' w/ FWW and SBA, quickly progressing to mod I throughout. Demonstrating good upright posture without any losses of balance throughout. Pt noting improved feelings of stability with use of FWW and improve gait speed.   Distance in Feet 125' & 300'   PT Discharge Planning   PT Plan goals met, d/c   PT Discharge Recommendation (DC Rec) home with assist;home with home care physical therapy   PT Rationale for DC Rec Patient is below baseline mobility levels, but able to ambulate and transfer with use of FWW and mod I at this time. Recommend discharge home with use of FWW and use of ambulation program in order to improve independence with functional mobility. Recommend discharge with HHPT to continue increasing safety and independence with functional mobility to return to PLOF without use of assistive device. Pt noting that she has supportive friends who live in her building who are willing and able to provide rides and assist with higher level IADLs such as laundry and groceries.   PT Brief overview of current status mod I w/ FWW   Total Session Time   Timed Code Treatment Minutes 24   Total Session Time (sum of timed and untimed services) 30     [facility]  OUTPATIENT PHYSICAL THERAPY EVALUATION  PLAN OF TREATMENT FOR OUTPATIENT REHABILITATION  (COMPLETE FOR INITIAL CLAIMS ONLY)  Patient's Last Name, First Name, M.I.  YOB: 1940  Karina Lopez                        Provider's Name  [facility] Medical Record No.  [id number]                             Onset Date:  04/12/24   Start of Care Date:  04/13/24   Type:     _X_PT   ___OT   ___SLP Medical Diagnosis:  L rib fractures 6-8              PT Diagnosis:  impaired functional mobility Visits from SOC:  1     See note for plan of treatment, functional goals and certification details    I CERTIFY THE NEED FOR  THESE SERVICES FURNISHED UNDER        THIS PLAN OF TREATMENT AND WHILE UNDER MY CARE     (Physician co-signature of this document indicates review and certification of the therapy plan).

## 2024-04-13 NOTE — PROGRESS NOTES
THORACIC SURGERY    CXR this am shows a very small apical ptx.    No intervention needed    D/C as per hospitalist    Should F/U with PCP and CXR in 7-10 days    Will see PRN.    SHANNAN ROLDAN MD [facility] ONCOLOGY THORACIC SURGERY  CELL:  [phone]  OFFICE: [phone]

## 2024-04-13 NOTE — PROGRESS NOTES
Observation goals  PRIOR TO DISCHARGE        Comments: -diagnostic tests and consults completed and resulted  Met  -vital signs normal or at patient baseline  Met  Nurse to notify provider when observation goals have been met and patient is ready for discharge.

## 2024-04-13 NOTE — CONSULTS
Care Management Initial Consult    General Information  Assessment completed with: Karina Bonilla  Type of CM/SW Visit: Initial Assessment    Primary Care Provider verified and updated as needed: Yes   Readmission within the last 30 days: no previous admission in last 30 days      Reason for Consult: discharge planning  Advance Care Planning: Advance Care Planning Reviewed: concerns discussed          Communication Assessment  Patient's communication style: spoken language (English or Bilingual)    Hearing Difficulty or Deaf: no        Cognitive  Cognitive/Neuro/Behavioral: WDL                      Living Environment:   People in home: alone     Current living Arrangements: condominium      Able to return to prior arrangements: yes       Family/Social Support:  Care provided by: self, other (see comments)  Provides care for:    Marital Status: Single             Description of Support System:           Current Resources:   Patient receiving home care services: No     Community Resources: None  Equipment currently used at home: none  Supplies currently used at home: None    Employment/Financial:  Employment Status: retired        Financial Concerns: none           Does the patient's insurance plan have a 3 day qualifying hospital stay waiver?  Yes     Which insurance plan 3 day waiver is available? Alternative insurance waiver    Will the waiver be used for post-acute placement? No    Lifestyle & Psychosocial Needs:  Social Determinants of Health     Food Insecurity: Low Risk  (1/31/2024)    Food Insecurity     Within the past 12 months, did you worry that your food would run out before you got money to buy more?: No     Within the past 12 months, did the food you bought just not last and you didn t have money to get more?: No   Depression: Not at risk (1/31/2024)    PHQ-2     PHQ-2 Score: 0   Housing Stability: High Risk (1/31/2024)    Housing Stability     Do you have housing? : No     Are you worried about losing  your housing?: No   Tobacco Use: Medium Risk (4/1/2024)    Patient History     Smoking Tobacco Use: Former     Smokeless Tobacco Use: Never     Passive Exposure: Not on file   Financial Resource Strain: Low Risk  (1/31/2024)    Financial Resource Strain     Within the past 12 months, have you or your family members you live with been unable to get utilities (heat, electricity) when it was really needed?: No   Alcohol Use: Not on file   Transportation Needs: Low Risk  (1/31/2024)    Transportation Needs     Within the past 12 months, has lack of transportation kept you from medical appointments, getting your medicines, non-medical meetings or appointments, work, or from getting things that you need?: No   Physical Activity: Unknown (1/31/2024)    Exercise Vital Sign     Days of Exercise per Week: 3 days     Minutes of Exercise per Session: Not on file   Interpersonal Safety: Not on file   Stress: No Stress Concern Present (1/31/2024)    [employer] - Occupational Stress Questionnaire     Feeling of Stress : Not at all   Social Connections: Unknown (1/31/2024)    Social Connection and Isolation Panel [NHANES]     Frequency of Communication with Friends and Family: Not on file     Frequency of Social Gatherings with Friends and Family: Twice a week     Attends Yarsanism Services: Not on file     Active Member of Clubs or Organizations: Not on file     Attends Club or Organization Meetings: Not on file     Marital Status: Not on file   Health Literacy: Not on file       Functional Status:  Prior to admission patient needed assistance:   Dependent ADLs:: Independent  Dependent IADLs:: Independent       Mental Health Status:  Mental Health Status: No Current Concerns       Chemical Dependency Status:                Values/Beliefs:  Spiritual, Cultural Beliefs, Yarsanism Practices, Values that affect care: no               Additional Information:  -writer met with patient at bedside to review  "Medicare Outpatient Observation Notice.   -Pateint tells writer that she is grateful to get to go home tomorrow after working with PT, \"I did pretty good.\"  Writer inquired as to fi she had enough support. Patient told writer that both of her sister are out of town \"But I live in a condo with a lot of good people willing to help.\"   -writer will send home care referral for home PT as this is recommended by PT.  -patient also would like resources for delivery services for groceries and other help.    Genny Wong RN      "

## 2024-04-13 NOTE — CONSULTS
"Both thoracic and trauma surgery consults placed for same issue.  3 rib fractures with small pneumothorax.  Defer management of this injury to thoracic surgery, thoracic surgery evaluation meets the requirements according to \"a surgeon \"evaluation for these injuries.  "

## 2024-04-14 VITALS
HEART RATE: 65 BPM | RESPIRATION RATE: 18 BRPM | WEIGHT: 114.7 LBS | TEMPERATURE: 97.7 F | DIASTOLIC BLOOD PRESSURE: 55 MMHG | HEIGHT: 61 IN | OXYGEN SATURATION: 93 % | SYSTOLIC BLOOD PRESSURE: 142 MMHG | BODY MASS INDEX: 21.66 KG/M2

## 2024-04-14 PROCEDURE — 250N000013 HC RX MED GY IP 250 OP 250 PS 637: Performed by: INTERNAL MEDICINE

## 2024-04-14 PROCEDURE — 99239 HOSP IP/OBS DSCHRG MGMT >30: CPT | Performed by: INTERNAL MEDICINE

## 2024-04-14 PROCEDURE — 250N000011 HC RX IP 250 OP 636: Performed by: INTERNAL MEDICINE

## 2024-04-14 PROCEDURE — G0378 HOSPITAL OBSERVATION PER HR: HCPCS

## 2024-04-14 RX ORDER — LIDOCAINE 4 G/G
2 PATCH TOPICAL EVERY 24 HOURS
Qty: 30 PATCH | Refills: 1 | Status: SHIPPED | OUTPATIENT
Start: 2024-04-14 | End: 2024-06-11

## 2024-04-14 RX ORDER — ACETAMINOPHEN 500 MG
1000 TABLET ORAL EVERY 8 HOURS
Qty: 120 TABLET | Refills: 0 | Status: SHIPPED | OUTPATIENT
Start: 2024-04-14

## 2024-04-14 RX ORDER — HYDROMORPHONE HYDROCHLORIDE 2 MG/1
2 TABLET ORAL
Qty: 15 TABLET | Refills: 0 | Status: SHIPPED | OUTPATIENT
Start: 2024-04-14 | End: 2024-06-11

## 2024-04-14 RX ADMIN — LOSARTAN POTASSIUM 50 MG: 50 TABLET, FILM COATED ORAL at 09:08

## 2024-04-14 RX ADMIN — LIDOCAINE 2 PATCH: 4 PATCH TOPICAL at 09:08

## 2024-04-14 RX ADMIN — HYDROMORPHONE HYDROCHLORIDE 2 MG: 2 TABLET ORAL at 03:48

## 2024-04-14 RX ADMIN — AMLODIPINE BESYLATE 5 MG: 5 TABLET ORAL at 09:08

## 2024-04-14 RX ADMIN — LABETALOL HYDROCHLORIDE 200 MG: 200 TABLET, FILM COATED ORAL at 09:08

## 2024-04-14 RX ADMIN — LABETALOL HYDROCHLORIDE 200 MG: 200 TABLET, FILM COATED ORAL at 14:43

## 2024-04-14 RX ADMIN — ACETAMINOPHEN 1000 MG: 500 TABLET, FILM COATED ORAL at 12:30

## 2024-04-14 RX ADMIN — HYDROMORPHONE HYDROCHLORIDE 2 MG: 2 TABLET ORAL at 09:08

## 2024-04-14 RX ADMIN — HYDROMORPHONE HYDROCHLORIDE 2 MG: 2 TABLET ORAL at 14:44

## 2024-04-14 RX ADMIN — ACETAMINOPHEN 1000 MG: 500 TABLET, FILM COATED ORAL at 03:48

## 2024-04-14 RX ADMIN — DOCUSATE SODIUM 50 MG AND SENNOSIDES 8.6 MG 2 TABLET: 8.6; 5 TABLET, FILM COATED ORAL at 18:24

## 2024-04-14 RX ADMIN — ONDANSETRON 4 MG: 4 TABLET, ORALLY DISINTEGRATING ORAL at 16:16

## 2024-04-14 RX ADMIN — PROCHLORPERAZINE MALEATE 5 MG: 5 TABLET ORAL at 18:24

## 2024-04-14 ASSESSMENT — ACTIVITIES OF DAILY LIVING (ADL)
ADLS_ACUITY_SCORE: 37
ADLS_ACUITY_SCORE: 37
ADLS_ACUITY_SCORE: 38
ADLS_ACUITY_SCORE: 38
ADLS_ACUITY_SCORE: 42
ADLS_ACUITY_SCORE: 42
ADLS_ACUITY_SCORE: 38
ADLS_ACUITY_SCORE: 37
ADLS_ACUITY_SCORE: 38
ADLS_ACUITY_SCORE: 42
ADLS_ACUITY_SCORE: 37
ADLS_ACUITY_SCORE: 37
ADLS_ACUITY_SCORE: 42
ADLS_ACUITY_SCORE: 37
ADLS_ACUITY_SCORE: 42
ADLS_ACUITY_SCORE: 42
ADLS_ACUITY_SCORE: 37
ADLS_ACUITY_SCORE: 37

## 2024-04-14 NOTE — PLAN OF CARE
Goal Outcome Evaluation:  -diagnostic tests and consults completed and resulted:  Met  -vital signs normal or at patient baseline:Met; pain managed with Tylenol and Oral dilaudid   Nurse to notify provider when observation goals have been met and patient is ready for discharge.

## 2024-04-14 NOTE — PROGRESS NOTES
Patient assisted with getting walker prior to discharge. Physical therapy not available at time of discharge. Walker and paperwork obtained from ED. Patient signed paperwork and we tubed this back to ED per there request. Patient re-educated on discharge instructions and sent home. All belongings sent with patient.

## 2024-04-14 NOTE — PLAN OF CARE
Goal Outcome Evaluation:      Plan of Care Reviewed With: patient    Overall Patient Progress: improvingOverall Patient Progress: improving    Outcome Evaluation: D/C tomorrow w/PT/OT-discharge today    PRIMARY Concern: Fall w/L 6th-8th rib fx & traumatic small pneumothorax  SAFETY RISK Concerns (fall risk, behaviors, etc.): Fall risk       Isolation/Type: NA  Tests/Procedures for NEXT shift: NA  Consults? (Pending/following, signed-off?) all signed off  Where is patient from? (Home, TCU, etc.): Home alone in condo  Other Important info for NEXT shift: Pain increases w/activity, using IS  Anticipated DC date & active delays: today  _____________________________________________________________________________  SUMMARY NOTE:        Orientation/Cognitive: A&Ox4   Observation Goals (Met/ Not Met): Met  Mobility Level/Assist Equipment: Ind  Antibiotics & Plan (IV/po, length of tx left): NA  Pain Management: PRN po dilaudid & scheduled tylenol   Tele/VS/O2: VSS on RA x HTN  ABNL Lab/BG: see results  Diet: Regular  Bowel/Bladder: Continent  Skin Concerns: WDL  Drains/Devices: SL  Patient Stated Goal for Today: Pain control   Observation goals  PRIOR TO DISCHARGE        Comments: -diagnostic tests and consults completed and resulted:  Met  -vital signs normal or at patient baseline:Met  Nurse to notify provider when observation goals have been met and patient is ready for discharge.  Pt to discharge, iv removed, belongings packed, reviewed discharge instructions & follow up cares, pt verbalizes understanding & has no questions. Pt d/c'd via w/c w/belongings & her prescriptions @ [id number]

## 2024-04-14 NOTE — PLAN OF CARE
Goal Outcome Evaluation:  PRIMARY Concern: Fall w/L 6th-8th rib fx & traumatic small pneumothorax  SAFETY RISK Concerns (fall risk, behaviors, etc.): Fall risk       Isolation/Type: NA  Tests/Procedures for NEXT shift: NA  Consults? (Pending/following, signed-off?) all signed off  Where is patient from? (Home, TCU, etc.): Home alone in condo  Other Important info for NEXT shift: Pain increases w/activity,using IS  Anticipated DC date & active delays: Sunday   _____________________________________________________________________________  SUMMARY NOTE:        Orientation/Cognitive: A&Ox4   Observation Goals (Met/ Not Met): Met  Mobility Level/Assist Equipment: SBA  Antibiotics & Plan (IV/po, length of tx left): NA  Pain Management: Managing w/ PRN po dilaudid & scheduled tylenol, ice pack, and heat pack   Tele/VS/O2: VSS on RA ex HTN  ABNL Lab/BG: see results  Diet: Regular  Bowel/Bladder: Continent  Skin Concerns: WDL  Drains/Devices: PIV SL  Patient Stated Goal for Today: Pain control

## 2024-04-14 NOTE — DISCHARGE SUMMARY
[facility]  Hospitalist Discharge Summary      Date of Admission:  4/12/2024  Date of Discharge:  4/14/2024  Discharging Provider: Esperanza Mims MD  Discharge Service: Hospitalist Service    Discharge Diagnoses   Status post possible mechanical fall which patient did not remember  Acute mildly displaced left-sided 6th to eighth rib fractures  Small left-sided pneumothorax which is stable on repeat chest x-ray  Acute left-sided chest pain secondary to above.  Please see hospital course    Clinically Significant Risk Factors          Follow-ups Needed After Discharge   Follow-up Appointments     Follow-up and recommended labs and tests       F/u with PCP in 1-2weeks. Repeat Chest xray in 7-10days           Unresulted Labs Ordered in the Past 30 Days of this Admission       No orders found from 3/13/2024 to 4/13/2024.            Discharge Disposition   Discharged to home with home PT OT ordered  Condition at discharge: Stable    Hospital Course   Karina Lopez is a 83 year old female admitted on 4/12/2024. 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  Postprocedure she went home.  She started having pain in her right eye from the procedure.  She took Tylenol and had a drink of lynda last night and then took 0.25 mg of alprazolam and fell asleep on her couch.  This morning woke up with significant pain in her left side of the chest and tenderness.  In the emergency room her CT scan of the chest showed  Left 6th-8th acute minimally displaced fractures.  Small left-sided pneumothorax        Possible fall prior to admission  Traumatic left-sided acute minimally displaced 6th-8th ribs  Small left-sided pneumothorax  Admitted  to the hospital under observation status  Pain control with scheduled Tylenol 1000 mg every 8 hours, lidocaine patches 2 patches to apply to the left chest of the area of pain, Dilaudid 2 mg every 3 hours as needed for pain  And Dilaudid IV for breakthrough pain  Stop alprazolam as this might have contributed to her sleepwalking and resulting in fall which she did not remember the night prior to presentation     Thoracic surgery consulted  Repeat chest x-ray showed stable pneumothorax  Outpatient follow-up with PCP in 7 to 10 days with repeat chest x-ray recommended per thoracic surgery     Patient's pain is reasonably well-controlled on the current regimen     History of traumatic ascending aortic aneurysm status postrepair  Hypertension  Hyperlipidemia;  Continue  PTA multiple blood pressure meds with hold parameters  Hold statin for now while being admitted as absorbs status     History of atrophic right kidney;  Mild hyponatremia  Sodium at 131   Gentle hydration normal saline at 100 mL/h stopped  after 1 L  Sodium improved to 138     History of insomnia;  Stop alprazolam  Low-dose melatonin 1mg as needed might be better for her        Diet:  Regular diet  DVT Prophylaxis: Ambulate every shift  Alarcon Catheter: Not present  Lines: None     Cardiac Monitoring: None  Code Status:  Discussed with the patient she wants to be full code.  Want to be on long-term life support if she is in chronic vegetative state        Clinically Significant Risk Factors Present on Admission                  # Hypertension: Noted on problem list                        Disposition Plan     Medically Ready for Discharge; to home with home PT/OT ordered     Consultations This Hospital Stay   TRAUMA SURGERY IP CONSULT  THORACIC SURGERY IP CONSULT  PHYSICAL THERAPY ADULT IP CONSULT  CARE MANAGEMENT / SOCIAL WORK IP CONSULT  CARE MANAGEMENT / SOCIAL WORK IP CONSULT    Code Status   Full Code    Time Spent on this Encounter   Esperanza CLEVELAND  MD, personally saw the patient today and spent greater than 30 minutes discharging this patient.       Esperanza Mims MD  [facility] EXTENDED RECOVERY AND SHORT STAY  [address] [address]  Phone: [phone]  ______________________________________________________________________    Physical Exam   Vital Signs: Temp: 98.2  F (36.8  C) Temp src: Oral BP: (!) 157/67 Pulse: 65   Resp: 18 SpO2: 91 % O2 Device: None (Room air)    Weight: 114 lbs 11.2 oz  General Appearance: Alert, awake, NAD   Respiratory: CTA b/l   Cardiovascular: RRR  GI: soft, NT, ND, BS+  Skin: warm and dry   Other:         Primary Care Physician   Smitha Bowen    Discharge Orders      Home Care Referral      Reason for your hospital stay    S/p fall with Left sided 6th to 8th rib fractures and small Pneumothorax. Stable on repeat chest xray     Follow-up and recommended labs and tests     F/u with PCP in 1-2weeks. Repeat Chest xray in 7-10days     Activity    Your activity upon discharge: activity as tolerated     Walker Order for DME - ONLY FOR DME     Diet    Follow this diet upon discharge: Orders Placed This Encounter      Regular Diet Adult       Significant Results and Procedures   Most Recent 3 CBC's:  Recent Labs   Lab Test 04/13/24  0649 04/12/24  0747 01/31/24  1138 01/13/23  0220   WBC 5.3 7.3  --  7.9   HGB 11.4* 12.7 13.3 12.7   MCV 93 91  --  94    196  --  235     Most Recent 3 BMP's:  Recent Labs   Lab Test 04/13/24  0649 04/12/24  0747 01/31/24  1138    131* 136   POTASSIUM 4.0 4.4 5.0   CHLORIDE 104 93* 100   CO2 23 26 27   BUN 15.5 20.2 19.0   CR 0.79 0.95 0.96*   ANIONGAP 11 12 9   ALEXI 8.7* 9.0 9.7   GLC 91 109* 102*     Most Recent 2 LFT's:  Recent Labs   Lab Test 04/12/24  0747 01/30/23  1052   AST 18 25   ALT 19 24   ALKPHOS 67 68   BILITOTAL 1.6* 1.2     Most Recent 3 INR's:No lab results found.  Most Recent INR's and Anticoagulation Dosing History:  Anticoagulation Dose  History          Latest Ref Rng & Units 2/18/2015   Recent Dosing and Labs   INR 0.86 - 1.14 0.92      Most Recent 3 Creatinines:  Recent Labs   Lab Test 04/13/24  0649 04/12/24  0747 01/31/24  1138   CR 0.79 0.95 0.96*     Most Recent 3 Hemoglobins:  Recent Labs   Lab Test 04/13/24  0649 04/12/24  0747 01/31/24  1138   HGB 11.4* 12.7 13.3     Most Recent 3 Troponin's:No lab results found.  Most Recent 3 BNP's:No lab results found.  Most Recent D-dimer:No lab results found.  Most Recent Cholesterol Panel:  Recent Labs   Lab Test 01/31/24  1138   CHOL 188   LDL 80   HDL 95   TRIG 64     7-Day Micro Results       No results found for the last 168 hours.          Most Recent TSH and T4:  Recent Labs   Lab Test 09/30/22  0810   TSH 3.25     Most Recent Hemoglobin A1c:No lab results found.  Most Recent 6 glucoses:  Recent Labs   Lab Test 04/13/24  0649 04/12/24  0747 01/31/24  1138 01/30/23  1052 01/13/23  0220 09/30/22  0810   GLC 91 109* 102* 116* 101* 102*     Most Recent Urinalysis:  Recent Labs   Lab Test 04/13/16  1249   COLOR Yellow   APPEARANCE Clear   URINEGLC Negative   URINEBILI Negative   URINEKETONE Negative   SG 1.025   UBLD Negative   URINEPH 7.0   PROTEIN Trace*   UROBILINOGEN 0.2   NITRITE Negative   LEUKEST Negative   RBCU O - 2   WBCU O - 2     Most Recent ABG:No lab results found.  Most Recent ESR & CRP:No lab results found.  Most Recent Anemia Panel:  Recent Labs   Lab Test 04/13/24  0649 09/30/22  0810 01/25/22  1154   WBC 5.3   < > 6.3   HGB 11.4*   < > 13.5   HCT 34.6*   < > 41.2   MCV 93   < > 96      < > 234   B12  --   --  479    < > = values in this interval not displayed.     Most Recent CPK:No lab results found.,   Results for orders placed or performed during the hospital encounter of 04/12/24   CT Aortic Survey w Contrast    Narrative    CT AORTIC SURVEY WITH CONTRAST 4/12/2024 9:44 AM    CLINICAL HISTORY: History of Vasile type A dissection and repair,  left chest and back pain.  Evaluate the surgical repair for any  leaking.    TECHNIQUE: Noncontrast chest CT followed by CT angiogram chest abdomen  pelvis with IV contrast. Arterial phase through the chest, abdomen and  pelvis. 2D and 3D MIP reconstructions were preformed by the CT  technologist. Dose reduction techniques were used.     CONTRAST: 72mL [id number]    COMPARISON: 1/13/2023    FINDINGS:  ANGIOGRAM: Postop graft repair of the ascending aorta. Stable  thrombosed dissection involving the aortic arch, descending thoracic  aorta, and abdominal aorta. No new dissection. No leak. Great vessels  in the mediastinum are widely patent. Mild stenosis at the origin of  the celiac artery. There is a severe stenosis at the origin of the  left renal artery, similar to previous.    LUNGS AND PLEURA: Small left pneumothorax. Atelectasis left lung base.    MEDIASTINUM/AXILLAE: Postop changes as above. No hematoma or  lymphadenopathy. Mild coronary artery calcification.    HEPATOBILIARY: Normal.    PANCREAS: Normal.    SPLEEN: Normal.    ADRENAL GLANDS: Normal.    KIDNEYS/BLADDER: Stable left renal atrophy.    BOWEL: Colonic diverticulosis without signs of diverticulitis.    LYMPH NODES: Normal.    PELVIC ORGANS: Normal.    OTHER: None.    MUSCULOSKELETAL: Acute minimally displaced fractures of the  anterolateral left sixth, seventh, and eighth ribs.      Impression    IMPRESSION:  1.  Small left pneumothorax.  2.  Acute minimally displaced fractures of the left sixth through  eighth ribs.  3.  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    KERON BEASLEY MD         SYSTEM ID:  [id number]   CT Head w/o Contrast    Narrative    CT OF THE HEAD WITHOUT CONTRAST 4/12/2024 1:00 PM     COMPARISON: Brain MR 2/18/2015.    HISTORY: Fall. Trauma. Pain.    TECHNIQUE: 5 mm thick axial CT images  of the head were acquired  without IV contrast material.    FINDINGS: There is mild diffuse cerebral volume loss. There are subtle  patchy areas of decreased density in the cerebral white matter  bilaterally that are consistent with sequela of chronic small vessel  ischemic disease.    The ventricles and basal cisterns are within normal limits in  configuration given the degree of cerebral volume loss.  There is no  midline shift. There are no extra-axial fluid collections.    No intracranial hemorrhage, mass or recent infarct.    The visualized paranasal sinuses are well-aerated. There is no  mastoiditis. There are no fractures of the visualized bones.      Impression    IMPRESSION: Diffuse cerebral volume loss and cerebral white matter  changes consistent with chronic small vessel ischemic disease. No  evidence for acute intracranial pathology.    Radiation dose for this scan was reduced using automated exposure  control, adjustment of the mA and/or kV according to patient size, or  iterative reconstruction technique.     MIROSLAVA DEAL MD         SYSTEM ID:  [id number]   XR Chest Port 1 View    Narrative    EXAM: XR CHEST PORT 1 VIEW  LOCATION: [facility]  DATE: 4/13/2024    INDICATION: pneumothorax  COMPARISON: CTA CAP 04/12/2024      Impression    IMPRESSION: Small left apical pneumothorax again noted measuring 1.1 cm. Left lower lobe atelectasis and trace effusion is unchanged.    Poststernotomy changes with right infraclavicular clips. Left lung is clear. Heart and pulmonary vascularity are normal. S-shaped thoracolumbar scoliosis.       Discharge Medications   Current Discharge Medication List        START taking these medications    Details   acetaminophen (TYLENOL) 500 MG tablet Take 2 tablets (1,000 mg) by mouth every 8 hours  Qty: 120 tablet, Refills: 0    Associated Diagnoses: Closed fracture of multiple ribs of left side, sequela      HYDROmorphone (DILAUDID) 2 MG tablet Take 1  tablet (2 mg) by mouth every 3 hours as needed for moderate pain  Qty: 15 tablet, Refills: 0    Associated Diagnoses: Closed fracture of multiple ribs of left side, sequela      Lidocaine (LIDOCARE) 4 % Patch Place 2 patches onto the skin every 24 hours To prevent lidocaine toxicity, patient should be patch free for 12 hrs daily.  Qty: 30 patch, Refills: 1    Associated Diagnoses: Closed fracture of multiple ribs of left side, sequela           CONTINUE these medications which have NOT CHANGED    Details   amLODIPine (NORVASC) 5 MG tablet TAKE 1 TABLET(5 MG) BY MOUTH DAILY  Qty: 90 tablet, Refills: 3    Associated Diagnoses: HTN (hypertension), benign      Ascorbic Acid (VITAMIN C PO) Take 500 mg by mouth daily      calcium carbonate (OS-ALEXI 500 MG [place]. [place]) 500 MG tablet Take 500 mg by mouth daily      cholecalciferol (VITAMIN D3) 25 mcg (1000 units) capsule Take 1 capsule by mouth daily.      labetalol (NORMODYNE) 200 MG tablet Take 1 tablet (200 mg) by mouth 3 times daily  Qty: 270 tablet, Refills: 1    Associated Diagnoses: HTN (hypertension), benign      losartan (COZAAR) 50 MG tablet Take 1 tablet (50 mg) by mouth daily  Qty: 90 tablet, Refills: 3    Associated Diagnoses: HTN (hypertension), benign      multivitamin w/minerals (THERA-VIT-M) tablet Take 1 tablet by mouth daily      rosuvastatin (CRESTOR) 5 MG tablet Take 1 tablet (5 mg) by mouth daily  Qty: 90 tablet, Refills: 3    Associated Diagnoses: Hyperlipidemia LDL goal <100      traZODone (DESYREL) 50 MG tablet TAKE 1 TABLET(50 MG) BY MOUTH EVERY EVENING AS NEEDED FOR SLEEP  Qty: 90 tablet, Refills: 1    Associated Diagnoses: Primary insomnia           STOP taking these medications       ALPRAZolam (XANAX) 0.25 MG tablet Comments:   Reason for Stopping:             Allergies   Allergies   Allergen Reactions    Lisinopril      Cough      Simvastatin      myalgias

## 2024-04-14 NOTE — PROGRESS NOTES
Observation goals  PRIOR TO DISCHARGE        Comments: -diagnostic tests and consults completed and resulted:  Met  -vital signs normal or at patient baseline:Met  Nurse to notify provider when observation goals have been met and patient is ready for discharge.

## 2024-04-14 NOTE — PROGRESS NOTES
Care Management Discharge Note    Discharge Date: 04/14/2024       Discharge Disposition: Home, Home Care    Discharge Services: None    Discharge DME: None    Discharge Transportation: family or friend will provide    Private pay costs discussed: Not applicable    Does the patient's insurance plan have a 3 day qualifying hospital stay waiver?  Yes     Which insurance plan 3 day waiver is available? Alternative insurance waiver    Will the waiver be used for post-acute placement? No    PAS Confirmation Code:    Patient/family educated on Medicare website which has current facility and service quality ratings:      Education Provided on the Discharge Plan:    Persons Notified of Discharge Plans: Bedside nurse  Patient/Family in Agreement with the Plan: yes    Handoff Referral Completed: No    Additional Information:  Patient discharging home today.    She has an appointment with primary clinic with another provider on May 1.  It is recommended she be seen withgin 10 days for a CXR.   The prior CM note states they did speak with patient about calling on Monday to see if she could get in earlier.  Writer has added to the AVS to have the patient call the Geriatrics line phone number which is where her provider Smitha Bowen is at which is [phone].    Home care accepting agency is [facility].  Writer informed them via phone of patient's discharge and faxed over the orders.    Kimberlee Renteria RN  Inpatient Float Care Coordinator  [facility]

## 2024-04-15 ENCOUNTER — PATIENT OUTREACH (OUTPATIENT)
Dept: FAMILY MEDICINE | Facility: CLINIC | Age: 84
End: 2024-04-15
Payer: COMMERCIAL

## 2024-04-15 NOTE — TELEPHONE ENCOUNTER
What type of discharge? Observation  Risk of Hospital admission or ED visit: 65.3%  Is a TCM episode required? Yes  When should the patient follow up with PCP? 7 days of discharge.    Paz Alvarez RN  [facility]

## 2024-04-17 ENCOUNTER — MEDICAL CORRESPONDENCE (OUTPATIENT)
Dept: HEALTH INFORMATION MANAGEMENT | Facility: CLINIC | Age: 84
End: 2024-04-17

## 2024-04-17 ENCOUNTER — TELEPHONE (OUTPATIENT)
Dept: FAMILY MEDICINE | Facility: CLINIC | Age: 84
End: 2024-04-17
Payer: COMMERCIAL

## 2024-04-17 NOTE — TELEPHONE ENCOUNTER
Home Care is calling regarding an established patient with [facility].       Requesting orders from: Smitha Bowen  Provider is following patient: No       Orders Requested    Physical Therapy  Request for initial certification (first set of orders)   Frequency:  2x/wk for 4 wks, then 1x/week for 4 weeks effective on 4/22/24  Working on gait strength and balance after recent fall     Information was gathered and will be sent to provider for review.  RN will contact Home Care with information after provider review.  Confirmed ok to leave a detailed message with call back.  Contact information confirmed and updated as needed.    Jennifer Roman, RN

## 2024-04-29 DIAGNOSIS — Z53.9 DIAGNOSIS NOT YET DEFINED: Primary | ICD-10-CM

## 2024-04-29 PROCEDURE — G0180 MD CERTIFICATION HHA PATIENT: HCPCS

## 2024-05-01 ENCOUNTER — OFFICE VISIT (OUTPATIENT)
Dept: FAMILY MEDICINE | Facility: CLINIC | Age: 84
End: 2024-05-01
Payer: COMMERCIAL

## 2024-05-01 ENCOUNTER — ANCILLARY PROCEDURE (OUTPATIENT)
Dept: GENERAL RADIOLOGY | Facility: CLINIC | Age: 84
End: 2024-05-01
Attending: INTERNAL MEDICINE
Payer: COMMERCIAL

## 2024-05-01 VITALS
RESPIRATION RATE: 16 BRPM | HEART RATE: 62 BPM | SYSTOLIC BLOOD PRESSURE: 95 MMHG | BODY MASS INDEX: 21.34 KG/M2 | DIASTOLIC BLOOD PRESSURE: 54 MMHG | TEMPERATURE: 97.5 F | HEIGHT: 61 IN | OXYGEN SATURATION: 93 % | WEIGHT: 113 LBS

## 2024-05-01 DIAGNOSIS — S22.42XA CLOSED FRACTURE OF MULTIPLE RIBS OF LEFT SIDE, INITIAL ENCOUNTER: ICD-10-CM

## 2024-05-01 DIAGNOSIS — F51.3 SLEEP WALKING: ICD-10-CM

## 2024-05-01 DIAGNOSIS — S27.0XXA TRAUMATIC PNEUMOTHORAX, INITIAL ENCOUNTER: Primary | ICD-10-CM

## 2024-05-01 DIAGNOSIS — S27.0XXA TRAUMATIC PNEUMOTHORAX, INITIAL ENCOUNTER: ICD-10-CM

## 2024-05-01 DIAGNOSIS — F51.01 PRIMARY INSOMNIA: ICD-10-CM

## 2024-05-01 DIAGNOSIS — I10 HTN (HYPERTENSION), BENIGN: ICD-10-CM

## 2024-05-01 PROCEDURE — 99213 OFFICE O/P EST LOW 20 MIN: CPT | Performed by: INTERNAL MEDICINE

## 2024-05-01 PROCEDURE — 71046 X-RAY EXAM CHEST 2 VIEWS: CPT | Mod: TC | Performed by: RADIOLOGY

## 2024-05-01 ASSESSMENT — PATIENT HEALTH QUESTIONNAIRE - PHQ9: SUM OF ALL RESPONSES TO PHQ QUESTIONS 1-9: 0

## 2024-05-01 ASSESSMENT — PAIN SCALES - GENERAL: PAINLEVEL: NO PAIN (0)

## 2024-05-01 NOTE — PROGRESS NOTES
Channing Collins is a 83 year old, presenting for the following health issues:    HPI       Hospital Follow-up Visit:    Hospital/Nursing Home/IP Rehab Facility: [facility]  Date of Admission: 04/12/2024  Date of Discharge: 04/14/2024  Reason(s) for Admission: Status post possible mechanical fall which patient did not remember  Acute mildly displaced left-sided 6th to eighth rib fractures  Small left-sided pneumothorax which is stable on repeat chest x-ray  Acute left-sided chest pain secondary to above.  Please see hospital course  Was the patient in the ICU or did the patient experience delirium during hospitalization?  No  Do you have any other stressors you would like to discuss with your provider? No    Problems taking medications regularly:  None  Medication changes since discharge: None  Problems adhering to non-medication therapy:  None    Summary of hospitalization:  [facility] discharge summary reviewed  Diagnostic Tests/Treatments reviewed.  Follow up needed: none  Other Healthcare Providers Involved in Patient s Care:         Homecare  Update since discharge: improved.     Plan of care communicated with patient     Chief Complaint:     Post hospital discharge follow up of fall related rib fractures, pneumothorax    HPI:   Patient Karina Lopez is a very pleasant 83 year old female with history of hypertension who presents to Internal Medicine clinic today for Post hospital discharge follow up of fall related rib fractures, pneumothorax. No recent new falls since hospital discharge.       Current Medications:     Current Outpatient Medications   Medication Sig Dispense Refill    acetaminophen (TYLENOL) 500 MG tablet Take 2 tablets (1,000 mg) by mouth every 8 hours 120 tablet 0    amLODIPine (NORVASC) 5 MG tablet TAKE 1 TABLET(5 MG) BY MOUTH DAILY 90 tablet 3    Ascorbic Acid (VITAMIN C PO) Take 500 mg by mouth daily      calcium carbonate (OS-ALEXI 500 MG  [place]. [place]) 500 MG tablet Take 500 mg by mouth daily      cholecalciferol (VITAMIN D3) 25 mcg (1000 units) capsule Take 1 capsule by mouth daily.      HYDROmorphone (DILAUDID) 2 MG tablet Take 1 tablet (2 mg) by mouth every 3 hours as needed for moderate pain 15 tablet 0    labetalol (NORMODYNE) 200 MG tablet Take 1 tablet (200 mg) by mouth 3 times daily 270 tablet 1    Lidocaine (LIDOCARE) 4 % Patch Place 2 patches onto the skin every 24 hours To prevent lidocaine toxicity, patient should be patch free for 12 hrs daily. 30 patch 1    losartan (COZAAR) 50 MG tablet Take 1 tablet (50 mg) by mouth daily 90 tablet 3    multivitamin w/minerals (THERA-VIT-M) tablet Take 1 tablet by mouth daily      rosuvastatin (CRESTOR) 5 MG tablet Take 1 tablet (5 mg) by mouth daily 90 tablet 3    traZODone (DESYREL) 50 MG tablet TAKE 1 TABLET(50 MG) BY MOUTH EVERY EVENING AS NEEDED FOR SLEEP 90 tablet 1         Allergies:      Allergies   Allergen Reactions    Lisinopril      Cough      Simvastatin      myalgias            Past Medical History:     Past Medical History:   Diagnosis Date    Anemia     Aortic valve insufficiency, etiology of cardiac valve disease unspecified 04/13/2016    Best vitelliform macular dystrophy     CKD (chronic kidney disease) stage 3, GFR 30-59 ml/min (H) 12/09/2015    Former smoker     HTN (hypertension), benign     Hx of repair of dissecting thoracic aortic aneurysm, [place] type A     +fibromuscular dysplasia    Hyperlipidemia LDL goal < 130     Insomnia     ASH (obstructive sleep apnea)(mild AHI=14) 04/12/2015    Osteoporosis, postmenopausal          Past Surgical History:     Past Surgical History:   Procedure Laterality Date    CARDIAC SURGERY      HC OPEN TX METATARSAL FRACTURE      S/p thoracic aortic aneurysm repair  10/21/12    VASCULAR SURGERY           Family Medical History:     Family History   Problem Relation Age of Onset    Hypertension Father         best syndrome    Cancer Father          lung    Hypertension Paternal Grandmother         stroke    Alcohol/Drug Brother         best syndr         Social History:     Social History     Socioeconomic History    Marital status: Single     Spouse name: Not on file    Number of children: Not on file    Years of education: Not on file    Highest education level: Not on file   Occupational History    Not on file   Tobacco Use    Smoking status: Former     Current packs/day: 0.00     Types: Cigarettes     Quit date: 1974     Years since quittin.3    Smokeless tobacco: Never    Tobacco comments:     light smoker    Vaping Use    Vaping status: Never Used   Substance and Sexual Activity    Alcohol use: Yes     Alcohol/week: 3.0 standard drinks of alcohol     Types: 3 Glasses of wine per week     Comment: I-2 drinks with dinner    Drug use: No    Sexual activity: Yes     Partners: Male   Other Topics Concern    Parent/sibling w/ CABG, MI or angioplasty before 65F 55M? No     Service Not Asked    Blood Transfusions Not Asked    Caffeine Concern Yes     Comment: 2 cups  a day    Occupational Exposure Not Asked    Hobby Hazards Not Asked    Sleep Concern Not Asked    Stress Concern Not Asked    Weight Concern Not Asked    Special Diet Yes    Back Care Not Asked    Exercise Yes     Comment: go to the [employer]    Bike Helmet Not Asked    Seat Belt Not Asked    Self-Exams Not Asked   Social History Narrative    Working as a  PT.     Retired 2014.     Social Determinants of Health     Financial Resource Strain: Low Risk  (2024)    Financial Resource Strain     Within the past 12 months, have you or your family members you live with been unable to get utilities (heat, electricity) when it was really needed?: No   Food Insecurity: Low Risk  (2024)    Food Insecurity     Within the past 12 months, did you worry that your food would run out before you got money to buy more?: No     Within the past 12 months, did the food you bought  just not last and you didn t have money to get more?: No   Transportation Needs: Low Risk  (1/31/2024)    Transportation Needs     Within the past 12 months, has lack of transportation kept you from medical appointments, getting your medicines, non-medical meetings or appointments, work, or from getting things that you need?: No   Physical Activity: Unknown (1/31/2024)    Exercise Vital Sign     Days of Exercise per Week: 3 days     Minutes of Exercise per Session: Not on file   Stress: No Stress Concern Present (1/31/2024)    [employer] - Occupational Stress Questionnaire     Feeling of Stress : Not at all   Social Connections: Unknown (1/31/2024)    Social Connection and Isolation Panel [NHANES]     Frequency of Communication with Friends and Family: Not on file     Frequency of Social Gatherings with Friends and Family: Twice a week     Attends Temple Services: Not on file     Active Member of Clubs or Organizations: Not on file     Attends Club or Organization Meetings: Not on file     Marital Status: Not on file   Interpersonal Safety: Not on file   Housing Stability: High Risk (1/31/2024)    Housing Stability     Do you have housing? : No     Are you worried about losing your housing?: No           Review of System:     Constitutional: Negative for fever or chills  Skin: Negative for rashes  Ears/Nose/Throat: Negative for nasal congestion, sore throat  Respiratory: No shortness of breath, dyspnea on exertion, cough, or hemoptysis  Cardiovascular: Negative for chest pain  Gastrointestinal: Negative for nausea, vomiting  Genitourinary: Negative for dysuria, hematuria  Musculoskeletal: positive for fall related rib fractures, pneumothorax.  Neurologic: Negative for headaches  Psychiatric: Negative for depression, anxiety  Hematologic/Lymphatic/Immunologic: Negative  Endocrine: Negative  Behavioral: Negative for tobacco use       Physical Exam:   BP 95/54 (BP Location: Right arm,  "Patient Position: Sitting, Cuff Size: Adult Regular)   Pulse 62   Temp 97.5  F (36.4  C) (Temporal)   Resp 16   Ht 1.549 m (5' 1\")   Wt 51.3 kg (113 lb)   SpO2 93%   BMI 21.35 kg/m      Physical Exam   GENERAL: alert and no distress  EYES: Eyes grossly normal to inspection, conjunctivae and sclerae normal  HENT: normocephalic atraumatic  NECK: no adenopathy, no asymmetry, masses, or scars  RESP: lungs clear to auscultation   CV: regular rate and rhythm, normal S1 S2  ABDOMEN: soft, nontender, bowel sounds normal  MS: no gross musculoskeletal defects noted, no edema  SKIN: no suspicious lesions or rashes  NEURO: Normal strength and tone, mentation intact and speech normal  PSYCH: mentation appears normal, affect normal/bright      Diagnostic Test Results:     Diagnostic Test Results:  Labs reviewed in Epic    ASSESSMENT/PLAN:       Karina was seen today for hospital f/u.    Diagnoses and all orders for this visit:    Traumatic pneumothorax, initial encounter  -     XR Chest 2 Views; Future  - resolved on CXR    Closed fracture of multiple ribs of left side, initial encounter  -     XR Chest 2 Views; Future  - continue current therapy including low dose OTC pain medications  - continue home healthcare including PT    Primary insomnia  Sleep walking  - stable, continue current Trazodone therapy    Hypertension   - stable, BP is at goal  - continue current therapy          Follow Up Plan:     Patient is instructed to return to Internal Medicine clinic for follow-up visit in 1 month.        Sunita Siddiqui MD  Internal Medicine  [facility]        Signed Electronically by: Sunita Siddiqui MD    "

## 2024-05-20 ENCOUNTER — APPOINTMENT (OUTPATIENT)
Dept: GENERAL RADIOLOGY | Facility: CLINIC | Age: 84
End: 2024-05-20
Payer: COMMERCIAL

## 2024-05-20 ENCOUNTER — HOSPITAL ENCOUNTER (EMERGENCY)
Facility: CLINIC | Age: 84
Discharge: HOME | End: 2024-05-20
Attending: EMERGENCY MEDICINE | Admitting: EMERGENCY MEDICINE
Payer: COMMERCIAL

## 2024-05-20 ENCOUNTER — NURSE TRIAGE (OUTPATIENT)
Dept: NURSING | Facility: CLINIC | Age: 84
End: 2024-05-20
Payer: COMMERCIAL

## 2024-05-20 VITALS
HEIGHT: 61 IN | SYSTOLIC BLOOD PRESSURE: 171 MMHG | RESPIRATION RATE: 10 BRPM | TEMPERATURE: 97 F | BODY MASS INDEX: 20.77 KG/M2 | HEART RATE: 60 BPM | OXYGEN SATURATION: 94 % | DIASTOLIC BLOOD PRESSURE: 66 MMHG | WEIGHT: 110 LBS

## 2024-05-20 DIAGNOSIS — S52.502A CLOSED FRACTURE OF DISTAL END OF LEFT RADIUS, UNSPECIFIED FRACTURE MORPHOLOGY, INITIAL ENCOUNTER: ICD-10-CM

## 2024-05-20 LAB
ALBUMIN SERPL BCG-MCNC: 4.1 G/DL (ref 3.5–5.2)
ALP SERPL-CCNC: 66 U/L (ref 40–150)
ALT SERPL W P-5'-P-CCNC: 16 U/L (ref 0–50)
ANION GAP SERPL CALCULATED.3IONS-SCNC: 13 MMOL/L (ref 7–15)
AST SERPL W P-5'-P-CCNC: 13 U/L (ref 0–45)
ATRIAL RATE - MUSE: 57 BPM
BASOPHILS # BLD AUTO: 0 10E3/UL (ref 0–0.2)
BASOPHILS NFR BLD AUTO: 0 %
BILIRUB SERPL-MCNC: 1.3 MG/DL
BUN SERPL-MCNC: 17.5 MG/DL (ref 8–23)
CALCIUM SERPL-MCNC: 9.6 MG/DL (ref 8.8–10.2)
CHLORIDE SERPL-SCNC: 100 MMOL/L (ref 98–107)
CREAT SERPL-MCNC: 1.01 MG/DL (ref 0.51–0.95)
DEPRECATED HCO3 PLAS-SCNC: 24 MMOL/L (ref 22–29)
DIASTOLIC BLOOD PRESSURE - MUSE: NORMAL MMHG
EGFRCR SERPLBLD CKD-EPI 2021: 56 ML/MIN/1.73M2
EOSINOPHIL # BLD AUTO: 0.3 10E3/UL (ref 0–0.7)
EOSINOPHIL NFR BLD AUTO: 4 %
ERYTHROCYTE [DISTWIDTH] IN BLOOD BY AUTOMATED COUNT: 13.6 % (ref 10–15)
GLUCOSE SERPL-MCNC: 110 MG/DL (ref 70–99)
HCT VFR BLD AUTO: 35.2 % (ref 35–47)
HGB BLD-MCNC: 11.8 G/DL (ref 11.7–15.7)
HOLD SPECIMEN: NORMAL
IMM GRANULOCYTES # BLD: 0 10E3/UL
IMM GRANULOCYTES NFR BLD: 0 %
INTERPRETATION ECG - MUSE: NORMAL
LYMPHOCYTES # BLD AUTO: 0.9 10E3/UL (ref 0.8–5.3)
LYMPHOCYTES NFR BLD AUTO: 12 %
MCH RBC QN AUTO: 31.2 PG (ref 26.5–33)
MCHC RBC AUTO-ENTMCNC: 33.5 G/DL (ref 31.5–36.5)
MCV RBC AUTO: 93 FL (ref 78–100)
MONOCYTES # BLD AUTO: 0.7 10E3/UL (ref 0–1.3)
MONOCYTES NFR BLD AUTO: 9 %
NEUTROPHILS # BLD AUTO: 5.7 10E3/UL (ref 1.6–8.3)
NEUTROPHILS NFR BLD AUTO: 75 %
NRBC # BLD AUTO: 0 10E3/UL
NRBC BLD AUTO-RTO: 0 /100
P AXIS - MUSE: 62 DEGREES
PLATELET # BLD AUTO: 187 10E3/UL (ref 150–450)
POTASSIUM SERPL-SCNC: 4.6 MMOL/L (ref 3.4–5.3)
PR INTERVAL - MUSE: 146 MS
PROT SERPL-MCNC: 6.5 G/DL (ref 6.4–8.3)
QRS DURATION - MUSE: 80 MS
QT - MUSE: 480 MS
QTC - MUSE: 467 MS
R AXIS - MUSE: 63 DEGREES
RBC # BLD AUTO: 3.78 10E6/UL (ref 3.8–5.2)
SODIUM SERPL-SCNC: 137 MMOL/L (ref 135–145)
SYSTOLIC BLOOD PRESSURE - MUSE: NORMAL MMHG
T AXIS - MUSE: 78 DEGREES
VENTRICULAR RATE- MUSE: 57 BPM
WBC # BLD AUTO: 7.7 10E3/UL (ref 4–11)

## 2024-05-20 PROCEDURE — 73110 X-RAY EXAM OF WRIST: CPT | Mod: LT

## 2024-05-20 PROCEDURE — 93005 ELECTROCARDIOGRAM TRACING: CPT

## 2024-05-20 PROCEDURE — 85025 COMPLETE CBC W/AUTO DIFF WBC: CPT | Performed by: EMERGENCY MEDICINE

## 2024-05-20 PROCEDURE — 29125 APPL SHORT ARM SPLINT STATIC: CPT | Mod: LT

## 2024-05-20 PROCEDURE — 99285 EMERGENCY DEPT VISIT HI MDM: CPT | Mod: 25

## 2024-05-20 PROCEDURE — 36415 COLL VENOUS BLD VENIPUNCTURE: CPT | Performed by: EMERGENCY MEDICINE

## 2024-05-20 PROCEDURE — 82040 ASSAY OF SERUM ALBUMIN: CPT | Performed by: EMERGENCY MEDICINE

## 2024-05-20 ASSESSMENT — ACTIVITIES OF DAILY LIVING (ADL)
ADLS_ACUITY_SCORE: 39

## 2024-05-20 NOTE — ED PROVIDER NOTES
Emergency Department Note      History of Present Illness     Chief Complaint  Wrist Pain    HPI  Karina Lopez is a 83 year old female with a past medical history of thoracic aortic dissection, CKD, osteoporosis who presents to the emergency department today for evaluation of left wrist pain.  Last night the patient took trazodone to help her sleep.  She then stood up from her chair and felt dizzy and fell over.  Patient fell backwards onto her outstretched left hand.  Denies hitting her head or any other injuries.  The patient was able to get up off the floor by herself.  Denies loss of consciousness.  Denies chest pain, shortness of breath, headache, vision changes.  States that she has not felt dizzy since this.  She ran her hand under cold water and wrapped it in a cold washcloth.  This morning she took Tylenol which somewhat helped her pain.  Endorses tingling in the hand.  She is not on blood thinners.    Independent Historian  None    Review of External Notes  Hospital admission 4/12/2024: fall resulting in left sided 6-8 rib fractures and left sided pneumothorax. Fall thought to be due to starting aprazolam along with having a glass of lynda.     Past Medical History   Medical History and Problem List  Past Medical History:   Diagnosis Date    Anemia     Aortic valve insufficiency, etiology of cardiac valve disease unspecified 04/13/2016    Best vitelliform macular dystrophy     CKD (chronic kidney disease) stage 3, GFR 30-59 ml/min (H) 12/09/2015    Former smoker     HTN (hypertension), benign     Hx of repair of dissecting thoracic aortic aneurysm, [place] type A     Hyperlipidemia LDL goal < 130     Insomnia     ASH (obstructive sleep apnea)(mild AHI=14) 04/12/2015    Osteoporosis, postmenopausal        Medications  acetaminophen (TYLENOL) 500 MG tablet  amLODIPine (NORVASC) 5 MG tablet  Ascorbic Acid (VITAMIN C PO)  calcium carbonate (OS-ALEXI 500 MG [place]. [place]) 500 MG tablet  cholecalciferol  "(VITAMIN D3) 25 mcg (1000 units) capsule  HYDROmorphone (DILAUDID) 2 MG tablet  labetalol (NORMODYNE) 200 MG tablet  Lidocaine (LIDOCARE) 4 % Patch  losartan (COZAAR) 50 MG tablet  multivitamin w/minerals (THERA-VIT-M) tablet  rosuvastatin (CRESTOR) 5 MG tablet  traZODone (DESYREL) 50 MG tablet        Surgical History   Past Surgical History:   Procedure Laterality Date    CARDIAC SURGERY      HC OPEN TX METATARSAL FRACTURE      S/p thoracic aortic aneurysm repair  10/21/12    VASCULAR SURGERY       Physical Exam   Patient Vitals for the past 24 hrs:   BP Temp Temp src Pulse Resp SpO2 Height Weight   05/20/24 1200 (!) 171/66 -- -- 60 10 -- -- --   05/20/24 1145 -- -- -- 61 11 94 % -- --   05/20/24 1100 (!) 161/59 -- -- 53 16 94 % -- --   05/20/24 1027 (!) 165/59 -- -- 58 17 94 % -- --   05/20/24 0931 -- -- -- -- 22 -- -- --   05/20/24 0930 (!) 144/52 -- -- 53 -- 94 % -- --   05/20/24 0900 130/50 -- -- 53 20 94 % -- --   05/20/24 0836 130/47 97  F (36.1  C) Temporal 62 16 95 % 1.549 m (5' 1\") 49.9 kg (110 lb)     Physical Exam  General: Alert and oriented.    Head: Normocephalic.  External ears and nose normal.    Eyes: Pupils equal and round.  Normal tracking.    Pulmonary/Chest: Effort and rate normal    MSK: Ecchymosis overlying volar and dorsal aspect of distal radius. Moderate diffuse swelling to distal 1/3 forearm. No open skin. Sensation intact to light touch. Compartments soft. Radial pulse 2+. Capillary refill 2 seconds. No pain to palpation of medial and lateral epicondyles. Full ROM of wrist and shoulder.     SKIN:  Warm and dry with strong radial pulse and cap refill <2 seconds    NEURO:  Normal strength of flexion and extension at MCP, PIP, and DIP joints.  Normal sensation to touch BL in fingers.    PSYCH:  Normal affect    Diagnostics   Lab Results   Labs Ordered and Resulted from Time of ED Arrival to Time of ED Departure   COMPREHENSIVE METABOLIC PANEL - Abnormal       Result Value    Sodium 137      " Potassium 4.6      Carbon Dioxide (CO2) 24      Anion Gap 13      Urea Nitrogen 17.5      Creatinine 1.01 (*)     GFR Estimate 56 (*)     Calcium 9.6      Chloride 100      Glucose 110 (*)     Alkaline Phosphatase 66      AST 13      ALT 16      Protein Total 6.5      Albumin 4.1      Bilirubin Total 1.3 (*)    CBC WITH PLATELETS AND DIFFERENTIAL - Abnormal    WBC Count 7.7      RBC Count 3.78 (*)     Hemoglobin 11.8      Hematocrit 35.2      MCV 93      MCH 31.2      MCHC 33.5      RDW 13.6      Platelet Count 187      % Neutrophils 75      % Lymphocytes 12      % Monocytes 9      % Eosinophils 4      % Basophils 0      % Immature Granulocytes 0      NRBCs per 100 WBC 0      Absolute Neutrophils 5.7      Absolute Lymphocytes 0.9      Absolute Monocytes 0.7      Absolute Eosinophils 0.3      Absolute Basophils 0.0      Absolute Immature Granulocytes 0.0      Absolute NRBCs 0.0         Imaging  XR Wrist Left G/E 3 Views   Final Result   IMPRESSION: Comminuted transverse predominant fracture distal left   radial metaphysis. Associated moderate distal forearm and wrist soft   tissue swelling. Diffuse bone demineralization. Mild ulnar positive   variance. 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      NOTE: ABNORMAL REPORT      THE DICTATION ABOVE DESCRIBES AN ABNORMAL REPORT FOR WHICH FOLLOW-UP   IS NEEDED.      SETH OVERTON MD            SYSTEM ID:  [id number]          EKG   ECG taken at 09:09, ECG read at 09:45  Sinus bradycardia with sinus arrhythmia  Cannot rule out Anterior infarct, age undetermined  Abnormal ECG    No changes as compared to prior, dated 04/12/2024.  Rate 57 bpm. ND interval 146 ms. QRS duration 80 ms. QT/QTc 480/467 ms. P-R-T axes 62 63 78.    Independent Interpretation  None  ED Course    Medications Administered  Medications - No data to display    Procedures  Procedures     Discussion of Management  None    Social Determinants of Health adding to complexity of  care  None    ED Course     Medical Decision Making / Diagnosis   CMS Diagnoses: None    MIPS     None    MDM  83 year old female presents with left wrist pain after a fall last evening.  X-rays demonstrate evidence of Comminuted transverse predominant fracture distal left radial metaphysis. This was splinted as above.  CMS intact, no evidence of neurovascular compromise.  No indication for emergent reduction or orthopedic consultation from the ED.  Head to toe trauma exam otherwise non-concerning.  Patient's fall seems related to standing up too quickly. EKG without any ST changes to suggest ischemia.  No electrolyte abnormalities or anemia.  Not had a recurrence of this dizziness.  Discussed orthopedic follow-up and patient will call to schedule appointment for follow-up in 3 days.  Discussed RICE and pain control with acetaminophen.  The patient's pain is controlled here with acetaminophen from home, I do not think narcotics are necessary at this time due to increased fall risk.  Strict return precautions given for signs of compartment syndrome or neurovascular compromise and these were provided in writing.    Disposition  The patient was discharged.     ICD-10 Codes:    ICD-10-CM    1. Closed fracture of distal end of left radius, unspecified fracture morphology, initial encounter  S52.502A            Yenifer Bailey PA-C

## 2024-05-20 NOTE — DISCHARGE INSTRUCTIONS
Please follow up with [facility] within 1 week. Keep splint dry, please wrap in plastic bag when showering. Take tylenol as needed for pain. Please return to the ED for worsening pain, dizziness, numbness/tingling.

## 2024-05-20 NOTE — TELEPHONE ENCOUNTER
Patient calling. She has very fragile bones. Last night she stood too quickly and was dizzy, and fell down on her left side, catching her fall with her left hand. She now has pain and swelling. She iced and elevated overnight. She is concerned now that she may have broken or sprained her wrist and/or her hand. She endorses numbness in her fingers. She endorses deformity, that it looks like it's over to the left further than it should be.      Care advice given for patient to be seen in the emergency department at [facility]. Patient states that she will call for an Uber for transportation. Patient was instructed in how to make a temporary splint to reduce risk of further injury and stabilize the joint until she could be seen.    Amy Morales RN  [facility]  May 20, 2024, 6:37 AM    Reason for Disposition   Looks like a broken bone (e.g., knuckle is gone or depressed)    Additional Information   Negative: [1] Major bleeding (e.g., actively dripping or spurting) AND [2] can't be stopped   Negative: Amputation   Negative: Serious injury with multiple fractures (broken bones)   Negative: Sounds like a life-threatening emergency to the triager   Negative: Finger injury is main concern   Negative: Caused by an animal bite   Negative: Caused by a human bite   Negative: Wound looks infected   Negative: Cast problems or questions   Negative: Hand pain from overuse (e.g., physical work, typing)   Negative: Hand pain not from an injury   Negative: Bullet wound, stabbed by knife, or other serious penetrating wound    Protocols used: Hand and Wrist Injury-A-

## 2024-05-20 NOTE — ED PROVIDER NOTES
"ED ATTENDING PHYSICIAN NOTE:   I evaluated this patient in conjunction with  Yenifer Bailey PA-C  I have participated in the care of the patient and personally performed key elements of the history, exam, and medical decision making.      HPI:   Karina Lopez is a 83 year old female presents to the ED after a fall on outstretched hand.  This fall was mechanical and did not involve syncopal symptoms prior to or after the episode.    Independent Historian:   None    Review of External Notes:   I reviewed a note from 5/1/2024    EXAM:   Patient Vitals for the past 24 hrs:   BP Temp Temp src Pulse Resp SpO2 Height Weight   05/20/24 1200 (!) 171/66 -- -- 60 10 -- -- --   05/20/24 1145 -- -- -- 61 11 94 % -- --   05/20/24 1100 (!) 161/59 -- -- 53 16 94 % -- --   05/20/24 1027 (!) 165/59 -- -- 58 17 94 % -- --   05/20/24 0931 -- -- -- -- 22 -- -- --   05/20/24 0930 (!) 144/52 -- -- 53 -- 94 % -- --   05/20/24 0900 130/50 -- -- 53 20 94 % -- --   05/20/24 0836 130/47 97  F (36.1  C) Temporal 62 16 95 % 1.549 m (5' 1\") 49.9 kg (110 lb)     General: Alert, No distress. Nontoxic appearance  Head: No signs of trauma.   Mouth/Throat: Oropharynx moist.   Eyes: Conjunctivae are normal. Pupils are equal..   Neck: Normal range of motion.    CV: Appears well perfused.  Resp:No respiratory distress.   MSK: Normal range of motion. No obvious deformity.  Pain and swelling over the left wrist.  Neuro: The patient is alert and interactive. NAIK. Speech normal. GCS 15  Skin: No lesion or sign of trauma noted.   Psych: normal mood and affect. behavior is normal.       Independent Interpretation (X-rays, CTs, rhythm strip):  X-ray of the wrist shows evidence of a fracture    Consultations/Discussion of Management or Tests:  None     Social Determinants of Health affecting care:   Stress/Adjustment Disorders     MEDICAL DECISION MAKING/ASSESSMENT AND PLAN:    This patient is presenting to the ED complaining of left wrist pain " after a fall.  The patient has a evidence of a distal radius fracture.  Patient was placed in a splint.  No indication of reduction required.  The patient will follow-up with orthopedics for further evaluation and treatment.     DIAGNOSIS:     ICD-10-CM    1. Closed fracture of distal end of left radius, unspecified fracture morphology, initial encounter  S52.502A                DISPOSITION:    The patient will be discharged to follow-up with orthopedics for further evaluation.       [facility] EMERGENCY DEPT     Junito Dennis MD  05/21/24 0119

## 2024-05-20 NOTE — ED TRIAGE NOTES
Last night patient got up from her chair felt dizzy fell and landed on her left wrist. Swelling and bruising to left wrist.

## 2024-05-21 ENCOUNTER — OFFICE VISIT (OUTPATIENT)
Dept: ORTHOPEDICS | Facility: CLINIC | Age: 84
End: 2024-05-21
Payer: COMMERCIAL

## 2024-05-21 VITALS
DIASTOLIC BLOOD PRESSURE: 66 MMHG | BODY MASS INDEX: 21.34 KG/M2 | SYSTOLIC BLOOD PRESSURE: 112 MMHG | WEIGHT: 113 LBS | HEIGHT: 61 IN

## 2024-05-21 DIAGNOSIS — S52.532A CLOSED COLLES' FRACTURE OF LEFT RADIUS, INITIAL ENCOUNTER: Primary | ICD-10-CM

## 2024-05-21 PROCEDURE — 99204 OFFICE O/P NEW MOD 45 MIN: CPT | Performed by: STUDENT IN AN ORGANIZED HEALTH CARE EDUCATION/TRAINING PROGRAM

## 2024-05-21 PROCEDURE — 25600 CLTX DST RDL FX/EPHYS SEP WO: CPT | Mod: LT | Performed by: STUDENT IN AN ORGANIZED HEALTH CARE EDUCATION/TRAINING PROGRAM

## 2024-05-21 NOTE — PATIENT INSTRUCTIONS
Caring for Your Cast     A cast is used to protect an injured body part and allow it to heal by limiting the amount of motion occurring around the injury. Pain and swelling of the injured area is normal for 48 hours after your cast is put on. If you have swelling, wiggle your toes or fingers to ease it. Doing so encourages blood flow to your arm or leg.     It is important that you keep your cast dry, unless your doctor tells you differently. If the padding of the cast gets wet, your skin may be damaged and become infected. When showering or taking a bath, put the cast in a heavy plastic bag that can be held in place with a rubber band. If your cast gets wet and does not dry out in four to five hours, call your doctor s office.   To keep the cast clean, use wash clothes or baby wipes around it.   You may experience some itching inside the cast. This is normal. Avoid putting anything in the cast, even your finger, as you can injure your skin and cause infection. Try shaking some talcum powder or blowing cool air from a hair dryer into the cast to ease itching.   If these signs or symptoms develop, call your doctor immediately.      Pain gets worse    Swelling that cuts off blood flow that does not go away, even when you lift the body part above the level of your heart    Fever after itching. It may be related to an infection.    Fluid draining from your skin under the cast     Your cast may become loose as swelling goes down. If the cast feels too loose or if it is so loose you can take it off, call your doctor s office.     Your doctor or  will give you recommendations for activity based on your injury. Some sports allow casts if properly padded by a doctor or .     For complete healing, your cast should only be removed at the direction of your doctor or clinic staff. A special saw ensures its safe removal and protects the skin and other tissue under the cast.

## 2024-05-21 NOTE — LETTER
5/21/2024         RE: Karina Lopez  [address]  [address]  [facility] [id number]        Dear Colleague,    Thank you for referring your patient, Karina Lopez, to the [facility] SP[facility]. Please see a copy of my visit note below.    ASSESSMENT & PLAN    Karina was seen today for pain.    Diagnoses and all orders for this visit:    Closed Colles' fracture of left radius, initial encounter  -     Orthopedic  Referral  -     Cast/splint application      This issue is acute and Unchanged.  Karina presents to our clinic today to discuss her acute left wrist pain after fall.  Her previous radiographs were reviewed and do show a comminuted, transverse fracture of the distal left metaphysis.  Reassuringly, the fracture is in acceptable alignment.  On exam, the patient presented in a long-arm splint and she was having discoloration, swelling of the thumb with numbness and tingling in the first 3 digits.  On removal of the long-arm splint, the symptoms quickly resolved.  She had no tenderness to palpation or pain with movement of the first 3 fingers or thumb to suggest acute compartment syndrome.  We discussed the need for immobilization to ensure proper healing of this fracture and the normal healing time of 4 to 6 weeks, however long-arm immobilization is not required.  We determined the following plan:  -Patient was placed in a short arm cast today.  Anticipate as the swelling goes down, when she follows up next week we may need to replace the cast at that time.  -Anticipate a total of 4 to 6 weeks of immobilization  -She can use Tylenol as needed for pain  -Will follow-up in our clinic next week with repeat x-rays to ensure fracture stability.        Horace Webb DO  [facility] SPOR[facility] MEDIC[facility]    -----  Chief Complaint   Patient presents with     Left Wrist - Pain       SUBJECTIVE  Karina Lopez is a/an 83 year old female who is seen  "in consultation at the request of Yenifer Bailey PA-C for evaluation of left wrist fracture.     The patient is seen with her friend.  The patient is Right handed    Onset: 2 day(s) ago. Patient describes injury as falling at home  Location of Pain: left inside of wrist radiating up arm  Worsened by: moving arm  Better with: none  Treatments tried: casting/splinting/bracing, sling, ice, elevation  Associated symptoms: numbness and tingling in fingers, bruising, swelling    Orthopedic/Surgical history: NO  Social History/Occupation: retired      REVIEW OF SYSTEMS:  Review of systems negative unless mentioned in HPI     OBJECTIVE:  /66   Ht 1.549 m (5' 1\")   Wt 51.3 kg (113 lb)   BMI 21.35 kg/m     General: healthy, alert and in no distress  Skin: no suspicious lesions or rash.  CV: distal perfusion intact   Resp: normal respiratory effort without conversational dyspnea   Psych: normal mood and affect  Gait: NORMAL  Neuro: Normal light sensory exam of DIONNE extremity     Brief Wrist Exam  TTP distal radius  Upon presentation patient was in a long-arm splint, and patient had discoloration of the thumb with swelling and associated numbness and tingling in the first 3 digits.  There is no soft tissue crepitus or tenderness to palpation.  Upon removal of the long-arm splint the above mentioned symptoms resolved quickly.    RADIOLOGY:  Final results and radiologist's interpretation, available in the [facility] health record.  Images were reviewed with the patient in the office today.  My personal interpretation of the performed imaging: Radiographs from 5/20/2024 were reviewed and show a transverse comminuted fracture of the distal radius.  There is associated soft tissue swelling.  CMC joint space narrowing osteophytosis is also seen.    Cast/splint application    Date/Time: 5/21/2024 4:43 PM    Performed by: Gloria Munoz ATC  Authorized by: Horace Webb DO    Consent:     Consent obtained:  Verbal    " Consent given by:  Patient    Risks discussed:  Discoloration, numbness, pain and swelling  Pre-procedure details:     Sensation:  Normal  Procedure details:     Laterality:  Left    Location:  Wrist    Wrist:  L wrist    Strapping: no      Cast type:  Short arm    Supplies:  Fiberglass  Post-procedure details:     Pain:  Improved    Sensation:  Normal    Patient tolerance of procedure:  Tolerated well, no immediate complications    Patient provided with cast or splint care instructions: Yes          Again, thank you for allowing me to participate in the care of your patient.        Sincerely,        Horace Webb, DO

## 2024-05-21 NOTE — PROGRESS NOTES
ASSESSMENT & PLAN    Karina was seen today for pain.    Diagnoses and all orders for this visit:    Closed Colles' fracture of left radius, initial encounter  -     Orthopedic  Referral  -     Cast/splint application      This issue is acute and Unchanged.  Karina presents to our clinic today to discuss her acute left wrist pain after fall.  Her previous radiographs were reviewed and do show a comminuted, transverse fracture of the distal left metaphysis.  Reassuringly, the fracture is in acceptable alignment.  On exam, the patient presented in a long-arm splint and she was having discoloration, swelling of the thumb with numbness and tingling in the first 3 digits.  On removal of the long-arm splint, the symptoms quickly resolved.  She had no tenderness to palpation or pain with movement of the first 3 fingers or thumb to suggest acute compartment syndrome.  We discussed the need for immobilization to ensure proper healing of this fracture and the normal healing time of 4 to 6 weeks, however long-arm immobilization is not required.  We determined the following plan:  -Patient was placed in a short arm cast today.  Anticipate as the swelling goes down, when she follows up next week we may need to replace the cast at that time.  -Anticipate a total of 4 to 6 weeks of immobilization  -She can use Tylenol as needed for pain  -Will follow-up in our clinic next week with repeat x-rays to ensure fracture stability.        Horace Webb DO  [facility] SPOR[facility]ICINE CLINIC [place]    -----  Chief Complaint   Patient presents with    Left Wrist - Pain       SUBJECTIVE  Karina Lopez is a/an 83 year old female who is seen in consultation at the request of Yenifer Bailey PA-C for evaluation of left wrist fracture.     The patient is seen with her friend.  The patient is Right handed    Onset: 2 day(s) ago. Patient describes injury as falling at home  Location of Pain: left inside of wrist  "radiating up arm  Worsened by: moving arm  Better with: none  Treatments tried: casting/splinting/bracing, sling, ice, elevation  Associated symptoms: numbness and tingling in fingers, bruising, swelling    Orthopedic/Surgical history: NO  Social History/Occupation: retired      REVIEW OF SYSTEMS:  Review of systems negative unless mentioned in HPI     OBJECTIVE:  /66   Ht 1.549 m (5' 1\")   Wt 51.3 kg (113 lb)   BMI 21.35 kg/m     General: healthy, alert and in no distress  Skin: no suspicious lesions or rash.  CV: distal perfusion intact   Resp: normal respiratory effort without conversational dyspnea   Psych: normal mood and affect  Gait: NORMAL  Neuro: Normal light sensory exam of DIONNE extremity     Brief Wrist Exam  TTP distal radius  Upon presentation patient was in a long-arm splint, and patient had discoloration of the thumb with swelling and associated numbness and tingling in the first 3 digits.  There is no soft tissue crepitus or tenderness to palpation.  Upon removal of the long-arm splint the above mentioned symptoms resolved quickly.    RADIOLOGY:  Final results and radiologist's interpretation, available in the [facility] health record.  Images were reviewed with the patient in the office today.  My personal interpretation of the performed imaging: Radiographs from 5/20/2024 were reviewed and show a transverse comminuted fracture of the distal radius.  There is associated soft tissue swelling.  CMC joint space narrowing osteophytosis is also seen.    Cast/splint application    Date/Time: 5/21/2024 4:43 PM    Performed by: Gloria Munoz ATC  Authorized by: Horace Webb DO    Consent:     Consent obtained:  Verbal    Consent given by:  Patient    Risks discussed:  Discoloration, numbness, pain and swelling  Pre-procedure details:     Sensation:  Normal  Procedure details:     Laterality:  Left    Location:  Wrist    Wrist:  L wrist    Strapping: no      Cast type:  Short arm    Supplies:  " Fiberglass  Post-procedure details:     Pain:  Improved    Sensation:  Normal    Patient tolerance of procedure:  Tolerated well, no immediate complications    Patient provided with cast or splint care instructions: Yes

## 2024-05-28 ENCOUNTER — OFFICE VISIT (OUTPATIENT)
Dept: ORTHOPEDICS | Facility: CLINIC | Age: 84
End: 2024-05-28
Payer: COMMERCIAL

## 2024-05-28 ENCOUNTER — ANCILLARY PROCEDURE (OUTPATIENT)
Dept: GENERAL RADIOLOGY | Facility: CLINIC | Age: 84
End: 2024-05-28
Attending: STUDENT IN AN ORGANIZED HEALTH CARE EDUCATION/TRAINING PROGRAM
Payer: COMMERCIAL

## 2024-05-28 VITALS
SYSTOLIC BLOOD PRESSURE: 138 MMHG | WEIGHT: 113 LBS | HEIGHT: 61 IN | DIASTOLIC BLOOD PRESSURE: 70 MMHG | BODY MASS INDEX: 21.34 KG/M2

## 2024-05-28 DIAGNOSIS — S52.562D CLOSED BARTON'S FRACTURE OF LEFT RADIUS WITH ROUTINE HEALING, SUBSEQUENT ENCOUNTER: Primary | ICD-10-CM

## 2024-05-28 DIAGNOSIS — S52.532A CLOSED COLLES' FRACTURE OF LEFT RADIUS, INITIAL ENCOUNTER: ICD-10-CM

## 2024-05-28 PROCEDURE — 99024 POSTOP FOLLOW-UP VISIT: CPT | Performed by: STUDENT IN AN ORGANIZED HEALTH CARE EDUCATION/TRAINING PROGRAM

## 2024-05-28 PROCEDURE — 73110 X-RAY EXAM OF WRIST: CPT | Mod: TC | Performed by: RADIOLOGY

## 2024-05-28 NOTE — PROGRESS NOTES
"ASSESSMENT & PLAN    Karina was seen today for follow up.    Diagnoses and all orders for this visit:    Closed Brenden's fracture of left radius with routine healing, subsequent encounter  -     XR Wrist Left G/E 3 Views; Future      This issue is acute and Unchanged. Karina presents to clinic today to follow-up on her acute left wrist fracture.  Repeat radiographs taken in clinic today show unchanged alignment of the fracture, which is reassuring.  On exam today, the patient's distal extremity is neurovascularly intact and the cast still fits appropriately on the patient's left wrist and forearm.  This point, we can proceed with several more weeks of immobilization to ensure proper healing.  We determined the following plan:  - Patient to remain in a short of cast for another 3 weeks  - She can continue to use over-the-counter pain medicines as needed  - She can follow-up in our clinic in 3 weeks well repeat images out of the cast at that time to determine next steps.        Horace Webb [facility] SPORTS [facility]ICINE CLINIC [place]    SUBJECTIVE- Interim History May 28, 2024    Chief Complaint   Patient presents with    Left Wrist - Follow Up       Karina Lopez is a 83 year old female who is seen in f/u up for left wrist fracture. Since last visit on 5/21/24 patient has continued with short arm cast.  - Now ~ 9 days from initial onset    Worsened by: moving arm  Better with: cast  Treatments tried: casting/splinting/bracing, sling  Associated symptoms:  numbness in fingers    The patient is seen by themselves.  The patient is Right handed    Orthopedic/Surgical history: NO  Social History/Occupation: retired      REVIEW OF SYSTEMS:  Review of Systems   All other systems reviewed and are negative.      OBJECTIVE:  /70   Ht 1.549 m (5' 1\")   Wt 51.3 kg (113 lb)   BMI 21.35 kg/m     General: healthy, alert and in no distress  Skin: no suspicious lesions or rash.  CV: distal perfusion intact "   Resp: normal respiratory effort without conversational dyspnea   Psych: normal mood and affect  Gait: NORMAL  Neuro: Normal light sensory exam of RU extremity     RADIOLOGY:  Final results and radiologist's interpretation, available in the [facility] health record.  Images were reviewed with the patient in the office today.  My personal interpretation of the performed imaging: Redemonstration of the known distal left radius fracture with unchanged alignment.  Imaging findings are partially obscured by the overlying cast material.

## 2024-05-28 NOTE — LETTER
5/28/2024         RE: Karina Lopez  [address]  [address]  [facility] [place] [id number]        Dear Colleague,    Thank you for referring your patient, Karina Lopez, to the [facility] SPORTS [facility]. Please see a copy of my visit note below.    ASSESSMENT & PLAN    Karina was seen today for follow up.    Diagnoses and all orders for this visit:    Closed Wilcox's fracture of left radius with routine healing, subsequent encounter  -     XR Wrist Left G/E 3 Views; Future      This issue is acute and Unchanged. Karina presents to clinic today to follow-up on her acute left wrist fracture.  Repeat radiographs taken in clinic today show unchanged alignment of the fracture, which is reassuring.  On exam today, the patient's distal extremity is neurovascularly intact and the cast still fits appropriately on the patient's left wrist and forearm.  This point, we can proceed with several more weeks of immobilization to ensure proper healing.  We determined the following plan:  - Patient to remain in a short of cast for another 3 weeks  - She can continue to use over-the-counter pain medicines as needed  - She can follow-up in our clinic in 3 weeks well repeat images out of the cast at that time to determine next steps.        Horace Webb,   [facility] SPORTS [facility]    SUBJECTIVE- Interim History May 28, 2024    Chief Complaint   Patient presents with     Left Wrist - Follow Up       Karina Lopez is a 83 year old female who is seen in f/u up for left wrist fracture. Since last visit on 5/21/24 patient has continued with short arm cast.  - Now ~ 9 days from initial onset    Worsened by: moving arm  Better with: cast  Treatments tried: casting/splinting/bracing, sling  Associated symptoms:  numbness in fingers    The patient is seen by themselves.  The patient is Right handed    Orthopedic/Surgical history: NO  Social History/Occupation: retired      REVIEW  "OF SYSTEMS:  Review of Systems   All other systems reviewed and are negative.      OBJECTIVE:  /70   Ht 1.549 m (5' 1\")   Wt 51.3 kg (113 lb)   BMI 21.35 kg/m     General: healthy, alert and in no distress  Skin: no suspicious lesions or rash.  CV: distal perfusion intact   Resp: normal respiratory effort without conversational dyspnea   Psych: normal mood and affect  Gait: NORMAL  Neuro: Normal light sensory exam of RU extremity     RADIOLOGY:  Final results and radiologist's interpretation, available in the [facility] health record.  Images were reviewed with the patient in the office today.  My personal interpretation of the performed imaging: Redemonstration of the known distal left radius fracture with unchanged alignment.  Imaging findings are partially obscured by the overlying cast material.           Again, thank you for allowing me to participate in the care of your patient.        Sincerely,        Horace Webb, DO  "

## 2024-06-04 ENCOUNTER — TELEPHONE (OUTPATIENT)
Dept: FAMILY MEDICINE | Facility: CLINIC | Age: 84
End: 2024-06-04
Payer: COMMERCIAL

## 2024-06-04 DIAGNOSIS — M81.0 OSTEOPOROSIS, POSTMENOPAUSAL: Primary | ICD-10-CM

## 2024-06-04 NOTE — TELEPHONE ENCOUNTER
Order/Referral Request    Who is requesting: Patient    Orders being requested:Bone density test    Reason service is needed/diagnosis:   It's been a long time since she's done this.     Broke L wrist about 2 wks ago.     Broke 3 ribs in April, 2024.     When are orders needed by: ASAP    Has this been discussed with Provider: No    Does patient have a preference on a Group/Provider/Facility? [place]    Does patient have an appointment scheduled?: No    Where to send orders: Place orders within Epic    Could we send this information to you in [place] or would you prefer to receive a phone call?:   Patient would prefer a phone call     Okay to leave a detailed message?: Yes at Cell number on file:    Telephone Information:   Mobile [phone]     Lyndsay Crump on 6/4/2024 at 4:44 PM

## 2024-06-04 NOTE — TELEPHONE ENCOUNTER
Is treatment done through the PCP clinic or elsewhere?  I'll call pt to inform and schedule if able.    Lyndsay Crump on 6/4/2024 at 5:23 PM  [facility]

## 2024-06-05 NOTE — TELEPHONE ENCOUNTER
KELLYM with pt to call [phone] to schedule bone density appointment.      Order placed by BARBI Malik on 6/5/2024 at 11:43 AM

## 2024-06-06 ENCOUNTER — HOSPITAL ENCOUNTER (OUTPATIENT)
Dept: BONE DENSITY | Facility: CLINIC | Age: 84
Discharge: HOME | End: 2024-06-06
Payer: COMMERCIAL

## 2024-06-06 DIAGNOSIS — M81.0 OSTEOPOROSIS, POSTMENOPAUSAL: ICD-10-CM

## 2024-06-06 PROCEDURE — 77080 DXA BONE DENSITY AXIAL: CPT

## 2024-06-11 ENCOUNTER — VIRTUAL VISIT (OUTPATIENT)
Dept: FAMILY MEDICINE | Facility: CLINIC | Age: 84
End: 2024-06-11
Payer: COMMERCIAL

## 2024-06-11 DIAGNOSIS — M81.0 OSTEOPOROSIS, POSTMENOPAUSAL: Primary | ICD-10-CM

## 2024-06-11 DIAGNOSIS — R42 DIZZINESS: ICD-10-CM

## 2024-06-11 DIAGNOSIS — N18.31 STAGE 3A CHRONIC KIDNEY DISEASE (H): ICD-10-CM

## 2024-06-11 DIAGNOSIS — Z86.79 HX OF REPAIR OF DISSECTING THORACIC AORTIC ANEURYSM, STANFORD TYPE A: ICD-10-CM

## 2024-06-11 DIAGNOSIS — Z98.890 HX OF REPAIR OF DISSECTING THORACIC AORTIC ANEURYSM, STANFORD TYPE A: ICD-10-CM

## 2024-06-11 DIAGNOSIS — I35.1 AORTIC VALVE INSUFFICIENCY, ETIOLOGY OF CARDIAC VALVE DISEASE UNSPECIFIED: ICD-10-CM

## 2024-06-11 PROCEDURE — 99442 PR PHYSICIAN TELEPHONE EVALUATION 11-20 MIN: CPT | Mod: 93

## 2024-06-11 RX ORDER — ALENDRONATE SODIUM 70 MG/1
70 TABLET ORAL
Status: CANCELLED | OUTPATIENT
Start: 2024-06-11

## 2024-06-11 NOTE — PROGRESS NOTES
Karina is a 83 year old who is being evaluated via a billable telephone visit.    What phone number would you like to be contacted at? [phone]   How would you like to obtain your AVS? [place]  Originating Location (pt. Location): Home  Distant Location (provider location):  On-site    Assessment & Plan     Stage 3a chronic kidney disease (H)  Osteoporosis, postmenopausal  Not a candidate for bisphosphonate therapy in treatment of osteoporosis due to CKD and low creatinine clearance. Will plan on Prolia injections to help with bone density with plan to repeat DEXA scan in ~2 years  - denosumab (PROLIA) injection 60 mg    Dizziness  Hx of repair of dissecting thoracic aortic aneurysm, Vasile type A  Aortic Valve Insufficiency  On amlodipine, labetalol, and losartan; follows with cardiology. Will reach out to cardiology with advised follow-up to review medications with patient to rule out cardiac origin for suspected orthostatic hypotension and possible med adjustment        FUTURE APPOINTMENTS:       - Prolia injection       - Follow-up for annual visit or as needed    Subjective   Karina is a 83 year old, presenting for the following health issues:  Follow Up    Here to follow-up on her latest bone density scan  She fills me in on the episode that landed her in the ER with a broken wrist. She said she stood up too fast and got dizzy, so she fell and landed on her wrist which ended up in a fracture. She is currently in a cast and sees ortho.  [place] [facility] [place]   Notes daily dizziness, particularly with position changes                 Review of Systems  Constitutional, HEENT, cardiovascular, pulmonary, gi and gu systems are negative, except as otherwise noted.      Objective           Vitals:  No vitals were obtained today due to virtual visit.    Physical Exam   General: Alert and no distress //Respiratory: No audible wheeze, cough, or shortness of breath // Psychiatric:  Appropriate  affect, tone, and pace of words            Phone call duration: 19 minutes  Signed Electronically by: Smitha Bowen, BARBI, APRN, CNP

## 2024-06-12 ENCOUNTER — PATIENT OUTREACH (OUTPATIENT)
Dept: FAMILY MEDICINE | Facility: CLINIC | Age: 84
End: 2024-06-12
Payer: COMMERCIAL

## 2024-06-12 NOTE — TELEPHONE ENCOUNTER
Called to notify that bisphosphonate therapy was not recommended due to kidney function, wanted to discuss additional options for bone health. Left voicemail with brief details.

## 2024-06-17 NOTE — PROGRESS NOTES
ASSESSMENT & PLAN    Karina was seen today for follow up.    Diagnoses and all orders for this visit:    Closed Brenden's fracture of left radius with routine healing, subsequent encounter  -     XR Wrist Left G/E 3 Views; Future      This issue is acute and Improving. Karina dents to clinic to follow-up on her known fracture of the left distal radius.  Radiographs taken in clinic today were reviewed with the patient and show continued signs of healing with unchanged alignment of her known distal radius fracture.  On exam, the patient has mild tenderness to palpation over the distal radius but has no pain with active range of motion despite some stiffness.  We discussed that she will likely have some lasting stiffness due to the nature of her fracture.  We discussed that given she has no pain with movement and minimal tenderness palpation, we can advance her out of the cast and into a removable wrist brace for protection for the next 2 weeks.  We will also get her working on range of motion with hand therapy.  We determined the following plan:  - Patient advance out of cast and into a removable wrist splint today, she will wear this for the next 2 weeks with all activities.  When resting, can go out of the splint and work on gentle range of motion.  - Hand therapy referral placed today to begin range of motion exercises  - She can otherwise use over-the-counter pain medicines, ice, and heat as needed  - She can follow-up in our clinic as needed      Horace Webb [facility] SPORTS MEDICINE CLINIC [place]    SUBJECTIVE- Interim History June 17, 2024    Chief Complaint   Patient presents with    Left Wrist - Follow Up       Karina Lopez is a 83 year old female who is seen in f/u up for left wrist fracture. Since last visit on 5/28/24 patient has remained in a short of cast.  - Now ~ 4 weeks from initial onset    Worsened by: none  Better with: cast  Treatments tried:  "casting/splinting/bracing  Associated symptoms:  no distal numbness or tingling; denies swelling or warmth    The patient is seen by themselves.  The patient is Right handed    Orthopedic/Surgical history: NO  Social History/Occupation: retired      REVIEW OF SYSTEMS:  Review of Systems    OBJECTIVE:  /53   Ht 1.549 m (5' 1\")   Wt 51.3 kg (113 lb)   BMI 21.35 kg/m     General: healthy, alert and in no distress  Skin: no suspicious lesions or rash.  CV: distal perfusion intact   Resp: normal respiratory effort without conversational dyspnea   Psych: normal mood and affect  Gait: NORMAL  Neuro: Normal light sensory exam of DIONNE extremity   Brief Wrist Exam  Mild TTP distal radius  Limited ROM due to stiffness, no pain    RADIOLOGY:  Final results and radiologist's interpretation, available in the [facility] health record.  Images were reviewed with the patient in the office today.  My personal interpretation of the performed imaging: Increased sclerosis and signs of healing of the distal radial fracture.         "

## 2024-06-18 ENCOUNTER — OFFICE VISIT (OUTPATIENT)
Dept: ORTHOPEDICS | Facility: CLINIC | Age: 84
End: 2024-06-18
Payer: COMMERCIAL

## 2024-06-18 ENCOUNTER — ANCILLARY PROCEDURE (OUTPATIENT)
Dept: GENERAL RADIOLOGY | Facility: CLINIC | Age: 84
End: 2024-06-18
Attending: STUDENT IN AN ORGANIZED HEALTH CARE EDUCATION/TRAINING PROGRAM
Payer: COMMERCIAL

## 2024-06-18 VITALS
BODY MASS INDEX: 21.34 KG/M2 | SYSTOLIC BLOOD PRESSURE: 129 MMHG | WEIGHT: 113 LBS | HEIGHT: 61 IN | DIASTOLIC BLOOD PRESSURE: 53 MMHG

## 2024-06-18 DIAGNOSIS — S52.572D OTHER CLOSED INTRA-ARTICULAR FRACTURE OF DISTAL END OF LEFT RADIUS WITH ROUTINE HEALING, SUBSEQUENT ENCOUNTER: Primary | ICD-10-CM

## 2024-06-18 DIAGNOSIS — S52.562D CLOSED BARTON'S FRACTURE OF LEFT RADIUS WITH ROUTINE HEALING, SUBSEQUENT ENCOUNTER: ICD-10-CM

## 2024-06-18 PROCEDURE — 99024 POSTOP FOLLOW-UP VISIT: CPT | Performed by: STUDENT IN AN ORGANIZED HEALTH CARE EDUCATION/TRAINING PROGRAM

## 2024-06-18 PROCEDURE — 73110 X-RAY EXAM OF WRIST: CPT | Mod: TC | Performed by: RADIOLOGY

## 2024-06-18 NOTE — LETTER
6/18/2024      Karina Lopez  [address]  [address]  [facility] [address]      Dear Colleague,    Thank you for referring your patient, Karina Lopez, to the [facility] SP[facility]TS MEDICINE [facility]. Please see a copy of my visit note below.    ASSESSMENT & PLAN    Karina was seen today for follow up.    Diagnoses and all orders for this visit:    Closed Wilcox's fracture of left radius with routine healing, subsequent encounter  -     XR Wrist Left G/E 3 Views; Future      This issue is acute and Improving. Karina dents to clinic to follow-up on her known fracture of the left distal radius.  Radiographs taken in clinic today were reviewed with the patient and show continued signs of healing with unchanged alignment of her known distal radius fracture.  On exam, the patient has mild tenderness to palpation over the distal radius but has no pain with active range of motion despite some stiffness.  We discussed that she will likely have some lasting stiffness due to the nature of her fracture.  We discussed that given she has no pain with movement and minimal tenderness palpation, we can advance her out of the cast and into a removable wrist brace for protection for the next 2 weeks.  We will also get her working on range of motion with hand therapy.  We determined the following plan:  - Patient advance out of cast and into a removable wrist splint today, she will wear this for the next 2 weeks with all activities.  When resting, can go out of the splint and work on gentle range of motion.  - Hand therapy referral placed today to begin range of motion exercises  - She can otherwise use over-the-counter pain medicines, ice, and heat as needed  - She can follow-up in our clinic as needed      Horace Wbeb, DO  [facility] SPOR[facility] MEDICINE [facility]    SUBJECTIVE- Interim History June 17, 2024    Chief Complaint   Patient presents with     Left Wrist - Follow Up       Karina HAMPTON  "Jessica is a 83 year old female who is seen in f/u up for left wrist fracture. Since last visit on 5/28/24 patient has remained in a short of cast.  - Now ~ 4 weeks from initial onset    Worsened by: none  Better with: cast  Treatments tried: casting/splinting/bracing  Associated symptoms:  no distal numbness or tingling; denies swelling or warmth    The patient is seen by themselves.  The patient is Right handed    Orthopedic/Surgical history: NO  Social History/Occupation: retired      REVIEW OF SYSTEMS:  Review of Systems    OBJECTIVE:  /53   Ht 1.549 m (5' 1\")   Wt 51.3 kg (113 lb)   BMI 21.35 kg/m     General: healthy, alert and in no distress  Skin: no suspicious lesions or rash.  CV: distal perfusion intact   Resp: normal respiratory effort without conversational dyspnea   Psych: normal mood and affect  Gait: NORMAL  Neuro: Normal light sensory exam of DIONNE extremity   Brief Wrist Exam  Mild TTP distal radius  Limited ROM due to stiffness, no pain    RADIOLOGY:  Final results and radiologist's interpretation, available in the [facility] health record.  Images were reviewed with the patient in the office today.  My personal interpretation of the performed imaging: Increased sclerosis and signs of healing of the distal radial fracture.             Again, thank you for allowing me to participate in the care of your patient.        Sincerely,        Horace Webb, DO  "

## 2024-06-26 ENCOUNTER — THERAPY VISIT (OUTPATIENT)
Dept: OCCUPATIONAL THERAPY | Facility: CLINIC | Age: 84
End: 2024-06-26
Payer: COMMERCIAL

## 2024-06-26 DIAGNOSIS — R26.89 BALANCE PROBLEMS: ICD-10-CM

## 2024-06-26 DIAGNOSIS — R29.6 FREQUENT FALLS: Primary | ICD-10-CM

## 2024-06-26 DIAGNOSIS — S52.572D OTHER CLOSED INTRA-ARTICULAR FRACTURE OF DISTAL END OF LEFT RADIUS WITH ROUTINE HEALING, SUBSEQUENT ENCOUNTER: ICD-10-CM

## 2024-06-26 PROCEDURE — 97110 THERAPEUTIC EXERCISES: CPT | Mod: GO | Performed by: OCCUPATIONAL THERAPIST

## 2024-06-26 PROCEDURE — 97035 APP MDLTY 1+ULTRASOUND EA 15: CPT | Mod: GO | Performed by: OCCUPATIONAL THERAPIST

## 2024-06-26 PROCEDURE — 97140 MANUAL THERAPY 1/> REGIONS: CPT | Mod: GO | Performed by: OCCUPATIONAL THERAPIST

## 2024-06-26 PROCEDURE — 97165 OT EVAL LOW COMPLEX 30 MIN: CPT | Mod: GO | Performed by: OCCUPATIONAL THERAPIST

## 2024-06-26 NOTE — PROGRESS NOTES
Message from hand therapy requesting PT referral for balance and fall problems given patient's frequent falls, which is reasonable.  PT referral placed.  No further action needed.    SANDRA Webb, DO

## 2024-06-26 NOTE — PROGRESS NOTES
OCCUPATIONAL THERAPY EVALUATION  Type of Visit: Evaluation       Fall Risk Screen:  Fall screen completed by: OT  Have you fallen 2 or more times in the past year?: Yes  Have you fallen and had an injury in the past year?: Yes  Is patient a fall risk?: Yes    Subjective      Presenting condition or subjective complaint: broken wrist  Date of onset: 05/21/24    Relevant medical history:     Dates & types of surgery: aorta Oct 2012    Prior diagnostic imaging/testing results: MRI; CT scan; X-ray; Bone scan     Prior therapy history for the same diagnosis, illness or injury:        Prior Level of Function  Transfers: Independent  Ambulation: Independent  ADL: Independent  IADL: Driving, Finances, Housekeeping, Laundry, Meal preparation    Living Environment  Social support: Alone   Type of home: Apartment/condo   Stairs to enter the home:         Ramp: No   Stairs inside the home: Yes 1 Is there a railing: Yes     Help at home: None  Equipment owned: Straight Cane; Walker with wheels     Employment:      Hobbies/Interests:      Patient goals for therapy: use lefthand for golf kayaking and other actities       Objective   ADDITIONAL HISTORY:  Right hand dominant  Patient reports symptoms of pain, stiffness/loss of motion, weakness/loss of strength, and edema  Transportation: drives  Currently retired    Upper Extremity Functional Index Score:  SCORE:   Column Totals: /80: 59   (A lower score indicates greater disability.)    Objective:      Pain Level (Scale 0-10):   6/26/2024   At Rest 0   With Use 9-10     Pain Description:  Date 6/26/2024   Location wrist   Pain Quality Sharp   Frequency intermittent     Pain is worst  daytime   Exacerbated by  Move a certain way   Relieved by none   Progression Rapidly getting better.      Sensation:  WNL throughout all nerve distributions; per patient report    Edema (Circumference measured in cm)   6/26/2024 6/26/2024    Right Left   DWC 14.3 15.8      ROM  Pain Report:  -none +  "mild    ++ moderate    +++ severe     Wrist 6/26/2024 6/26/2024   AROM (PROM) Right Left   Extension 80 70 pre  75 post   Flexion 90 60++  65 ++ post   RD 30 24-   UD 45 25 ++ to +++   Supination 85 85+   Pronation 90 90+     Thumb 6/26/2024     Full ROM, however a \"pull on dorsal thumb\"      Palpation  Pain Report:  -none + mild    ++ moderate    +++ severe    6/26/2024   Radial Styloid -   DRSN -   ECU ++ with pt supinated   snuffbox -   CMC/volar wrist +         Strength   (Measured in pounds)  Pain Report: - none  + mild    ++ moderate    +++ severe    6/26/2024 6/26/2024   Trials Right Left   1  2  3     Average 41# 10#++     Lat Pinch 6/26/2024 6/26/2024   Trials Right Left   1  2  3     Average 12# 5#     3 Pt Pinch 6/26/2024 6/26/2024   Trials Right Left   1  2  3     Average 12# 1#       Assessment & Plan   CLINICAL IMPRESSIONS  Medical Diagnosis: Left DRF    Treatment Diagnosis: Left DRF    Impression/Assessment: Pt is a 83 year old female presenting to Occupational Therapy due to DRF.  The following significant findings have been identified: Impaired ROM, Impaired strength, and Pain.  These identified deficits interfere with their ability to perform self care tasks, recreational activities, household chores, driving , financial management, and meal planning and preparation as compared to previous level of function.   Patient's limitations or Problem List includes: Pain, Decreased ROM/motion, Increased edema, Weakness, Decreased , Decreased pinch, and Tightness in musculature of the left wrist which interferes with the patient's ability to perform Self Care Tasks (dressing, eating, bathing, hygiene/toileting), Work Tasks, Recreational Activities, Household Chores, and Driving  as compared to previous level of function.    Clinical Decision Making (Complexity):  Assessment of Occupational Performance: 5 or more Performance Deficits  Occupational Performance Limitations: bathing/showering, dressing, " feeding, hygiene and grooming, driving and community mobility, health management and maintenance, home establishment and management, meal preparation and cleanup, shopping, and leisure activities  Clinical Decision Making (Complexity): Low complexity    PLAN OF CARE  Treatment Interventions:  Modalities:  US  Therapeutic Exercise:  AROM, Isotonics, and Isometrics  Neuromuscular re-education:  Kinesiotaping  Manual Techniques:  Joint mobilization, Friction massage, and Myofascial release    Long Term Goals   OT Goal 1  Goal Identifier: house chores  Goal Description:  pan for cooking with no pain and minimal difficulty  Rationale: In order to maximize safety and independence with ADL/IADLs  Target Date: 09/23/24      Frequency of Treatment: 1x/week  Duration of Treatment: 3 months     Recommended Referrals to Other Professionals: Physical Therapy for balance  Education Assessment:       Risks and benefits of evaluation/treatment have been explained.   Patient/Family/caregiver agrees with Plan of Care.     Evaluation Time:    OT Eval, Low Complexity Minutes (76310): 20   Present: Not applicable     Signing Clinician: JOSEPH Lala [facility]                                                                                   OUTPATIENT OCCUPATIONAL THERAPY      PLAN OF TREATMENT FOR OUTPATIENT REHABILITATION   Patient's Last Name, First Name, Karina Arnett YOB: 1940   Provider's Name   [facility]   Medical Record No.  [id number]     Onset Date: 05/21/24 Start of Care Date: 06/26/24     Medical Diagnosis:  Left DRF      OT Treatment Diagnosis:  Left DRF Plan of Treatment  Frequency/Duration:1x/week/3 months    Certification date from 06/26/24   To 09/23/24        See note for plan of treatment details and functional goals     CATHY Lala, CHT                         I CERTIFY THE NEED FOR THESE  SERVICES FURNISHED UNDER        THIS PLAN OF TREATMENT AND WHILE UNDER MY CARE     (Physician attestation of this document indicates review and certification of the therapy plan).              Referring Provider:  Horace Webb    Initial Assessment  See Epic Evaluation- 06/26/24

## 2024-07-01 ENCOUNTER — THERAPY VISIT (OUTPATIENT)
Dept: OCCUPATIONAL THERAPY | Facility: CLINIC | Age: 84
End: 2024-07-01
Payer: COMMERCIAL

## 2024-07-01 DIAGNOSIS — M79.602 LEFT ARM PAIN: Primary | ICD-10-CM

## 2024-07-01 PROCEDURE — 97140 MANUAL THERAPY 1/> REGIONS: CPT | Mod: GO | Performed by: OCCUPATIONAL THERAPIST

## 2024-07-01 PROCEDURE — 97035 APP MDLTY 1+ULTRASOUND EA 15: CPT | Mod: GO | Performed by: OCCUPATIONAL THERAPIST

## 2024-07-10 ENCOUNTER — THERAPY VISIT (OUTPATIENT)
Dept: OCCUPATIONAL THERAPY | Facility: CLINIC | Age: 84
End: 2024-07-10
Payer: COMMERCIAL

## 2024-07-10 DIAGNOSIS — M79.602 LEFT ARM PAIN: Primary | ICD-10-CM

## 2024-07-10 PROCEDURE — 97110 THERAPEUTIC EXERCISES: CPT | Mod: GO | Performed by: OCCUPATIONAL THERAPIST

## 2024-07-10 PROCEDURE — 97140 MANUAL THERAPY 1/> REGIONS: CPT | Mod: GO | Performed by: OCCUPATIONAL THERAPIST

## 2024-07-10 PROCEDURE — 97035 APP MDLTY 1+ULTRASOUND EA 15: CPT | Mod: GO | Performed by: OCCUPATIONAL THERAPIST

## 2024-07-12 ENCOUNTER — NURSE TRIAGE (OUTPATIENT)
Dept: FAMILY MEDICINE | Facility: CLINIC | Age: 84
End: 2024-07-12
Payer: COMMERCIAL

## 2024-07-12 NOTE — TELEPHONE ENCOUNTER
Order/Referral Request    Who is requesting: Patient    Orders being requested: Referral to eval dizziness    Reason service is needed/diagnosis: dizziness    When are orders needed by: n/a    Has this been discussed with Provider: Yes    Does patient have a preference on a Group/Provider/Facility? [facility]    Does patient have an appointment scheduled?: No    Where to send orders: Fax [phone]    Could we send this information to you in [facility] or would you prefer to receive a phone call?:   Patient would prefer a phone call   Okay to leave a detailed message?: Yes at Home number on file [phone] (home)

## 2024-07-15 NOTE — TELEPHONE ENCOUNTER
Nurse Triage SBAR    Is this a 2nd Level Triage? YES, LICENSED PRACTITIONER REVIEW IS REQUIRED    Situation: dizziness    Background: dizziness when getting out of bed or sitting position.     Assessment: Dizziness when getting out of bed, sitting position to standing. States she drinks about a quart of water per day. Wants to see Susan Pena. Pulse in 60s.     Protocol Recommended Disposition:   See in Office Today    Recommendation: appt made. Advised patient to drink more water, get up slowly and wait for approx 5-10 seconds before walking.     Patient wants to only see Susan Pena, unable to come in today or tomorrow.     Appointments in Next Year        Jul 17, 2024 1:30 PM  (Arrive by 1:10 PM)  [facility] with [facility]-C  [facility] [phone]       Routed to provider    Does the patient meet one of the following criteria for ADS visit consideration? 16+ years old, with an [facility] PCP     TIP  Providers, please consider if this condition is appropriate for management at one of our Acute and Diagnostic Services sites.     If patient is a good candidate, please use dotphrase <dot>triageresponse and select Refer to ADS to document.       Reason for Disposition   Patient wants to be seen    Additional Information   Negative: SEVERE difficulty breathing (e.g., struggling for each breath, speaks in single words)   Negative: Shock suspected (e.g., cold/pale/clammy skin, too weak to stand, low BP, rapid pulse)   Negative: Difficult to awaken or acting confused (e.g., disoriented, slurred speech)   Negative: Fainted, and still feels dizzy afterwards   Negative: Overdose (accidental or intentional) of medications   Negative: New neurologic deficit that is present now: * Weakness of the face, arm, or leg on one side of the body * Numbness of the face, arm, or leg on one side of the body * Loss of speech or garbled speech   Negative: Heart beating < 50 beats  per minute OR > 140 beats per minute   Negative: Sounds like a life-threatening emergency to the triager   Negative: Chest pain   Negative: Rectal bleeding, bloody stool, or tarry-black stool   Negative: Vomiting is main symptom   Negative: Diarrhea is main symptom   Negative: Headache is main symptom   Negative: Heat exhaustion suspected (i.e., dehydration from heat exposure)   Negative: Patient states that they are having an anxiety or panic attack   Negative: Dizziness from low blood sugar (i.e., < 60 mg/dl or 3.5 mmol/l)   Negative: SEVERE dizziness (e.g., unable to stand, requires support to walk, feels like passing out now)   Negative: SEVERE headache or neck pain   Negative: Spinning or tilting sensation (vertigo) present now and one or more stroke risk factors (i.e., hypertension, diabetes mellitus, prior stroke/TIA, heart attack, age over 60) (Exception: Prior physician evaluation for this AND no different/worse than usual.)   Negative: Neurologic deficit that was brief (now gone), ANY of the following:* Weakness of the face, arm, or leg on one side of the body* Numbness of the face, arm, or leg on one side of the body* Loss of speech or garbled speech   Negative: Loss of vision or double vision  (Exception: Similar to previous migraines.)   Negative: Extra heartbeats, irregular heart beating, or heart is beating very fast (i.e., 'palpitations')   Negative: Difficulty breathing   Negative: Drinking very little and dehydration suspected (e.g., no urine > 12 hours, very dry mouth, very lightheaded)   Negative: Follows bleeding (e.g., stomach, rectum, vagina)  (Exception: Became dizzy from sight of small amount blood.)   Negative: Patient sounds very sick or weak to the triager   Negative: Lightheadedness (dizziness) present now, after 2 hours of rest and fluids   Negative: Spinning or tilting sensation (vertigo) present now   Negative: Fever > 103 F (39.4 C)   Negative: Fever > 100.0 F (37.8 C) and has  "diabetes mellitus or a weak immune system (e.g., HIV positive, cancer chemotherapy, organ transplant, splenectomy, chronic steroids)   Negative: MODERATE dizziness (e.g., interferes with normal activities)  (Exception: Dizziness caused by heat exposure, sudden standing, or poor fluid intake.)   Negative: Vomiting occurs with dizziness    Answer Assessment - Initial Assessment Questions  1. DESCRIPTION: \"Describe your dizziness.\"      When I stand up, I get dizzy if I get up too fast.   2. LIGHTHEADED: \"Do you feel lightheaded?\" (e.g., somewhat faint, woozy, weak upon standing)      woozy  3. VERTIGO: \"Do you feel like either you or the room is spinning or tilting?\" (i.e. vertigo)      no  4. SEVERITY: \"How bad is it?\"  \"Do you feel like you are going to faint?\" \"Can you stand and walk?\"    - MILD: Feels slightly dizzy, but walking normally.    - MODERATE: Feels unsteady when walking, but not falling; interferes with normal activities (e.g., school, work).    - SEVERE: Unable to walk without falling, or requires assistance to walk without falling; feels like passing out now.       moderate  5. ONSET:  \"When did the dizziness begin?\"      I only have one kidney and it started a little before April  6. AGGRAVATING FACTORS: \"Does anything make it worse?\" (e.g., standing, change in head position)      After standing.   7. HEART RATE: \"Can you tell me your heart rate?\" \"How many beats in 15 seconds?\"  (Note: not all patients can do this)        See chart  8. CAUSE: \"What do you think is causing the dizziness?\"      I don't know  9. RECURRENT SYMPTOM: \"Have you had dizziness before?\" If Yes, ask: \"When was the last time?\" \"What happened that time?\"      This morning.   10. OTHER SYMPTOMS: \"Do you have any other symptoms?\" (e.g., fever, chest pain, vomiting, diarrhea, bleeding)        no  11. PREGNANCY: \"Is there any chance you are pregnant?\" \"When was your last menstrual period?\"        No    Protocols used: " Dizziness-A-OH

## 2024-07-16 ENCOUNTER — HOSPITAL ENCOUNTER (OUTPATIENT)
Dept: CARDIOLOGY | Facility: CLINIC | Age: 84
Discharge: HOME | End: 2024-07-16
Attending: INTERNAL MEDICINE | Admitting: INTERNAL MEDICINE
Payer: COMMERCIAL

## 2024-07-16 DIAGNOSIS — I35.1 AORTIC VALVE INSUFFICIENCY, ETIOLOGY OF CARDIAC VALVE DISEASE UNSPECIFIED: ICD-10-CM

## 2024-07-16 LAB — LVEF ECHO: NORMAL

## 2024-07-16 PROCEDURE — 93306 TTE W/DOPPLER COMPLETE: CPT

## 2024-07-16 PROCEDURE — 93306 TTE W/DOPPLER COMPLETE: CPT | Mod: 26 | Performed by: INTERNAL MEDICINE

## 2024-07-16 NOTE — TELEPHONE ENCOUNTER
Triage Patient Outreach    Attempt # 1    Was call answered?  No.  Left voicemail to return call to Triage at [facility]    Janice John RN

## 2024-07-17 ENCOUNTER — OFFICE VISIT (OUTPATIENT)
Dept: FAMILY MEDICINE | Facility: CLINIC | Age: 84
End: 2024-07-17
Payer: COMMERCIAL

## 2024-07-17 ENCOUNTER — THERAPY VISIT (OUTPATIENT)
Dept: OCCUPATIONAL THERAPY | Facility: CLINIC | Age: 84
End: 2024-07-17
Payer: COMMERCIAL

## 2024-07-17 VITALS
WEIGHT: 110.8 LBS | DIASTOLIC BLOOD PRESSURE: 56 MMHG | RESPIRATION RATE: 16 BRPM | TEMPERATURE: 97.6 F | BODY MASS INDEX: 20.94 KG/M2 | HEART RATE: 61 BPM | OXYGEN SATURATION: 93 % | SYSTOLIC BLOOD PRESSURE: 126 MMHG

## 2024-07-17 DIAGNOSIS — I10 HTN (HYPERTENSION), BENIGN: ICD-10-CM

## 2024-07-17 DIAGNOSIS — Z86.79 HX OF REPAIR OF DISSECTING THORACIC AORTIC ANEURYSM, STANFORD TYPE A: ICD-10-CM

## 2024-07-17 DIAGNOSIS — I35.1 AORTIC VALVE INSUFFICIENCY, ETIOLOGY OF CARDIAC VALVE DISEASE UNSPECIFIED: ICD-10-CM

## 2024-07-17 DIAGNOSIS — Z98.890 HX OF REPAIR OF DISSECTING THORACIC AORTIC ANEURYSM, STANFORD TYPE A: ICD-10-CM

## 2024-07-17 DIAGNOSIS — M79.602 LEFT ARM PAIN: Primary | ICD-10-CM

## 2024-07-17 DIAGNOSIS — R42 DIZZINESS: Primary | ICD-10-CM

## 2024-07-17 PROCEDURE — 97035 APP MDLTY 1+ULTRASOUND EA 15: CPT | Mod: GO | Performed by: OCCUPATIONAL THERAPIST

## 2024-07-17 PROCEDURE — 97110 THERAPEUTIC EXERCISES: CPT | Mod: GO | Performed by: OCCUPATIONAL THERAPIST

## 2024-07-17 PROCEDURE — 97140 MANUAL THERAPY 1/> REGIONS: CPT | Mod: GO | Performed by: OCCUPATIONAL THERAPIST

## 2024-07-17 PROCEDURE — 99214 OFFICE O/P EST MOD 30 MIN: CPT | Performed by: PHYSICIAN ASSISTANT

## 2024-07-17 RX ORDER — LABETALOL 200 MG/1
200 TABLET, FILM COATED ORAL 2 TIMES DAILY
COMMUNITY
Start: 2024-07-17

## 2024-07-17 ASSESSMENT — PAIN SCALES - GENERAL: PAINLEVEL: NO PAIN (0)

## 2024-07-17 NOTE — TELEPHONE ENCOUNTER
Per chart review, patient scheduled for upcoming appt with provider today:    7/17/2024 1:30 PM (Arrive by 1:10 PM) Susan Pena PA-C [facility]     Closing this encounter.    Funmilayo WERNER  [facility]

## 2024-07-17 NOTE — PROGRESS NOTES
HPI: Karina is an 84 yo female here for dizziness x ? 4 months.  She is a retired  who is enjoying traveling  Most recently in [place] and notes she had sleep walking when on xanax, fell and broke ribs in April.  She is not taking trazodone    She is here for dizziness   This is worse first thing in the morning  She has been sitting longer at the bedside before getting up at night to use the bathroom  She admits to not drinking enough water so plans to try to increase water intake.  She denies episodes of vertigo of fainting    She does not monitor her BP at home but could      Past Medical History:   Diagnosis Date    Anemia     Aortic valve insufficiency, etiology of cardiac valve disease unspecified 2016    Best vitelliform macular dystrophy     CKD (chronic kidney disease) stage 3, GFR 30-59 ml/min (H) 2015    Former smoker     HTN (hypertension), benign     Hx of repair of dissecting thoracic aortic aneurysm, [place] type A     +fibromuscular dysplasia    Hyperlipidemia LDL goal < 130     Insomnia     ASH (obstructive sleep apnea)(mild AHI=14) 2015    Osteoporosis, postmenopausal      Past Surgical History:   Procedure Laterality Date    CARDIAC SURGERY      HC OPEN TX METATARSAL FRACTURE      S/p thoracic aortic aneurysm repair  10/21/12    VASCULAR SURGERY       Social History     Tobacco Use    Smoking status: Former     Current packs/day: 0.00     Types: Cigarettes     Quit date: 1974     Years since quittin.5    Smokeless tobacco: Never    Tobacco comments:     light smoker    Substance Use Topics    Alcohol use: Yes     Alcohol/week: 3.0 standard drinks of alcohol     Types: 3 Glasses of wine per week     Comment: I-2 drinks with dinner     Current Outpatient Medications   Medication Sig Dispense Refill    acetaminophen (TYLENOL) 500 MG tablet Take 2 tablets (1,000 mg) by mouth every 8 hours 120 tablet 0    amLODIPine (NORVASC) 5 MG tablet TAKE 1 TABLET(5 MG) BY  MOUTH DAILY 90 tablet 3    Ascorbic Acid (VITAMIN C PO) Take 500 mg by mouth daily      calcium carbonate (OS-ALEXI 500 MG [place]. CA) 500 MG tablet Take 500 mg by mouth daily      cholecalciferol (VITAMIN D3) 25 mcg (1000 units) capsule Take 1 capsule by mouth daily.      labetalol (NORMODYNE) 200 MG tablet Take 1 tablet (200 mg) by mouth 3 times daily 270 tablet 1    losartan (COZAAR) 50 MG tablet Take 1 tablet (50 mg) by mouth daily 90 tablet 3    multivitamin w/minerals (THERA-VIT-M) tablet Take 1 tablet by mouth daily      rosuvastatin (CRESTOR) 5 MG tablet Take 1 tablet (5 mg) by mouth daily 90 tablet 3    traZODone (DESYREL) 50 MG tablet TAKE 1 TABLET(50 MG) BY MOUTH EVERY EVENING AS NEEDED FOR SLEEP 90 tablet 1     Allergies   Allergen Reactions    Lisinopril      Cough      Simvastatin      myalgias    Xanax [Alprazolam]      Sleep walking     PHYSICAL EXAM:    /56 (BP Location: Left arm, Patient Position: Sitting, Cuff Size: Adult Regular)   Pulse 61   Temp 97.6  F (36.4  C)   Resp 16   Wt 50.3 kg (110 lb 12.8 oz)   SpO2 97%   BMI 20.94 kg/m      Patient appears non toxic  Lungs: CTA bilat  Heart: RRR with syst murmur  Extr: no edema    Assessment and Plan:     (R42) Dizziness  (primary encounter diagnosis)  Comment: Pt had labs end of May so not repeated today  Plan: decr labetalol from tid to bid and monitor BP readings at home. Pt admits to not drinking enough water and did order a water bottle to start trying to push flulids.    (I10) HTN (hypertension), benign  Comment:   Plan: labetalol (NORMODYNE) 200 MG tablet        Dose decr as above.    (Z98.890,  Z86.79) Hx of repair of dissecting thoracic aortic aneurysm, [facility] type A  Comment:   Plan: sees Dr. Jean-Baptiste in Nov but could ask for earlier appt with his NP given her dizziness.    (I35.1) Aortic valve insufficiency, etiology of cardiac valve disease unspecified  Comment:   Plan: echocardiogram recently completed and reviewed with  pt      Susan Pena PA-C

## 2024-07-24 ENCOUNTER — THERAPY VISIT (OUTPATIENT)
Dept: OCCUPATIONAL THERAPY | Facility: CLINIC | Age: 84
End: 2024-07-24
Payer: COMMERCIAL

## 2024-07-24 DIAGNOSIS — M79.602 LEFT ARM PAIN: Primary | ICD-10-CM

## 2024-07-24 PROCEDURE — 97110 THERAPEUTIC EXERCISES: CPT | Mod: GO | Performed by: OCCUPATIONAL THERAPIST

## 2024-07-24 PROCEDURE — 97140 MANUAL THERAPY 1/> REGIONS: CPT | Mod: GO | Performed by: OCCUPATIONAL THERAPIST

## 2024-07-31 ENCOUNTER — THERAPY VISIT (OUTPATIENT)
Dept: OCCUPATIONAL THERAPY | Facility: CLINIC | Age: 84
End: 2024-07-31
Payer: COMMERCIAL

## 2024-07-31 DIAGNOSIS — M79.602 LEFT ARM PAIN: Primary | ICD-10-CM

## 2024-07-31 PROCEDURE — 97140 MANUAL THERAPY 1/> REGIONS: CPT | Mod: GO | Performed by: OCCUPATIONAL THERAPIST

## 2024-07-31 PROCEDURE — 97110 THERAPEUTIC EXERCISES: CPT | Mod: GO | Performed by: OCCUPATIONAL THERAPIST

## 2024-08-14 ENCOUNTER — THERAPY VISIT (OUTPATIENT)
Dept: OCCUPATIONAL THERAPY | Facility: CLINIC | Age: 84
End: 2024-08-14
Payer: COMMERCIAL

## 2024-08-14 DIAGNOSIS — M79.602 LEFT ARM PAIN: Primary | ICD-10-CM

## 2024-08-14 PROCEDURE — 97110 THERAPEUTIC EXERCISES: CPT | Mod: GO | Performed by: OCCUPATIONAL THERAPIST

## 2024-08-21 ENCOUNTER — THERAPY VISIT (OUTPATIENT)
Dept: OCCUPATIONAL THERAPY | Facility: CLINIC | Age: 84
End: 2024-08-21
Payer: COMMERCIAL

## 2024-08-21 DIAGNOSIS — M79.602 LEFT ARM PAIN: Primary | ICD-10-CM

## 2024-08-21 PROCEDURE — 97110 THERAPEUTIC EXERCISES: CPT | Mod: GO | Performed by: OCCUPATIONAL THERAPIST

## 2024-08-21 PROCEDURE — 97035 APP MDLTY 1+ULTRASOUND EA 15: CPT | Mod: GO | Performed by: OCCUPATIONAL THERAPIST

## 2024-09-02 NOTE — ED NOTES
RN at bedside, pt watching TV and denies pain at this time. Pt has no needs at this time. EDT to place splint to L wrist   
PM  Condition at Disposition: Stable      PATIENT REFERRED TO:  Ignacio Babcock MD  [address] [id number]  [phone]    Schedule an appointment as soon as possible for a visit   To follow-up on this visit      DISCHARGE MEDICATIONS:  Discharge Medication List as of 9/2/2024  1:19 PM          Lucio Yoon DO  Emergency Medicine Physician     (Please note that portions of thisnote were completed with a voice recognition program.  Efforts were made to edit the dictations but occasionally words are mis-transcribed.)       Lucio Yoon DO  09/02/24 [id number]

## 2024-09-16 NOTE — TELEPHONE ENCOUNTER
Prescription approved per [facility] Refill Protocol.  Linda Landaverde RN    
labetalol (NORMODYNE) 200 MG tablet      Last Written Prescription Date: 12/16/2015  Last Fill Quantity: 135, # refills: 3    Last Office Visit with [facility], [facility] or [facility] prescribing provider:  4/13/2016   Future Office Visit:        BP Readings from Last 3 Encounters:   07/11/16 108/40   04/13/16 109/61   12/17/15 114/56       
No

## 2024-09-17 ENCOUNTER — THERAPY VISIT (OUTPATIENT)
Dept: OCCUPATIONAL THERAPY | Facility: CLINIC | Age: 84
End: 2024-09-17
Payer: COMMERCIAL

## 2024-09-17 DIAGNOSIS — M79.602 LEFT ARM PAIN: Primary | ICD-10-CM

## 2024-09-17 PROCEDURE — 97110 THERAPEUTIC EXERCISES: CPT | Mod: GO | Performed by: OCCUPATIONAL THERAPIST

## 2024-09-17 NOTE — PROGRESS NOTES
09/17/24 0500   Appointment Info   Treating Provider CATHY Talavera, GEOVANY   Total/Authorized Visits 10 POC)   Visits Used 9   Medical Diagnosis Left DRF   OT Tx Diagnosis Left DRF   Progress Note/Certification   Start Of Care Date 06/26/24   Onset of Illness/Injury or Date of Surgery 05/21/24   Therapy Frequency 1x/week   Predicted Duration 3 months   Certification date from 06/26/24   Certification date to 09/23/24   Progress Note Due Date 08/25/24   Progress Note Completed Date 09/17/24   OT Goal 1   Goal Identifier house chores   Goal Description  pan for cooking with no pain and minimal difficulty   Rationale In order to maximize safety and independence with ADL/IADLs   Target Date 09/23/24   Date Met 09/17/24   Subjective Report   Subjective Report  strengthening working well.   Objective Measures   Objective Measures Hand Obj Measures;Objective Measure 1;Objective Measure 2;Objective Measure 3;Objective Measure 4   Hand Objective Measures ROM;Strength   ROM Wrist ROM;Forearm ROM   Strength ;Lateral Pinch;3 Point Pinch   Wrist ROM    Extension 83   Flexion 60   Radial Deviation (RD) 22-   Ulnar Deviation (UD) 40-    (measured in pounds)    Average Strength R: 40#; L: 36#   Lateral Pinch (measured in pounds)   Lateral Pinch Average Strength R: 12#; L: 8#   3 Point Pinch (measured in pounds)   3 Point Pinch Average Strength R: 12#; L: 12#   OT Modalities   OT Modalities Ultrasound    Treatment Interventions (OT)   Interventions Manual Therapy;Therapeutic Procedure/Exercise;Neuromuscular Re-education   Neuromuscular Re-education   Neuro Re-ed 1 ktape circumference wrist   Therapeutic Procedure/Exercise   Therapeutic Procedure: strength, endurance, ROM, flexibillity minutes (27514) 15   Therapeutic Procedures Ther Proc 2;Ther Proc 4;Ther Proc 5   Ther Proc 2 advancing to 15# gripper and adding pinching   Ther Proc 4 30# gripper   PTRx Ther Proc 1 Wrist Active Range of Motion Extension   PTRx  Ther Proc 1 - Details No Notes   PTRx Ther Proc 2 Wrist Active Range of Motion Flexion   PTRx Ther Proc 2 - Details No Notes   PTRx Ther Proc 3 Wrist Active Range of Motion Radial Deviation   PTRx Ther Proc 3 - Details No Notes   PTRx Ther Proc 4 Wrist Active Range of Motion Ulnar Deviation   PTRx Ther Proc 4 - Details No Notes   PTRx Ther Proc 5 Forearm Active Range of Motion Supination   PTRx Ther Proc 5 - Details No Notes   PTRx Ther Proc 6 Wrist Passive Range of Motion Flexion   PTRx Ther Proc 6 - Details No Notes   PTRx Ther Proc 7 Hand Strengthening Gripping   PTRx Ther Proc 7 - Details No Notes   PTRx Ther Proc 8 Hand Strengthening 3 Point Pinch   PTRx Ther Proc 8 - Details No Notes   PTRx Ther Proc 9 Hand Strengthening Key Pinch   PTRx Ther Proc 9 - Details No Notes   PTRx Ther Proc 10 Wrist Strengthening Isotonic Extension   PTRx Ther Proc 10 - Details No Notes   PTRx Ther Proc 11 Wrist Strengthening Isotonic Flexion   PTRx Ther Proc 11 - Details No Notes   Therapeutic Activity   PTRx Ther Act 1 [place] Massage to Thumb   PTRx Ther Act 1 - Details No Notes   PTRx Ther Act 2 Education Sheet General   PTRx Ther Act 2 - Details No Notes   Total Session Time   Timed Code Treatment Minutes 15   Total Treatment Time (sum of timed and untimed services) 15       DISCHARGE  Reason for Discharge: Patient has met all goals.  No further expectation of progress.    Equipment Issued: none    Discharge Plan: Patient to continue home program.    Referring Provider:  Horace Webb

## 2024-11-07 ENCOUNTER — TRANSFERRED RECORDS (OUTPATIENT)
Dept: HEALTH INFORMATION MANAGEMENT | Facility: CLINIC | Age: 84
End: 2024-11-07
Payer: COMMERCIAL

## 2024-11-20 ENCOUNTER — OFFICE VISIT (OUTPATIENT)
Dept: CARDIOLOGY | Facility: CLINIC | Age: 84
End: 2024-11-20
Payer: COMMERCIAL

## 2024-11-20 VITALS
DIASTOLIC BLOOD PRESSURE: 59 MMHG | WEIGHT: 115.4 LBS | SYSTOLIC BLOOD PRESSURE: 116 MMHG | HEIGHT: 61 IN | OXYGEN SATURATION: 96 % | BODY MASS INDEX: 21.79 KG/M2 | HEART RATE: 62 BPM

## 2024-11-20 DIAGNOSIS — N18.2 CKD (CHRONIC KIDNEY DISEASE) STAGE 2, GFR 60-89 ML/MIN: ICD-10-CM

## 2024-11-20 DIAGNOSIS — E78.5 HYPERLIPIDEMIA LDL GOAL <100: Primary | ICD-10-CM

## 2024-11-20 DIAGNOSIS — I10 HTN (HYPERTENSION), BENIGN: ICD-10-CM

## 2024-11-20 RX ORDER — CARVEDILOL 12.5 MG/1
12.5 TABLET ORAL 2 TIMES DAILY WITH MEALS
Qty: 90 TABLET | Refills: 3 | Status: SHIPPED | OUTPATIENT
Start: 2024-11-20 | End: 2024-11-20

## 2024-11-20 RX ORDER — CARVEDILOL 6.25 MG/1
6.25 TABLET ORAL 2 TIMES DAILY WITH MEALS
Qty: 90 TABLET | Refills: 3 | Status: SHIPPED | OUTPATIENT
Start: 2024-11-20

## 2024-11-20 NOTE — PROGRESS NOTES
REASON FOR VISIT:  Followup for type A aortic dissection, status post repair.      PRIMARY CARE PROVIDER:  Susan Pena.      HISTORY OF PRESENT ILLNESS:  I again had the pleasure of seeing Karina Lopez at the [facility] in [place] this morning.  She is a very pleasant 82-year-old female with history of type A aortic dissection status post repair, hyperlipidemia and hypertension.      In 10/2012 the patient developed acute, tearing chest pain while flying to [place], [place].  At the time, she was working as a .  She was rushed to a local hospital, where she was diagnosed with acute type A aortic dissection.  She underwent emergent repair of the ascending aorta and resuspension of the aortic valve.  Postoperatively, she developed acute renal failure which resolved conservatively.  The aortic dissection extended down to the left external iliac artery.  Luckily, she did not have any blood flow compromised to her visceral organs or spinal cord.  All of the blood vessels to her visceral organs came off the true lumen.  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  Subsequent imaging demonstrated atrophic left kidney with mild perfusion abnormality.  She was seen by Nephrology, and they did not recommend an intervention with regard to the left renal artery stenosis.      Over the years, she has had multiple CTAs. CTA performed on November of 2020 showed patent aortic arch vessels as well as interval complete resolution of the false lumen within the thoracic aorta.  The left renal artery appeared still pinched by the false lumen.  Overall, the CTA findings remained stable.      She went to the emergency department earlier this year after mechanical fall and left flank pain which was determined to be due to rib fractures.  She underwent CTA of the which revealed stable repair of the ascending thoracic aorta.  The  infrarenal abdominal aorta dissection remained stable but the false lumen appeared near totally thrombosed.    She does not endorse any chest pain or shortness of breath.  She has been noticing dizzy spells over the past few months. She is also having cost related issues with Labetalol.     IMPRESSION, REPORT AND PLAN:   1.  History of type A aortic dissection, status post repair with resuspension of the aortic valve.   2.  Moderate aortic valve regurgitation.   3.  Hypertension.   4.  Hyperlipidemia.        She remains stable from cardiac point of view.  I reviewed her recent CT angiogram which showed stable thoracic aortic repair.  She does not endorse any cardiopulmonary symptoms at this point.     We will switch her from labetalol to carvedilol given cost issues.  She will keep an eye on her blood pressure and if the blood pressure is higher than the low-dose carvedilol, will uptitrate dose.    Will see her next year with repeat echocardiogram to follow-up on the moderate aortic regurgitation and with repeat CTA of the chest and abdomen.       I appreciate the opportunity to be part of this patient's care.          RAMAKRISHNA DIAZ MD        Today's clinic visit entailed:  40 minutes spent by me on the date of the encounter doing chart review, history and exam, documentation and further activities per the note  Provider  Link to [facility] Help Grid

## 2024-11-20 NOTE — LETTER
11/20/2024    Physician No Ref-Primary  No address on file    RE: Karina Lopez       Dear Colleague,     I had the pleasure of seeing Karina Lopez in the [facility].  REASON FOR VISIT:  Followup for type A aortic dissection, status post repair.      PRIMARY CARE PROVIDER:  Susan Pena.      HISTORY OF PRESENT ILLNESS:  I again had the pleasure of seeing Karina Lopez at the [facility] in [place] this morning.  She is a very pleasant 82-year-old female with history of type A aortic dissection status post repair, hyperlipidemia and hypertension.      In 10/2012 the patient developed acute, tearing chest pain while flying to [place], [place].  At the time, she was working as a .  She was rushed to a local hospital, where she was diagnosed with acute type A aortic dissection.  She underwent emergent repair of the ascending aorta and resuspension of the aortic valve.  Postoperatively, she developed acute renal failure which resolved conservatively.  The aortic dissection extended down to the left external iliac artery.  Luckily, she did not have any blood flow compromised to her visceral organs or spinal cord.  All of the blood vessels to her visceral organs came off the true lumen.  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  Subsequent imaging demonstrated atrophic left kidney with mild perfusion abnormality.  She was seen by Nephrology, and they did not recommend an intervention with regard to the left renal artery stenosis.      Over the years, she has had multiple CTAs. CTA performed on November of 2020 showed patent aortic arch vessels as well as interval complete resolution of the false lumen within the thoracic aorta.  The left renal artery appeared still pinched by the false lumen.  Overall, the CTA findings remained stable.      She went to the emergency department earlier this  year after mechanical fall and left flank pain which was determined to be due to rib fractures.  She underwent CTA of the which revealed stable repair of the ascending thoracic aorta.  The infrarenal abdominal aorta dissection remained stable but the false lumen appeared near totally thrombosed.    She does not endorse any chest pain or shortness of breath.  She has been noticing dizzy spells over the past few months. She is also having cost related issues with Labetalol.     IMPRESSION, REPORT AND PLAN:   1.  History of type A aortic dissection, status post repair with resuspension of the aortic valve.   2.  Moderate aortic valve regurgitation.   3.  Hypertension.   4.  Hyperlipidemia.        She remains stable from cardiac point of view.  I reviewed her recent CT angiogram which showed stable thoracic aortic repair.  She does not endorse any cardiopulmonary symptoms at this point.     We will switch her from labetalol to carvedilol given cost issues.  She will keep an eye on her blood pressure and if the blood pressure is higher than the low-dose carvedilol, will uptitrate dose.    Will see her next year with repeat echocardiogram to follow-up on the moderate aortic regurgitation and with repeat CTA of the chest and abdomen.       I appreciate the opportunity to be part of this patient's care.          RAMAKRISHNA JEAN-BAPTISTE MD        Today's clinic visit entailed:  40 minutes spent by me on the date of the encounter doing chart review, history and exam, documentation and further activities per the note  Provider  Link to [facility] Help Grid       Thank you for allowing me to participate in the care of your patient.      Sincerely,     Ramakrishna Jean-Baptiste MD, MD     [facility]  cc:   No referring provider defined for this encounter.

## 2025-03-02 ENCOUNTER — HEALTH MAINTENANCE LETTER (OUTPATIENT)
Age: 85
End: 2025-03-02

## 2025-03-25 ENCOUNTER — OFFICE VISIT (OUTPATIENT)
Dept: SLEEP MEDICINE | Facility: CLINIC | Age: 85
End: 2025-03-25
Payer: COMMERCIAL

## 2025-03-25 VITALS
BODY MASS INDEX: 21.26 KG/M2 | DIASTOLIC BLOOD PRESSURE: 68 MMHG | OXYGEN SATURATION: 92 % | HEART RATE: 66 BPM | SYSTOLIC BLOOD PRESSURE: 111 MMHG | WEIGHT: 112.6 LBS | HEIGHT: 61 IN

## 2025-03-25 DIAGNOSIS — G47.33 OSA (OBSTRUCTIVE SLEEP APNEA): Primary | ICD-10-CM

## 2025-03-25 PROCEDURE — 1126F AMNT PAIN NOTED NONE PRSNT: CPT | Performed by: INTERNAL MEDICINE

## 2025-03-25 PROCEDURE — 3078F DIAST BP <80 MM HG: CPT | Performed by: INTERNAL MEDICINE

## 2025-03-25 PROCEDURE — 3074F SYST BP LT 130 MM HG: CPT | Performed by: INTERNAL MEDICINE

## 2025-03-25 PROCEDURE — 99215 OFFICE O/P EST HI 40 MIN: CPT | Performed by: INTERNAL MEDICINE

## 2025-03-25 ASSESSMENT — SLEEP AND FATIGUE QUESTIONNAIRES
HOW LIKELY ARE YOU TO NOD OFF OR FALL ASLEEP WHEN YOU ARE A PASSENGER IN A CAR FOR AN HOUR WITHOUT A BREAK: SLIGHT CHANCE OF DOZING
HOW LIKELY ARE YOU TO NOD OFF OR FALL ASLEEP WHILE SITTING QUIETLY AFTER LUNCH WITHOUT ALCOHOL: MODERATE CHANCE OF DOZING
HOW LIKELY ARE YOU TO NOD OFF OR FALL ASLEEP IN A CAR, WHILE STOPPED FOR A FEW MINUTES IN TRAFFIC: WOULD NEVER DOZE
HOW LIKELY ARE YOU TO NOD OFF OR FALL ASLEEP WHILE WATCHING TV: MODERATE CHANCE OF DOZING
HOW LIKELY ARE YOU TO NOD OFF OR FALL ASLEEP WHILE SITTING AND TALKING TO SOMEONE: WOULD NEVER DOZE
HOW LIKELY ARE YOU TO NOD OFF OR FALL ASLEEP WHILE LYING DOWN TO REST IN THE AFTERNOON WHEN CIRCUMSTANCES PERMIT: HIGH CHANCE OF DOZING
HOW LIKELY ARE YOU TO NOD OFF OR FALL ASLEEP WHILE SITTING AND READING: MODERATE CHANCE OF DOZING
HOW LIKELY ARE YOU TO NOD OFF OR FALL ASLEEP WHILE SITTING INACTIVE IN A PUBLIC PLACE: WOULD NEVER DOZE

## 2025-03-25 NOTE — PATIENT INSTRUCTIONS
[facility]  Search engine: https://mms.aadsm.org/members/directory/search_bootstrap.php?org_id=ADSM&   Certified in Dental Sleep Medicine    Michele Nga  Degree: PAPA  [address]  [address]  [place] [address]  Professional Phone: [phone]  Website: [email or web address]    Ilia Fischer  Degree: PAPA  [facility]  [address]  [address]  [place] [address]  Professional Phone: [phone]: [phone]    [facility]  [address] [place] [address]  [address]  [place] [address]  Appointments: [phone]  Fax: [phone]    Sveta Brown  [facility]  [address]  [place] [address]  Professional Phone: [phone]  Website: [email or web address]      Tim Pinedo   [facility] [employer] [facility]   Degree: MD PAPA   [address]  [address]  [place] [address]  Appointments: [phone]    Felipe Joy  Degree: PAPA  [address]  [address]  [place] [address]  Professional Phone: [phone]  Fax: [phone]    Tyshawn Persaud  Degree: PAPA  [facility]  [address]  [address]  [place] [address]  Professional Phone: [phone]  Website: [email or web address]      Ade Lyon  [facility]-you should verify insurance coverage  [address]  [address]  [place] [address]  Professional Phone: [phone]  Website: [email or web address]      Salina Stallworth--DOES NOT ACCEPT INSURANCE  Degree: PAPA--you should verify insurance coverage  [facility]  [address]  [address]  [place] [address]  Professional Phone: [phone]    Anastasiia Del Rosario  Degree: PAPA, PhD  [facility]  [address] [place] [address]   Appointments: [phone]   Fax: [phone]     Alexander  [facility]  Degree: DDS  [address] [place] [address]  Professional Phone: [phone]  Fax: [phone]  Website: [email or web address]      Ricardo Salas  Degree: DDS  [facility]  [address] [place] [address]    Mariona Mulet Pradera  Degree: DDS, MS  [facility]  [address] [place] [address]  Professional Phone: [phone]      Mirtha Morales  Degree: DDS, MS  [facility]  [address]  [address]  [place] [address]  Professional Phone: [phone]    Yessica Kowalski  Degree: JAMESS  [facility]  [address]  [place] [address]  Appointments: [phone]    Mckinley Hayward  Degree: DDS  [facility]  [address] [place] [address]  Professional Phone: [phone]  Website: [email or web address]      Taras Rdz  Degree: DDS  [facility]  [address] [place] [address]  Professional Phone: [phone]  Fax: [phone]      Bubba Cuenca  Degree: DDS  [facility]  [address]  [address]  [place] [address]    Marcial Nathan   Degree: DDS   [facility]   [address]   [address]  [place] [address]  Phone [phone]  Website: [email or web address]    Sarah Jiménez   Degree: DDS   [address]  [address]  [place] [address]  Appointments: [phone]    Nat Pink   Degree: DDS   [address] [place] [address]   Appointments: [phone]    Horace Plaza   Degree: DDS   [address] [place] [address]   Appointments: [phone]    Harlan Alegria   Degree: DDS   [address] [place] [address]   Appointments: [phone]    Huseyin Yeung   Degree: PAPA [facility], [address] [address]   Appointments: [phone]    Rain Mack   Degree: PAPA  [address] [place] [address]  [place] [address]  Appointments: [phone]    Horace Hassanell   [address] [place] [address]  Appointments: [phone]    Susan Livingston   Degree: DDS   [facility] [address] [address]   Appointments: [phone]    Eh Brown   Degree: PAPA   [address] [place] [address]   Appointments: [phone]    [facility]    Degree: PAPA  [address]  [address]  [place] [address]  Appointments: [phone]    Micha Lisa   Degree: DDS   [facility]   [address]  [address]  [place] [address]  Appointment: [phone]    Melinda Girl   Degree: MS WILVER, FAAOP   [facility]  [address]   [address]  [place] [address]  Appointments: [phone]    Tim Hernandes   Degree: DDS   [address]   [place] [address]   Appointments: [phone]   Fax: [phone]        ACCEPT MEDICARE  Pola Kraft DDS  [address], [place] [address]  [phone]; [phone] (fax)  [employer]    Alexys Horner DDS, MS   [facility] [place] [address]   [address]   [place] [address]   Appointments: [phone]   Fax: [phone]     Frantz Fitzgerald   Degree: DMD, MSD   [facility]   [address]  [address]  [place] [address]  Appointments: [phone]  Fax: [phone]    Perla Enciso BDS, MS(CR). MS (OFP)  Diplomate American Board of Orofacial Pain  [facility]  [address],   [place] [address]  Appointment: [phone]  Fax: [phone]        ADDITIONAL PROVIDERS    Maik Liu DDS    [facility]  [address] [address]  Appointments: [phone]     [facility]  [address]  [place] [address]  Appointments: [phone]  Fax-  [phone]    Jailyn Chow   Degree: DDS   [facility]    [facility] [address] [address]  Appointments: [phone]

## 2025-03-25 NOTE — PROGRESS NOTES
"[facility]   Outpatient Sleep Medicine Follow-up Visit  March 25, 2025    Name: Karina Lopez MRN# [id number]   Age: 84 year old YOB: 1940     Date of Consultation: March 25, 2025  Consultation is requested by: No referring provider defined for this encounter.  Primary care provider: No Ref-Primary, Physician           Assessment and Plan:     Sleep Diagnoses:  Mild ASH with loud snoring  Intolerance to CPAP in the past. Open to alternatives and will refer for oral appliance as she appears to be ideal candidate. Referral to sleep dentistry placed. We will discuss repeat sleep study after fitting of oral appliance.       Orders Placed This Encounter   Procedures    Sleep Dental Referral       Summary Counseling:         History of Present Illness:   I had the pleasure of seeing Karina Lopez, who is an 84 year old female with HTN, depression, CKD, history of aortic dissection, macular dystrophy, insomnnia on trazodone and mild ASH previously treated with CPAP who presents to clinic today for follow up for same . Last seen by Amy Alonso PA-C in 2023.   Briefly review, reviewed prior PSG completed 4/7/2015 (126#, BMI 23.2) revealing AHI 14.1, RDI 14.1, supine AHI 70.6, non-supine AHI 2.9, REM (lateral) AHI 6.9, O2 april 82.2%, 3.5 minutes spent <89%. PLM index 39.9 with PLM arousal 0.8. Sleep architecture showed all sleep stages present with normal duration, sleep efficiency 90.7%, reduced sleep latency 2.2 min with trazodone. Following this positive study patient was started on CPAP but didn't use for very long - \"it was so burdensome and cumbersome and I never really got used to it\".   Presents to my clinic today interested in discussing alternatives to CPAP. States that she is also travelling to [facility] and she is concerned that her snoring is going to be socially disruptive. Wakes feeling unrefreshed often in AM. On trazodone for insomnia which is helpful when " taken. Wakes once or twice a night for the bathroom, no issues returning to sleep. Reports inadvertent dozing in afternoons if she sits down to rest, no planned daytime napping.      SLEEP-WAKE SCHEDULE:      Work/School Days: Patient goes to school/work: No            Usually gets into bed at varies between 10 and 1am  Takes patient about varies as i am taking medication for sleep  Has trouble falling asleep without medication often , denies issues falling asleep with trazodone   Wakes up in the middle of the night twice   Wakes up due to Snorting self awake;Use the bathroom  She has trouble falling back asleep 0 times a week.   Patient is usually up at varies between 8 and 9  Uses alarm: Yes     Weekends/Non-work Days/All Other Days:  Usually gets into bed at varies between 10 and 1   Takes patient without meds at least 30 to 45 minutes to fall asleep, again with trazodone not long  Patient is usually up at any time depending on schedule varies greatly  Uses alarm: Yes     Sleep Need  Patient gets 7 hours sleep on average             Patient thinks she needs about more than 7 sleep     Karina MEMO Lopez prefers to sleep in this position(s): Side;Head Elevated   Patient states they do the following activities in bed: Read;Use phone, computer, or tablet     Naps  Patient takes a purposeful nap varies i am very tired in afternoon and fall asleep sometimes on couch times a week and naps are usually about 30 minutes or so in duration  She feels better after a nap: Yes  She dozes off unintentionally when i allow myself almost every day days per week  Patient has had a driving accident or near-miss due to sleepiness/drowsiness: No        SLEEP DISRUPTIONS:     Breathing/Snoring  Patient snores:Yes  Other people complain about her snoring: Yes  Patient has been told she stops breathing in her sleep:No  She has issues with the following: Morning mouth dryness;Getting up to urinate more than once     Movement:  Patient gets  pain, discomfort, with an urge to move:  No  It happens when she is resting:  No  It happens more at night:  No  Patient has been told she kicks her legs at night:  No (PLMs seen on prior study)                Behaviors in Sleep:  Denies sleepwalking, sleep talking, sleep eating, bruxism, dream enactment, recurring nightmares, night terrors, sleep paralysis, hypnagogic/hypnopompic hallucinations, cataplexy     CAFFEINE AND OTHER SUBSTANCES:     Patient consumes caffeinated beverages per day:  1 cup of strong coffee  Last caffeine use is usually: morning  List of any prescribed or over the counter stimulants that patient takes:  None  List of any prescribed or over the counter sleep medication patient takes: Trazodone   Patient drinks alcohol to help them sleep: No  Patient drinks alcohol near bedtime: No     Family History:  Patient has a family member been diagnosed with a sleep disorder: Yes  my dad                       Most Recent SCALES:    EPWORTH SLEEPINESS SCALE WITHIN 1 YEAR WITHIN 10 DAYS   Sitting and reading (Patient-Rptd) 2 (Patient-Rptd) 2   Watching TV (Patient-Rptd) 2 (Patient-Rptd) 2   Sitting, inactive in a public place (theatre or [employer] (Patient-Rptd) 0  (Patient-Rptd) 0    As a passenger in a car (Patient-Rptd) 1 (Patient-Rptd) 1   Lying down to rest in the afternoon when circumstance permit (Patient-Rptd) 3 (Patient-Rptd) 3   Sitting and talking to someone (Patient-Rptd) 0 (Patient-Rptd) 0   Sitting quietly after lunch without alcohol (Patient-Rptd) 2 (Patient-Rptd) 2   In a car, while stopped for a few minutes in traffic (Patient-Rptd) 0 (Patient-Rptd) 0   TOTAL SCORE (Patient-Rptd) 10 (Patient-Rptd) 10   Normal < 11         0--none    1--mild    2--moderate  3--severe      INSOMNIA SEVERITY INDEX WITHIN 1 YEAR   Difficulty falling asleep (Patient-Rptd) 2   Difficult staying asleep (Patient-Rptd) 3   Problems waking up to early (Patient-Rptd) 3   How SATISFIED/DISSATISFIED are you with your  CURRENT sleep pattern? (Patient-Rptd) 3   How NOTICEABLE to others do you think your sleep pattern is in terms of your quality of life? (Patient-Rptd) 3   How WORRIED/DISTRESSED are you about your current sleep pattern? (Patient-Rptd) 3   To what extent do you consider your sleep problem to INTERFERE with your daily fuctioning(e.g. daytime fatigue, mood, ability to function at work/daily chores, concentration, mood,etc.) CURRENTLY? (Patient-Rptd) 3   INSOMNIA SEVERITY INDEX TOTAL SCORE (Patient-Rptd) 20    --absence of insomnia (0-7); sub-threshold insomnia (8-14); moderate insomnia (15-21); and severe insomnia (22-28)--          BHUMI Total Score: (Patient-Rptd) 20             Medications:     Current Outpatient Medications   Medication Sig Dispense Refill    amLODIPine (NORVASC) 5 MG tablet TAKE 1 TABLET(5 MG) BY MOUTH DAILY 90 tablet 3    Ascorbic Acid (VITAMIN C PO) Take 500 mg by mouth daily      calcium carbonate (OS-ALEXI 500 MG [place]. [place]) 500 MG tablet Take 500 mg by mouth daily      carvedilol (COREG) 6.25 MG tablet Take 1 tablet (6.25 mg) by mouth 2 times daily (with meals). 90 tablet 3    cholecalciferol (VITAMIN D3) 25 mcg (1000 units) capsule Take 1 capsule by mouth daily.      losartan (COZAAR) 50 MG tablet Take 1 tablet (50 mg) by mouth daily 90 tablet 3    multivitamin w/minerals (THERA-VIT-M) tablet Take 1 tablet by mouth daily      rosuvastatin (CRESTOR) 5 MG tablet Take 1 tablet (5 mg) by mouth daily 90 tablet 3    traZODone (DESYREL) 50 MG tablet TAKE 1 TABLET(50 MG) BY MOUTH EVERY EVENING AS NEEDED FOR SLEEP 90 tablet 0     No current facility-administered medications for this visit.        Allergies   Allergen Reactions    Lisinopril      Cough      Simvastatin      myalgias    Xanax [Alprazolam]      Sleep walking            Problem List:     Patient Active Problem List   Diagnosis    Best vitelliform macular dystrophy    Osteoporosis, postmenopausal    Advance care planning    Insomnia    HTN  "(hypertension), benign    Hx of repair of dissecting thoracic aortic aneurysm, [place] type A    Constipation    Cough due to ACE inhibitor    Anemia    Bruit    Dizziness    Vertigo    Snoring    ASH (obstructive sleep apnea) - mild AHI 10.6    CKD (chronic kidney disease) stage 3, GFR 30-59 ml/min (H)    Hyperlipidemia LDL goal <100    Aortic valve insufficiency, etiology of cardiac valve disease unspecified    Annual physical exam    Dissection of ascending aorta (H)    Visit for screening mammogram    Asymptomatic postmenopausal status    Single kidney    Medication side effects    Traumatic pneumothorax, initial encounter    Closed fracture of multiple ribs of left side, initial encounter            Past Medical History:     Does not need 02 supplement at night   Past Medical History:   Diagnosis Date    Anemia     Aortic valve insufficiency, etiology of cardiac valve disease unspecified 04/13/2016    Best vitelliform macular dystrophy     CKD (chronic kidney disease) stage 3, GFR 30-59 ml/min (H) 12/09/2015    Former smoker     HTN (hypertension), benign     Hx of repair of dissecting thoracic aortic aneurysm, [place] type A     +fibromuscular dysplasia    Hyperlipidemia LDL goal < 130     Insomnia     ASH (obstructive sleep apnea)(mild AHI=14) 04/12/2015    Osteoporosis, postmenopausal              Past Surgical History:    No h/o  upper airway surgery  Past Surgical History:   Procedure Laterality Date    CARDIAC SURGERY      HC OPEN TX METATARSAL FRACTURE      S/p thoracic aortic aneurysm repair  10/21/12    VASCULAR SURGERY                Physical Examination:   Objective:  Vitals: /68   Pulse 66   Ht 1.549 m (5' 0.98\")   Wt 51.1 kg (112 lb 9.6 oz)   SpO2 92%   BMI 21.29 kg/m    BMI= Body mass index is 21.29 kg/m .  Exam:  Constitutional: healthy, alert, and no distress  Head: Normocephalic.   ENT: ENT exam normal, no neck nodes or sinus tenderness  Cardiovascular: negative  Respiratory: " negative  Gastrointestinal: Deferred  : Deferred  Musculoskeletal: extremities normal- no gross deformities noted, gait normal, and normal muscle tone  Skin: no suspicious lesions or rashes  Neurologic: Gait normal. Reflexes normal and symmetric. Sensation grossly WNL.  Psychiatric: mentation appears normal and affect normal/bright      Copy to: No Ref-Primary, Physician      Jeanette Ryder MD 3/25/2025         Total time spent reviewing medical records including previous testing and interpretation as well as direct patient contact and documentation on this date: 40 minutes

## 2025-03-26 DIAGNOSIS — I10 HTN (HYPERTENSION), BENIGN: ICD-10-CM

## 2025-03-27 RX ORDER — LOSARTAN POTASSIUM 50 MG/1
50 TABLET ORAL DAILY
Qty: 90 TABLET | Refills: 1 | Status: SHIPPED | OUTPATIENT
Start: 2025-03-27

## 2025-03-29 ENCOUNTER — HEALTH MAINTENANCE LETTER (OUTPATIENT)
Age: 85
End: 2025-03-29

## 2025-04-12 DIAGNOSIS — F51.01 PRIMARY INSOMNIA: ICD-10-CM

## 2025-04-12 DIAGNOSIS — E78.5 HYPERLIPIDEMIA LDL GOAL <100: ICD-10-CM

## 2025-04-14 RX ORDER — TRAZODONE HYDROCHLORIDE 50 MG/1
TABLET ORAL
Qty: 90 TABLET | Refills: 0 | Status: SHIPPED | OUTPATIENT
Start: 2025-04-14

## 2025-04-14 RX ORDER — ROSUVASTATIN CALCIUM 5 MG/1
5 TABLET, COATED ORAL DAILY
Qty: 90 TABLET | Refills: 0 | Status: SHIPPED | OUTPATIENT
Start: 2025-04-14

## 2025-07-12 DIAGNOSIS — E78.5 HYPERLIPIDEMIA LDL GOAL <100: ICD-10-CM

## 2025-07-14 RX ORDER — ROSUVASTATIN CALCIUM 5 MG/1
5 TABLET, COATED ORAL DAILY
Qty: 90 TABLET | Refills: 0 | Status: SHIPPED | OUTPATIENT
Start: 2025-07-14

## 2025-07-16 NOTE — TELEPHONE ENCOUNTER
Spoke to Karina and she said that she wanted to review her med list to see who prescribed this medication to her and why - then she will call  us back to schedule the appt.

## 2025-08-21 ENCOUNTER — TRANSFERRED RECORDS (OUTPATIENT)
Dept: HEALTH INFORMATION MANAGEMENT | Facility: CLINIC | Age: 85
End: 2025-08-21
Payer: COMMERCIAL

## 2025-08-22 ENCOUNTER — TELEPHONE (OUTPATIENT)
Dept: SLEEP MEDICINE | Facility: CLINIC | Age: 85
End: 2025-08-22
Payer: COMMERCIAL